# Patient Record
Sex: MALE | Race: WHITE | NOT HISPANIC OR LATINO | Employment: OTHER | URBAN - METROPOLITAN AREA
[De-identification: names, ages, dates, MRNs, and addresses within clinical notes are randomized per-mention and may not be internally consistent; named-entity substitution may affect disease eponyms.]

---

## 2018-02-05 ENCOUNTER — OFFICE VISIT CONVERTED (OUTPATIENT)
Dept: FAMILY MEDICINE CLINIC | Facility: CLINIC | Age: 58
End: 2018-02-05
Attending: PHYSICIAN ASSISTANT

## 2018-08-31 ENCOUNTER — OFFICE VISIT CONVERTED (OUTPATIENT)
Dept: FAMILY MEDICINE CLINIC | Facility: CLINIC | Age: 58
End: 2018-08-31
Attending: PHYSICIAN ASSISTANT

## 2018-08-31 ENCOUNTER — CONVERSION ENCOUNTER (OUTPATIENT)
Dept: FAMILY MEDICINE CLINIC | Facility: CLINIC | Age: 58
End: 2018-08-31

## 2019-01-08 ENCOUNTER — HOSPITAL ENCOUNTER (OUTPATIENT)
Dept: OTHER | Facility: HOSPITAL | Age: 59
Discharge: HOME | End: 2019-01-08
Attending: NURSE PRACTITIONER

## 2019-01-08 LAB
HBA1C MFR BLD: 16.2 G/DL (ref 14–18)
HCT VFR BLD AUTO: 47.8 % (ref 42–52)

## 2019-01-29 ENCOUNTER — HOSPITAL ENCOUNTER (OUTPATIENT)
Dept: GENERAL RADIOLOGY | Facility: HOSPITAL | Age: 59
Discharge: HOME | End: 2019-01-29
Attending: INTERNAL MEDICINE

## 2019-02-12 ENCOUNTER — HOSPITAL ENCOUNTER (OUTPATIENT)
Dept: ONCOLOGY | Facility: HOSPITAL | Age: 59
Discharge: HOME | End: 2019-02-12
Attending: NURSE PRACTITIONER

## 2019-02-12 LAB
BASOPHILS # BLD AUTO: 0.04 10*3/UL (ref 0–0.2)
BASOPHILS # BLD: 1 % (ref 0–3)
BASOPHILS NFR BLD AUTO: 0.89 % (ref 0–3)
CONV ABS BANDS: 0 % (ref 1–5)
CONV SEGMENTED NEUTROPHILS: 62 % (ref 45–70)
EOSINOPHIL # BLD AUTO: 0.49 10*3/UL (ref 0–0.7)
EOSINOPHIL # BLD AUTO: 10.2 % (ref 0–7)
EOSINOPHIL NFR BLD AUTO: 5 % (ref 0–7)
ERYTHROCYTE [DISTWIDTH] IN BLOOD BY AUTOMATED COUNT: 11.7 % (ref 11.5–14.5)
HBA1C MFR BLD: 16.9 G/DL (ref 14–18)
HCT VFR BLD AUTO: 47.4 % (ref 42–52)
LYMPHOCYTES # BLD AUTO: 1.06 10*3/UL (ref 1–5)
MCH RBC QN AUTO: 32.1 PG (ref 27–31)
MCHC RBC AUTO-ENTMCNC: 35.7 G/DL (ref 33–37)
MCV RBC AUTO: 90 FL (ref 80–96)
MONOCYTES # BLD AUTO: 0.59 10*3/UL (ref 0.2–1.2)
MONOCYTES NFR BLD AUTO: 12.4 % (ref 3–10)
MONOCYTES NFR BLD MANUAL: 11 % (ref 3–10)
NEUTROPHILS # BLD AUTO: 2.58 10*3/UL (ref 2–8)
NEUTROPHILS NFR BLD AUTO: 54.2 % (ref 30–85)
NRBC BLD AUTO-RTO: 0 % (ref 0–0.01)
NUC CELL # PRT MANUAL: 0 /100{WBCS}
PLAT MORPH BLD: NORMAL
PLATELET # BLD AUTO: 226 10*3/UL (ref 130–400)
PMV BLD AUTO: 7.6 FL (ref 7.4–10.4)
RBC # BLD AUTO: 5.27 10*6/UL (ref 4.7–6.1)
SMALL PLATELETS BLD QL SMEAR: ADEQUATE
VARIANT LYMPHS NFR BLD MANUAL: 21 % (ref 20–45)
VARIANT LYMPHS NFR BLD MANUAL: 22.3 % (ref 20–45)
WBC # BLD AUTO: 4.76 10*3/UL (ref 4.8–10.8)

## 2019-02-19 ENCOUNTER — HOSPITAL ENCOUNTER (OUTPATIENT)
Dept: OTHER | Facility: HOSPITAL | Age: 59
Discharge: HOME | End: 2019-02-19
Attending: NURSE PRACTITIONER

## 2019-02-19 LAB
HBA1C MFR BLD: 17.1 G/DL (ref 14–18)
HCT VFR BLD AUTO: 49.2 % (ref 42–52)

## 2019-03-01 ENCOUNTER — OFFICE VISIT CONVERTED (OUTPATIENT)
Dept: FAMILY MEDICINE CLINIC | Facility: CLINIC | Age: 59
End: 2019-03-01
Attending: PHYSICIAN ASSISTANT

## 2019-03-19 ENCOUNTER — HOSPITAL ENCOUNTER (OUTPATIENT)
Dept: OTHER | Facility: HOSPITAL | Age: 59
Discharge: HOME | End: 2019-03-19
Attending: NURSE PRACTITIONER

## 2019-03-19 LAB
ALBUMIN SERPL-MCNC: 4.2 G/DL (ref 3.5–5)
ALBUMIN/GLOB SERPL: 1.4 {RATIO} (ref 1.4–2.6)
ALP SERPL-CCNC: 80 U/L (ref 56–119)
ALT SERPL-CCNC: 19 U/L (ref 10–40)
ANION GAP SERPL CALC-SCNC: 16 MMOL/L (ref 8–19)
APPEARANCE UR: CLEAR
AST SERPL-CCNC: 16 U/L (ref 15–50)
BASOPHILS # BLD AUTO: 0.09 10*3/UL (ref 0–0.2)
BASOPHILS NFR BLD AUTO: 1.4 % (ref 0–3)
BILIRUB SERPL-MCNC: 0.4 MG/DL (ref 0.2–1.3)
BILIRUB UR QL: NEGATIVE
BUN SERPL-MCNC: 8 MG/DL (ref 5–25)
BUN/CREAT SERPL: 9 {RATIO} (ref 6–20)
CALCIUM SERPL-MCNC: 8.9 MG/DL (ref 8.7–10.4)
CHLORIDE SERPL-SCNC: 101 MMOL/L (ref 99–111)
CHOLEST SERPL-MCNC: 165 MG/DL (ref 107–200)
CHOLEST/HDLC SERPL: 4 {RATIO} (ref 3–6)
COLOR UR: YELLOW
CONV ABS IMM GRAN: 0.02 10*3/UL (ref 0–0.2)
CONV CO2: 27 MMOL/L (ref 22–32)
CONV COLLECTION SOURCE (UA): NORMAL
CONV IMMATURE GRAN: 0.3 % (ref 0–1.8)
CONV TOTAL PROTEIN: 7.1 G/DL (ref 6.3–8.2)
CONV UROBILINOGEN IN URINE BY AUTOMATED TEST STRIP: 1 {EHRLICHU}/DL (ref 0.1–1)
CREAT UR-MCNC: 0.94 MG/DL (ref 0.7–1.2)
DEPRECATED RDW RBC AUTO: 41.9 FL (ref 35.1–43.9)
EOSINOPHIL # BLD AUTO: 0.42 10*3/UL (ref 0–0.7)
EOSINOPHIL # BLD AUTO: 6.7 % (ref 0–7)
ERYTHROCYTE [DISTWIDTH] IN BLOOD BY AUTOMATED COUNT: 12.6 % (ref 11.6–14.4)
EST. AVERAGE GLUCOSE BLD GHB EST-MCNC: 128 MG/DL
GFR SERPLBLD BASED ON 1.73 SQ M-ARVRAT: >60 ML/MIN/{1.73_M2}
GLOBULIN UR ELPH-MCNC: 2.9 G/DL (ref 2–3.5)
GLUCOSE SERPL-MCNC: 112 MG/DL (ref 70–99)
GLUCOSE UR QL: NEGATIVE MG/DL
HBA1C MFR BLD: 15.7 G/DL (ref 14–18)
HBA1C MFR BLD: 16.1 G/DL (ref 14–18)
HBA1C MFR BLD: 6.1 % (ref 3.5–5.7)
HCT VFR BLD AUTO: 46.3 % (ref 42–52)
HCT VFR BLD AUTO: 46.7 % (ref 42–52)
HDLC SERPL-MCNC: 41 MG/DL (ref 40–60)
HGB UR QL STRIP: NEGATIVE
KETONES UR QL STRIP: NEGATIVE MG/DL
LDLC SERPL CALC-MCNC: 82 MG/DL (ref 70–100)
LEUKOCYTE ESTERASE UR QL STRIP: NEGATIVE
LYMPHOCYTES # BLD AUTO: 1.33 10*3/UL (ref 1–5)
MCH RBC QN AUTO: 31.1 PG (ref 27–31)
MCHC RBC AUTO-ENTMCNC: 34.5 G/DL (ref 33–37)
MCV RBC AUTO: 90.2 FL (ref 80–96)
MONOCYTES # BLD AUTO: 0.69 10*3/UL (ref 0.2–1.2)
MONOCYTES NFR BLD AUTO: 11 % (ref 3–10)
NEUTROPHILS # BLD AUTO: 3.75 10*3/UL (ref 2–8)
NEUTROPHILS NFR BLD AUTO: 59.5 % (ref 30–85)
NITRITE UR QL STRIP: NEGATIVE
NRBC CBCN: 0 % (ref 0–0.7)
OSMOLALITY SERPL CALC.SUM OF ELEC: 289 MOSM/KG (ref 273–304)
PH UR STRIP.AUTO: 8 [PH] (ref 5–8)
PLATELET # BLD AUTO: 254 10*3/UL (ref 130–400)
PMV BLD AUTO: 10.1 FL (ref 9.4–12.4)
POTASSIUM SERPL-SCNC: 4 MMOL/L (ref 3.5–5.3)
PROT UR QL: NEGATIVE MG/DL
RBC # BLD AUTO: 5.18 10*6/UL (ref 4.7–6.1)
SODIUM SERPL-SCNC: 140 MMOL/L (ref 135–147)
SP GR UR: 1.02 (ref 1–1.03)
T4 FREE SERPL-MCNC: 1.2 NG/DL (ref 0.9–1.8)
TRIGL SERPL-MCNC: 208 MG/DL (ref 40–150)
TSH SERPL-ACNC: 2.34 M[IU]/L (ref 0.27–4.2)
VARIANT LYMPHS NFR BLD MANUAL: 21.1 % (ref 20–45)
VLDLC SERPL-MCNC: 42 MG/DL (ref 5–37)
WBC # BLD AUTO: 6.3 10*3/UL (ref 4.8–10.8)

## 2019-03-20 LAB — HCV AB S/CO SERPL IA: <0.1 S/CO RATIO (ref 0–0.9)

## 2019-04-23 ENCOUNTER — HOSPITAL ENCOUNTER (OUTPATIENT)
Dept: OTHER | Facility: HOSPITAL | Age: 59
Discharge: HOME | End: 2019-04-23
Attending: NURSE PRACTITIONER

## 2019-04-23 LAB
CONV THERAPEUTIC PHLEBOTOMY: 510 ML
HBA1C MFR BLD: 16.9 G/DL (ref 14–18)
HCT VFR BLD AUTO: 51.1 % (ref 42–52)

## 2019-05-07 ENCOUNTER — HOSPITAL ENCOUNTER (OUTPATIENT)
Dept: ONCOLOGY | Facility: HOSPITAL | Age: 59
Discharge: HOME | End: 2019-05-07
Attending: NURSE PRACTITIONER

## 2019-07-17 ENCOUNTER — HOSPITAL ENCOUNTER (OUTPATIENT)
Dept: INFUSION THERAPY | Facility: HOSPITAL | Age: 59
Discharge: HOME | End: 2019-07-17
Attending: NURSE PRACTITIONER

## 2019-07-17 LAB
ALBUMIN SERPL-MCNC: 4.6 G/DL (ref 3.5–5)
ALBUMIN/GLOB SERPL: 1.6 {RATIO} (ref 1.4–2.6)
ALP SERPL-CCNC: 71 U/L (ref 56–155)
ALT SERPL-CCNC: 26 U/L (ref 10–40)
ANION GAP SERPL CALC-SCNC: 19 MMOL/L (ref 8–19)
AST SERPL-CCNC: 22 U/L (ref 15–50)
BASOPHILS # BLD AUTO: 0.07 10*3/UL (ref 0–0.2)
BASOPHILS NFR BLD AUTO: 1.6 % (ref 0–3)
BILIRUB SERPL-MCNC: 0.45 MG/DL (ref 0.2–1.3)
BUN SERPL-MCNC: 10 MG/DL (ref 5–25)
BUN/CREAT SERPL: 11 {RATIO} (ref 6–20)
CALCIUM SERPL-MCNC: 9.1 MG/DL (ref 8.7–10.4)
CHLORIDE SERPL-SCNC: 103 MMOL/L (ref 99–111)
CONV ABS IMM GRAN: 0.01 10*3/UL (ref 0–0.2)
CONV CO2: 22 MMOL/L (ref 22–32)
CONV IMMATURE GRAN: 0.2 % (ref 0–1.8)
CONV TOTAL PROTEIN: 7.4 G/DL (ref 6.3–8.2)
CREAT UR-MCNC: 0.9 MG/DL (ref 0.7–1.2)
DEPRECATED RDW RBC AUTO: 47.5 FL (ref 35.1–43.9)
EOSINOPHIL # BLD AUTO: 0.33 10*3/UL (ref 0–0.7)
EOSINOPHIL # BLD AUTO: 7.6 % (ref 0–7)
ERYTHROCYTE [DISTWIDTH] IN BLOOD BY AUTOMATED COUNT: 14.1 % (ref 11.6–14.4)
GFR SERPLBLD BASED ON 1.73 SQ M-ARVRAT: >60 ML/MIN/{1.73_M2}
GLOBULIN UR ELPH-MCNC: 2.8 G/DL (ref 2–3.5)
GLUCOSE SERPL-MCNC: 116 MG/DL (ref 70–99)
HBA1C MFR BLD: 18.3 G/DL (ref 14–18)
HCT VFR BLD AUTO: 53.8 % (ref 42–52)
LDH SERPL-CCNC: 197 U/L (ref 120–240)
LYMPHOCYTES # BLD AUTO: 1 10*3/UL (ref 1–5)
MCH RBC QN AUTO: 31.3 PG (ref 27–31)
MCHC RBC AUTO-ENTMCNC: 34 G/DL (ref 33–37)
MCV RBC AUTO: 92.1 FL (ref 80–96)
MONOCYTES # BLD AUTO: 0.55 10*3/UL (ref 0.2–1.2)
MONOCYTES NFR BLD AUTO: 12.7 % (ref 3–10)
NEUTROPHILS # BLD AUTO: 2.36 10*3/UL (ref 2–8)
NEUTROPHILS NFR BLD AUTO: 54.8 % (ref 30–85)
NRBC CBCN: 0 % (ref 0–0.7)
OSMOLALITY SERPL CALC.SUM OF ELEC: 290 MOSM/KG (ref 273–304)
PLATELET # BLD AUTO: 212 10*3/UL (ref 130–400)
PMV BLD AUTO: 11 FL (ref 9.4–12.4)
POTASSIUM SERPL-SCNC: 4.1 MMOL/L (ref 3.5–5.3)
RBC # BLD AUTO: 5.84 10*6/UL (ref 4.7–6.1)
SODIUM SERPL-SCNC: 140 MMOL/L (ref 135–147)
VARIANT LYMPHS NFR BLD MANUAL: 23.1 % (ref 20–45)
WBC # BLD AUTO: 4.32 10*3/UL (ref 4.8–10.8)

## 2019-09-03 ENCOUNTER — OFFICE VISIT CONVERTED (OUTPATIENT)
Dept: FAMILY MEDICINE CLINIC | Facility: CLINIC | Age: 59
End: 2019-09-03
Attending: PHYSICIAN ASSISTANT

## 2019-09-24 ENCOUNTER — HOSPITAL ENCOUNTER (OUTPATIENT)
Dept: INFUSION THERAPY | Facility: HOSPITAL | Age: 59
Discharge: HOME | End: 2019-09-24
Attending: PHYSICIAN ASSISTANT

## 2019-09-24 ENCOUNTER — HOSPITAL ENCOUNTER (OUTPATIENT)
Dept: INFUSION THERAPY | Facility: HOSPITAL | Age: 59
Discharge: HOME | End: 2019-09-24
Attending: NURSE PRACTITIONER

## 2019-09-24 LAB
CHOLEST SERPL-MCNC: 181 MG/DL (ref 107–200)
CHOLEST/HDLC SERPL: 5 {RATIO} (ref 3–6)
HCT VFR BLD AUTO: 51.6 % (ref 42–52)
HDLC SERPL-MCNC: 36 MG/DL (ref 40–60)
HGB BLD-MCNC: 17.7 G/DL (ref 14–18)
LDLC SERPL CALC-MCNC: 105 MG/DL (ref 70–100)
PSA SERPL-MCNC: 0.76 NG/ML (ref 0–4)
T4 FREE SERPL-MCNC: 1.3 NG/DL (ref 0.9–1.8)
TRIGL SERPL-MCNC: 201 MG/DL (ref 40–150)
TSH SERPL-ACNC: 1.83 M[IU]/L (ref 0.27–4.2)
VLDLC SERPL-MCNC: 40 MG/DL (ref 5–37)

## 2019-11-05 ENCOUNTER — HOSPITAL ENCOUNTER (OUTPATIENT)
Dept: ONCOLOGY | Facility: HOSPITAL | Age: 59
Discharge: HOME | End: 2019-11-05

## 2019-11-18 ENCOUNTER — HOSPITAL ENCOUNTER (OUTPATIENT)
Dept: INFUSION THERAPY | Facility: HOSPITAL | Age: 59
Discharge: HOME | End: 2019-11-18
Attending: NURSE PRACTITIONER

## 2019-11-18 LAB
HCT VFR BLD AUTO: 49.7 % (ref 42–52)
HGB BLD-MCNC: 16.6 G/DL (ref 14–18)

## 2019-12-18 ENCOUNTER — HOSPITAL ENCOUNTER (OUTPATIENT)
Dept: INFUSION THERAPY | Facility: HOSPITAL | Age: 59
Discharge: HOME | End: 2019-12-18
Attending: NURSE PRACTITIONER

## 2019-12-18 LAB
HCT VFR BLD AUTO: 46.2 % (ref 42–52)
HGB BLD-MCNC: 15.9 G/DL (ref 14–18)

## 2020-01-15 ENCOUNTER — OFFICE VISIT CONVERTED (OUTPATIENT)
Dept: ONCOLOGY | Facility: HOSPITAL | Age: 60
End: 2020-01-15
Attending: INTERNAL MEDICINE

## 2020-01-15 ENCOUNTER — HOSPITAL ENCOUNTER (OUTPATIENT)
Dept: ONCOLOGY | Facility: HOSPITAL | Age: 60
Discharge: HOME | End: 2020-01-15
Attending: INTERNAL MEDICINE

## 2020-01-15 LAB
BASOPHILS # BLD AUTO: 0.07 10*3/UL (ref 0–0.2)
BASOPHILS NFR BLD AUTO: 1.4 % (ref 0–3)
CONV ABS IMM GRAN: 0.02 10*3/UL (ref 0–0.54)
CONV EOSINOPHILS PERCENT BY MANUAL COUNT: 7.6 % (ref 0–7)
CONV IMMATURE GRAN: 0.4 % (ref 0–0.4)
EOSINOPHIL # BLD MANUAL: 0.38 10*3/UL (ref 0–0.7)
ERYTHROCYTE [DISTWIDTH] IN BLOOD BY AUTOMATED COUNT: 13.2 % (ref 11.5–14.5)
ERYTHROCYTE [DISTWIDTH] IN BLOOD BY AUTOMATED COUNT: 44.1 FL
HBA1C MFR BLD: 16.5 G/DL (ref 14–18)
HCT VFR BLD AUTO: 47.9 % (ref 42–52)
LYMPHOCYTES # BLD AUTO: 1.08 10*3/UL (ref 1–5)
LYMPHOCYTES NFR BLD AUTO: 21.7 % (ref 20–45)
MCH RBC QN AUTO: 31 PG (ref 27–31)
MCHC RBC AUTO-ENTMCNC: 34.4 G/DL (ref 33–37)
MCV RBC AUTO: 90 FL (ref 80–96)
MONOCYTES # BLD AUTO: 0.65 10*3/UL (ref 0.2–1.2)
MONOCYTES NFR BLD MANUAL: 13.1 % (ref 3–10)
NEUTROPHILS # BLD AUTO: 2.78 10*3/UL (ref 2–8)
NEUTROPHILS NFR BLD MANUAL: 55.8 % (ref 30–85)
PLATELET # BLD AUTO: 220 10*3/UL (ref 130–400)
PMV BLD AUTO: 10.3 FL (ref 7.4–10.4)
RBC MORPH BLD: 5.32 10*6/UL (ref 4.7–6.1)
WBC # BLD AUTO: 4.98 10*3/UL (ref 4.8–10.8)

## 2020-01-29 ENCOUNTER — HOSPITAL ENCOUNTER (OUTPATIENT)
Dept: CT IMAGING | Facility: HOSPITAL | Age: 60
Discharge: HOME | End: 2020-01-29
Attending: INTERNAL MEDICINE

## 2020-02-05 ENCOUNTER — OFFICE VISIT CONVERTED (OUTPATIENT)
Dept: ONCOLOGY | Facility: HOSPITAL | Age: 60
End: 2020-02-05
Attending: INTERNAL MEDICINE

## 2020-02-10 ENCOUNTER — HOSPITAL ENCOUNTER (OUTPATIENT)
Dept: INFUSION THERAPY | Facility: HOSPITAL | Age: 60
Discharge: HOME | End: 2020-02-10
Attending: INTERNAL MEDICINE

## 2020-02-10 LAB
HCT VFR BLD AUTO: 48.6 % (ref 42–52)
HGB BLD-MCNC: 16.8 G/DL (ref 14–18)

## 2020-03-03 ENCOUNTER — CONVERSION ENCOUNTER (OUTPATIENT)
Dept: FAMILY MEDICINE CLINIC | Facility: CLINIC | Age: 60
End: 2020-03-03

## 2020-03-03 ENCOUNTER — HOSPITAL ENCOUNTER (OUTPATIENT)
Dept: LAB | Facility: HOSPITAL | Age: 60
Discharge: HOME | End: 2020-03-03
Attending: PHYSICIAN ASSISTANT

## 2020-03-03 ENCOUNTER — OFFICE VISIT CONVERTED (OUTPATIENT)
Dept: FAMILY MEDICINE CLINIC | Facility: CLINIC | Age: 60
End: 2020-03-03
Attending: PHYSICIAN ASSISTANT

## 2020-03-03 LAB
25(OH)D3 SERPL-MCNC: 14.5 NG/ML (ref 30–100)
APPEARANCE UR: CLEAR
BASOPHILS # BLD AUTO: 0.09 10*3/UL (ref 0–0.2)
BASOPHILS NFR BLD AUTO: 1.7 % (ref 0–3)
BILIRUB UR QL: NEGATIVE
COLOR UR: YELLOW
CONV ABS IMM GRAN: 0.01 10*3/UL (ref 0–0.2)
CONV COLLECTION SOURCE (UA): NORMAL
CONV IMMATURE GRAN: 0.2 % (ref 0–1.8)
CONV UROBILINOGEN IN URINE BY AUTOMATED TEST STRIP: 1 {EHRLICHU}/DL (ref 0.1–1)
DEPRECATED RDW RBC AUTO: 46.1 FL (ref 35.1–43.9)
EOSINOPHIL # BLD AUTO: 0.42 10*3/UL (ref 0–0.7)
EOSINOPHIL # BLD AUTO: 8 % (ref 0–7)
ERYTHROCYTE [DISTWIDTH] IN BLOOD BY AUTOMATED COUNT: 13.6 % (ref 11.6–14.4)
GLUCOSE UR QL: NEGATIVE MG/DL
HCT VFR BLD AUTO: 50.7 % (ref 42–52)
HGB BLD-MCNC: 17.2 G/DL (ref 14–18)
HGB UR QL STRIP: NEGATIVE
KETONES UR QL STRIP: NEGATIVE MG/DL
LEUKOCYTE ESTERASE UR QL STRIP: NEGATIVE
LYMPHOCYTES # BLD AUTO: 1.27 10*3/UL (ref 1–5)
LYMPHOCYTES NFR BLD AUTO: 24.2 % (ref 20–45)
MCH RBC QN AUTO: 30.9 PG (ref 27–31)
MCHC RBC AUTO-ENTMCNC: 33.9 G/DL (ref 33–37)
MCV RBC AUTO: 91.2 FL (ref 80–96)
MONOCYTES # BLD AUTO: 0.66 10*3/UL (ref 0.2–1.2)
MONOCYTES NFR BLD AUTO: 12.6 % (ref 3–10)
NEUTROPHILS # BLD AUTO: 2.8 10*3/UL (ref 2–8)
NEUTROPHILS NFR BLD AUTO: 53.3 % (ref 30–85)
NITRITE UR QL STRIP: NEGATIVE
NRBC CBCN: 0 % (ref 0–0.7)
PH UR STRIP.AUTO: 8 [PH] (ref 5–8)
PLATELET # BLD AUTO: 248 10*3/UL (ref 130–400)
PMV BLD AUTO: 11.2 FL (ref 9.4–12.4)
PROT UR QL: NEGATIVE MG/DL
RBC # BLD AUTO: 5.56 10*6/UL (ref 4.7–6.1)
SP GR UR: 1.02 (ref 1–1.03)
T4 FREE SERPL-MCNC: 1 NG/DL (ref 0.9–1.8)
TSH SERPL-ACNC: 2.81 M[IU]/L (ref 0.27–4.2)
WBC # BLD AUTO: 5.25 10*3/UL (ref 4.8–10.8)

## 2020-05-11 ENCOUNTER — HOSPITAL ENCOUNTER (OUTPATIENT)
Dept: INFUSION THERAPY | Facility: HOSPITAL | Age: 60
Discharge: HOME | End: 2020-05-11
Attending: INTERNAL MEDICINE

## 2020-05-11 LAB
BASOPHILS # BLD AUTO: 0.09 10*3/UL (ref 0–0.2)
BASOPHILS NFR BLD AUTO: 1.8 % (ref 0–3)
CONV ABS IMM GRAN: 0.01 10*3/UL (ref 0–0.2)
CONV IMMATURE GRAN: 0.2 % (ref 0–1.8)
DEPRECATED RDW RBC AUTO: 45.8 FL (ref 35.1–43.9)
EOSINOPHIL # BLD AUTO: 0.39 10*3/UL (ref 0–0.7)
EOSINOPHIL # BLD AUTO: 7.7 % (ref 0–7)
ERYTHROCYTE [DISTWIDTH] IN BLOOD BY AUTOMATED COUNT: 13.5 % (ref 11.6–14.4)
HCT VFR BLD AUTO: 53.6 % (ref 42–52)
HGB BLD-MCNC: 18.1 G/DL (ref 14–18)
LYMPHOCYTES # BLD AUTO: 1.05 10*3/UL (ref 1–5)
LYMPHOCYTES NFR BLD AUTO: 20.8 % (ref 20–45)
MCH RBC QN AUTO: 30.9 PG (ref 27–31)
MCHC RBC AUTO-ENTMCNC: 33.8 G/DL (ref 33–37)
MCV RBC AUTO: 91.5 FL (ref 80–96)
MONOCYTES # BLD AUTO: 0.39 10*3/UL (ref 0.2–1.2)
MONOCYTES NFR BLD AUTO: 7.7 % (ref 3–10)
NEUTROPHILS # BLD AUTO: 3.11 10*3/UL (ref 2–8)
NEUTROPHILS NFR BLD AUTO: 61.8 % (ref 30–85)
NRBC CBCN: 0 % (ref 0–0.7)
PLATELET # BLD AUTO: 202 10*3/UL (ref 130–400)
PMV BLD AUTO: 11.2 FL (ref 9.4–12.4)
RBC # BLD AUTO: 5.86 10*6/UL (ref 4.7–6.1)
WBC # BLD AUTO: 5.04 10*3/UL (ref 4.8–10.8)

## 2020-08-10 ENCOUNTER — HOSPITAL ENCOUNTER (OUTPATIENT)
Dept: ONCOLOGY | Facility: HOSPITAL | Age: 60
Discharge: HOME | End: 2020-08-10
Attending: NURSE PRACTITIONER

## 2020-08-10 ENCOUNTER — OFFICE VISIT CONVERTED (OUTPATIENT)
Dept: ONCOLOGY | Facility: HOSPITAL | Age: 60
End: 2020-08-10
Attending: NURSE PRACTITIONER

## 2020-08-31 ENCOUNTER — HOSPITAL ENCOUNTER (OUTPATIENT)
Dept: INFUSION THERAPY | Facility: HOSPITAL | Age: 60
Discharge: HOME | End: 2020-08-31
Attending: NURSE PRACTITIONER

## 2020-08-31 LAB
HCT VFR BLD AUTO: 50.8 % (ref 42–52)
HGB BLD-MCNC: 17.5 G/DL (ref 14–18)

## 2020-10-22 ENCOUNTER — HOSPITAL ENCOUNTER (OUTPATIENT)
Dept: LAB | Facility: HOSPITAL | Age: 60
Discharge: HOME | End: 2020-10-22
Attending: PHYSICIAN ASSISTANT

## 2020-10-22 ENCOUNTER — HOSPITAL ENCOUNTER (OUTPATIENT)
Dept: GENERAL RADIOLOGY | Facility: HOSPITAL | Age: 60
Discharge: HOME | End: 2020-10-22
Attending: PHYSICIAN ASSISTANT

## 2020-10-22 ENCOUNTER — CONVERSION ENCOUNTER (OUTPATIENT)
Dept: FAMILY MEDICINE CLINIC | Facility: CLINIC | Age: 60
End: 2020-10-22

## 2020-10-22 LAB
25(OH)D3 SERPL-MCNC: 17.7 NG/ML (ref 30–100)
ALBUMIN SERPL-MCNC: 4.3 G/DL (ref 3.5–5)
ALBUMIN/GLOB SERPL: 1.7 {RATIO} (ref 1.4–2.6)
ALP SERPL-CCNC: 81 U/L (ref 56–155)
ALT SERPL-CCNC: 18 U/L (ref 10–40)
ANION GAP SERPL CALC-SCNC: 17 MMOL/L (ref 8–19)
APPEARANCE UR: CLEAR
AST SERPL-CCNC: 16 U/L (ref 15–50)
BASOPHILS # BLD AUTO: 0.1 10*3/UL (ref 0–0.2)
BASOPHILS NFR BLD AUTO: 1.8 % (ref 0–3)
BILIRUB SERPL-MCNC: 0.26 MG/DL (ref 0.2–1.3)
BILIRUB UR QL: NEGATIVE
BUN SERPL-MCNC: 9 MG/DL (ref 5–25)
BUN/CREAT SERPL: 9 {RATIO} (ref 6–20)
CALCIUM SERPL-MCNC: 9 MG/DL (ref 8.7–10.4)
CHLORIDE SERPL-SCNC: 104 MMOL/L (ref 99–111)
CHOLEST SERPL-MCNC: 155 MG/DL (ref 107–200)
CHOLEST/HDLC SERPL: 3.9 {RATIO} (ref 3–6)
COLOR UR: YELLOW
CONV ABS IMM GRAN: 0.02 10*3/UL (ref 0–0.2)
CONV CO2: 24 MMOL/L (ref 22–32)
CONV COLLECTION SOURCE (UA): NORMAL
CONV IMMATURE GRAN: 0.4 % (ref 0–1.8)
CONV TOTAL PROTEIN: 6.8 G/DL (ref 6.3–8.2)
CONV UROBILINOGEN IN URINE BY AUTOMATED TEST STRIP: 1 {EHRLICHU}/DL (ref 0.1–1)
CREAT UR-MCNC: 0.98 MG/DL (ref 0.7–1.2)
DEPRECATED RDW RBC AUTO: 47.8 FL (ref 35.1–43.9)
EOSINOPHIL # BLD AUTO: 0.43 10*3/UL (ref 0–0.7)
EOSINOPHIL # BLD AUTO: 7.8 % (ref 0–7)
ERYTHROCYTE [DISTWIDTH] IN BLOOD BY AUTOMATED COUNT: 14.4 % (ref 11.6–14.4)
GFR SERPLBLD BASED ON 1.73 SQ M-ARVRAT: >60 ML/MIN/{1.73_M2}
GLOBULIN UR ELPH-MCNC: 2.5 G/DL (ref 2–3.5)
GLUCOSE SERPL-MCNC: 113 MG/DL (ref 70–99)
GLUCOSE UR QL: NEGATIVE MG/DL
HCT VFR BLD AUTO: 51.3 % (ref 42–52)
HDLC SERPL-MCNC: 40 MG/DL (ref 40–60)
HGB BLD-MCNC: 16.9 G/DL (ref 14–18)
HGB UR QL STRIP: NEGATIVE
KETONES UR QL STRIP: NEGATIVE MG/DL
LDLC SERPL CALC-MCNC: 83 MG/DL (ref 70–100)
LEUKOCYTE ESTERASE UR QL STRIP: NEGATIVE
LYMPHOCYTES # BLD AUTO: 1.19 10*3/UL (ref 1–5)
LYMPHOCYTES NFR BLD AUTO: 21.6 % (ref 20–45)
MCH RBC QN AUTO: 29.7 PG (ref 27–31)
MCHC RBC AUTO-ENTMCNC: 32.9 G/DL (ref 33–37)
MCV RBC AUTO: 90.2 FL (ref 80–96)
MONOCYTES # BLD AUTO: 0.69 10*3/UL (ref 0.2–1.2)
MONOCYTES NFR BLD AUTO: 12.5 % (ref 3–10)
NEUTROPHILS # BLD AUTO: 3.09 10*3/UL (ref 2–8)
NEUTROPHILS NFR BLD AUTO: 55.9 % (ref 30–85)
NITRITE UR QL STRIP: NEGATIVE
NRBC CBCN: 0 % (ref 0–0.7)
OSMOLALITY SERPL CALC.SUM OF ELEC: 291 MOSM/KG (ref 273–304)
PH UR STRIP.AUTO: 6.5 [PH] (ref 5–8)
PLATELET # BLD AUTO: 221 10*3/UL (ref 130–400)
PMV BLD AUTO: 11.1 FL (ref 9.4–12.4)
POTASSIUM SERPL-SCNC: 4 MMOL/L (ref 3.5–5.3)
PROT UR QL: NEGATIVE MG/DL
PSA SERPL-MCNC: 0.89 NG/ML (ref 0–4)
RBC # BLD AUTO: 5.69 10*6/UL (ref 4.7–6.1)
SODIUM SERPL-SCNC: 141 MMOL/L (ref 135–147)
SP GR UR: 1.01 (ref 1–1.03)
T4 FREE SERPL-MCNC: 1 NG/DL (ref 0.9–1.8)
TRIGL SERPL-MCNC: 162 MG/DL (ref 40–150)
TSH SERPL-ACNC: 4.1 M[IU]/L (ref 0.27–4.2)
VLDLC SERPL-MCNC: 32 MG/DL (ref 5–37)
WBC # BLD AUTO: 5.52 10*3/UL (ref 4.8–10.8)

## 2020-10-23 ENCOUNTER — HOSPITAL ENCOUNTER (OUTPATIENT)
Dept: GENERAL RADIOLOGY | Facility: HOSPITAL | Age: 60
Discharge: HOME | End: 2020-10-23
Attending: PHYSICIAN ASSISTANT

## 2020-10-24 LAB
EST. AVERAGE GLUCOSE BLD GHB EST-MCNC: 128 MG/DL
HBA1C MFR BLD: 6.1 % (ref 3.5–5.7)

## 2020-11-02 ENCOUNTER — HOSPITAL ENCOUNTER (OUTPATIENT)
Dept: INFUSION THERAPY | Facility: HOSPITAL | Age: 60
Discharge: HOME | End: 2020-11-02
Attending: NURSE PRACTITIONER

## 2020-11-02 ENCOUNTER — OFFICE VISIT CONVERTED (OUTPATIENT)
Dept: PULMONOLOGY | Facility: CLINIC | Age: 60
End: 2020-11-02
Attending: NURSE PRACTITIONER

## 2020-11-02 LAB
HCT VFR BLD AUTO: 49.7 % (ref 42–52)
HGB BLD-MCNC: 16.6 G/DL (ref 14–18)

## 2020-12-01 ENCOUNTER — OFFICE VISIT CONVERTED (OUTPATIENT)
Dept: CARDIOLOGY | Facility: CLINIC | Age: 60
End: 2020-12-01
Attending: INTERNAL MEDICINE

## 2020-12-10 ENCOUNTER — HOSPITAL ENCOUNTER (OUTPATIENT)
Dept: NUCLEAR MEDICINE | Facility: HOSPITAL | Age: 60
Discharge: HOME | End: 2020-12-10
Attending: INTERNAL MEDICINE

## 2020-12-15 ENCOUNTER — CONVERSION ENCOUNTER (OUTPATIENT)
Dept: CARDIOLOGY | Facility: CLINIC | Age: 60
End: 2020-12-15
Attending: INTERNAL MEDICINE

## 2021-01-04 ENCOUNTER — HOSPITAL ENCOUNTER (OUTPATIENT)
Dept: INFUSION THERAPY | Facility: HOSPITAL | Age: 61
Discharge: HOME | End: 2021-01-04
Attending: NURSE PRACTITIONER

## 2021-01-04 LAB
HCT VFR BLD AUTO: 47.1 % (ref 42–52)
HGB BLD-MCNC: 16.2 G/DL (ref 14–18)

## 2021-01-22 ENCOUNTER — HOSPITAL ENCOUNTER (OUTPATIENT)
Dept: GASTROENTEROLOGY | Facility: HOSPITAL | Age: 61
Setting detail: HOSPITAL OUTPATIENT SURGERY
Discharge: HOME | End: 2021-01-22
Attending: INTERNAL MEDICINE

## 2021-02-08 ENCOUNTER — OFFICE VISIT CONVERTED (OUTPATIENT)
Dept: PULMONOLOGY | Facility: CLINIC | Age: 61
End: 2021-02-08
Attending: INTERNAL MEDICINE

## 2021-02-08 ENCOUNTER — HOSPITAL ENCOUNTER (OUTPATIENT)
Dept: ONCOLOGY | Facility: HOSPITAL | Age: 61
Discharge: HOME | End: 2021-02-08
Attending: INTERNAL MEDICINE

## 2021-02-08 LAB
ALBUMIN SERPL-MCNC: 4.3 G/DL (ref 3.5–5)
ALBUMIN/GLOB SERPL: 1.9 {RATIO} (ref 1.4–2.6)
ALP SERPL-CCNC: 66 U/L (ref 56–155)
ALT SERPL-CCNC: 27 U/L (ref 10–40)
ANION GAP SERPL CALC-SCNC: 14 MMOL/L (ref 8–19)
AST SERPL-CCNC: 21 U/L (ref 15–50)
BASOPHILS # BLD AUTO: 0.06 10*3/UL (ref 0–0.2)
BASOPHILS NFR BLD AUTO: 1.4 % (ref 0–3)
BILIRUB SERPL-MCNC: 0.4 MG/DL (ref 0.2–1.3)
BUN SERPL-MCNC: 11 MG/DL (ref 5–25)
BUN/CREAT SERPL: 13 {RATIO} (ref 6–20)
CALCIUM SERPL-MCNC: 8.8 MG/DL (ref 8.7–10.4)
CHLORIDE SERPL-SCNC: 106 MMOL/L (ref 99–111)
CONV ABS IMM GRAN: 0.02 10*3/UL (ref 0–0.2)
CONV CO2: 22 MMOL/L (ref 22–32)
CONV IMMATURE GRAN: 0.5 % (ref 0–1.8)
CONV TOTAL PROTEIN: 6.6 G/DL (ref 6.3–8.2)
CREAT UR-MCNC: 0.83 MG/DL (ref 0.7–1.2)
DEPRECATED RDW RBC AUTO: 44.3 FL (ref 35.1–43.9)
EOSINOPHIL # BLD AUTO: 0.33 10*3/UL (ref 0–0.7)
EOSINOPHIL # BLD AUTO: 7.7 % (ref 0–7)
ERYTHROCYTE [DISTWIDTH] IN BLOOD BY AUTOMATED COUNT: 13.9 % (ref 11.6–14.4)
GFR SERPLBLD BASED ON 1.73 SQ M-ARVRAT: >60 ML/MIN/{1.73_M2}
GLOBULIN UR ELPH-MCNC: 2.3 G/DL (ref 2–3.5)
GLUCOSE SERPL-MCNC: 127 MG/DL (ref 70–99)
HCT VFR BLD AUTO: 47.7 % (ref 42–52)
HGB BLD-MCNC: 16.6 G/DL (ref 14–18)
LYMPHOCYTES # BLD AUTO: 0.98 10*3/UL (ref 1–5)
LYMPHOCYTES NFR BLD AUTO: 22.8 % (ref 20–45)
MCH RBC QN AUTO: 30.2 PG (ref 27–31)
MCHC RBC AUTO-ENTMCNC: 34.8 G/DL (ref 33–37)
MCV RBC AUTO: 86.7 FL (ref 80–96)
MONOCYTES # BLD AUTO: 0.5 10*3/UL (ref 0.2–1.2)
MONOCYTES NFR BLD AUTO: 11.7 % (ref 3–10)
NEUTROPHILS # BLD AUTO: 2.4 10*3/UL (ref 2–8)
NEUTROPHILS NFR BLD AUTO: 55.9 % (ref 30–85)
NRBC CBCN: 0 % (ref 0–0.7)
OSMOLALITY SERPL CALC.SUM OF ELEC: 287 MOSM/KG (ref 273–304)
PLATELET # BLD AUTO: 201 10*3/UL (ref 130–400)
PMV BLD AUTO: 10.9 FL (ref 9.4–12.4)
POTASSIUM SERPL-SCNC: 3.8 MMOL/L (ref 3.5–5.3)
RBC # BLD AUTO: 5.5 10*6/UL (ref 4.7–6.1)
SODIUM SERPL-SCNC: 138 MMOL/L (ref 135–147)
WBC # BLD AUTO: 4.29 10*3/UL (ref 4.8–10.8)

## 2021-04-14 ENCOUNTER — OFFICE VISIT CONVERTED (OUTPATIENT)
Dept: ORTHOPEDIC SURGERY | Facility: CLINIC | Age: 61
End: 2021-04-14
Attending: ORTHOPAEDIC SURGERY

## 2021-05-10 NOTE — PROCEDURES
"   Procedure Note      Patient Name: Reinier Moctezuma   Patient ID: [id number]   Sex: Male   YOB: 1960    Primary Care Provider: Lasha Connell PA-C   Referring Provider: Lasha Connell PA-C    Visit Date: December 15, 2020    Provider: Yeison Ibarra MD   Location: [facility]   Location Address: [address] [place] [address], [address]   [place] Tin[place]  [id number]   Location Phone: [phone]          FINAL REPORT   TRANSTHORACIC ECHOCARDIOGRAM REPORT    Diagnosis: Shortness of breath, coronary artery calcification.   Height: 5'10\" Weight: 198 B/P: 140/84 BSA: 2.1   Tech: BNS   MEASUREMENTS:  RVID (Diastole) : RVID. (NORMAL: 0.7 to 2.4 cm max)   LVID (Systole): 2.8 cm (Diastole): 4.5 cm . (NORMAL: 3.7 - 5.4 cm)   Posterior Wall Thickness (Diastole): 1.3 cm. (NORMAL: 0.8 - 1.1 cm)   Septal Thickness (Diastole): 1.2 cm. (NORMAL: 0.7 - 1.2 cm)   LAID (Systole): 3.0 cm. (NORMAL: 1.9 - 3.8 cm)   Aortic Root Diameter (Diastole): 3.2 cm. (NORMAL: 2.0 - 3.7 cm)   COMMENTS:  The patient underwent 2-D, M-Mode, and Doppler examination, including pulse-wave, continuous-wave, and color-flow analysis; the study is technically adequate.   FINDINGS:  MITRAL VALVE: Normal in appearance, opens well. No evidence of mitral valve prolapse. No mitral stenosis. Trace mitral regurgitation.   AORTIC VALVE: Moderate calcification of mitral valve leaflets noted. There is moderate restriction of leaflet opening. There is turbulence of flow across the aortic valve. The V-max across the aortic valve is 385 cm/sec with a max gradient of 59 mmHg and a mean gradient of 33 mmHg. The calculated aortic valve area by continuity equation is 1.0 cm2. These findings are consistent with moderate to severe aortic valvular stenosis. There is mild aortic insufficiency noted on Doppler examination.   TRICUSPID VALVE: Normal in appearance, opens well. Trace tricuspid regurgitation. Normal pulmonary artery systolic pressure.   PULMONIC VALVE: Grossly normal. "   AORTIC ROOT: Normal in size with adequate motion.   LEFT ATRIUM: Normal in size. No intracavitary masses or clots seen. LA volume index is 11 mL/m2.   LEFT VENTRICLE: The left ventricular chamber size is normal. There is mild concentric left ventricular hypertrophy. Left ventricular systolic function is normal. The calculated LV ejection fraction by 3D echocardiography is 61%. There are no regional wall motion abnormalities. There is grade 1 diastolic dysfunction, impaired relaxation of the left ventricle.   RIGHT VENTRICLE: Normal size and function.   RIGHT ATRIUM: Normal in size.   PERICARDIUM: No evidence of pericardial effusion.   INFERIOR VENA CAVA: Measures 1.3 cm in diameter and there is more than 50% collapse during inspiration.   DOPPLER: E/A ratio is 1.2. DT= 208 msec. IVRT is 81 msec. E/E' is 11.   Faxed: 12/21/2020      CONCLUSION:  1.  Normal left ventricular systolic function with calculated LV ejection fraction of 61%.   2.  Mild concentric left ventricular hypertrophy.   3.  Grade 1 diastolic dysfunction of the left ventricle.   4.  Calcified aortic valve with evidence of moderate to severe aortic stenosis and mild aortic insufficiency.   5.  No evidence of pulmonary artery hypertension.      MD FUNMILAYO Shelton/brandi    This note was transcribed by Shelley Pascual.  brandi/funmilayo  The above service was transcribed by Shelley Pascual, and I attest to the accuracy of the note.  [email or web address]                 Electronically Signed by: Le Pascual-, Other -Author on December 21, 2020 08:32:37 AM  Electronically Co-signed by: Yeison Ibarra MD -Reviewer on December 28, 2020 07:14:35 AM

## 2021-05-10 NOTE — H&P
History and Physical      Patient Name: Reinier Moctezuma   Patient ID: [id number]   Sex: Male   YOB: 1960    Primary Care Provider: Lasha Connell PA-C   Referring Provider: Lasha Connell PA-C    Visit Date: December 1, 2020    Provider: Yeison Ibarra MD   Location: [facility]   Location Address: [address] [place] [address], [address]   [place] Tin[place]  [id number]   Location Phone: [phone]          Chief Complaint     Reason for Consultation: Coronary artery calcification.       History Of Present Illness  Consult requested by: Lasha Connell PA-C   Reinier Moctezuma is a 60 year old /White male with COPD, GERD, hypertension, hypothyroidism, and hyperlipidemia who was referred for a cardiac evaluation because of an incidental finding of coronary artery calcification on CT scan of the chest. The CT scan was done for lung cancer screening because of a history of smoking. The patient denies having any chest pain, tightness, or pressure. He does have shortness of breath, both at rest and on activities. No dizziness. No syncopal episodes. Taking all the medications as prescribed. His previous cardiac workup included a SPECT stress test done in 2011, which showed an apical infarct with ischemia. He was followed by Dr. Bella for several years.   PAST MEDICAL HISTORY: 1) Hypertension; 2) Hypothyroidism; 3) Hyperlipidemia; 4) COPD, followed by Dr. Mcnulty; 5) Negative for coronary artery disease.   PSYCHOSOCIAL HISTORY: Previous smoker who quit years back. Drinks alcohol daily. No recreational drug usage. No mood changes or depression.   FAMILY HISTORY: Reviewed. Positive for premature coronary artery disease. Patient's father had coronary artery bypass grafting in his 50's.   CURRENT MEDICATIONS: Atenolol-chlorthalidone 100-25 mg 1/2 daily; levothyroxine 50 mcg d; d amlodipine besylate 10 mg daily; rosuvastatin 40 mg daily; Anoro Ellipta p.r.n.; lansoprazole 15 mg daily.   ALLERGIES: No known drug allergies.  "      Review of Systems  · Constitutional  o Admits  o : good general health lately, recent weight changes   o Denies  o : fatigue  · Eyes  o Admits  o : blurred vision  · HENT  o Admits  o : hearing loss or ringing  o Denies  o : chronic sinus problem, swollen glands in neck  · Cardiovascular  o Admits  o : shortness of breath while walking or lying flat  o Denies  o : chest pain, palpitations (fast, fluttering, or skipping beats), swelling (feet, ankles, hands)  · Respiratory  o Admits  o : COPD  o Denies  o : chronic or frequent cough, asthma or wheezing  · Gastrointestinal  o Denies  o : ulcers, nausea or vomiting  · Neurologic  o Denies  o : lightheaded or dizzy, stroke, headaches  · Musculoskeletal  o Admits  o : joint pain, back pain  · Endocrine  o Denies  o : thyroid disease, diabetes, heat or cold intolerance, excessive thirst or urination  · Heme-Lymph  o Denies  o : bleeding or bruising tendency, anemia      Vitals  Date Time BP Position Site L\R Cuff Size HR RR TEMP (F) WT  HT  BMI kg/m2 BSA m2 O2 Sat FR L/min FiO2 HC       12/01/2020 09:39 /84 Sitting    60 - R   198lbs 0oz 5'  10\" 28.41 2.11             Physical Examination  · Constitutional  o Appearance  o : Awake, alert, in no acute distress.  · Head and Face  o HEENT  o : No pallor, anicteric. Eyes normal. Moist mucous membranes.  · Neck  o Inspection/Palpation  o : Supple.   o Jugular Veins  o : No JVD. No carotid bruits.  · Respiratory  o Auscultation of Lungs  o : Clear to auscultation bilaterally. No crackles or wheezing.  · Cardiovascular  o Heart  o : S1, S2 normally heard. No S3. No murmur, rubs, or gallops.  · Gastrointestinal  o Abdominal Examination  o : Soft, non-distended. No palpable hepatosplenomegaly. Bowel sounds heard in all four quadrants.  · Musculoskeletal  o General  o : Normal muscle tone and strength.  · Skin and Subcutaneous Tissue  o General Inspection  o : No skin rashes.  · Extremities  o Extremities  o : Warm and " well perfused. Distal pulses present. No pitting pedal edema.  · Imaging  o Imaging  o : CT scan of the chest on 10/23/2020 showed mild coronary artery atherosclerotic disease.   · EKG  o EKG  o : Performed in the office today.  o Indications  o : Shortness of breath.  o Results  o : Normal sinus rhythm with a normal axis. Nonspecific ST-segment changes in the inferior leads. Otherwise normal EKG.  o Comparison  o : No previous EKG available for comparison.   · Labs  o Labs  o : 10/22/2020, hemoglobin 16.9, hematocrit 51.3, platelet count 221,000, sodium 141, potassium 4.0, chloride 104, BUN 9, creatinine 0.98, AST 16, ALT 18, alkaline phosphatase 81, triglycerides 162, total cholesterol 155, LDL 83, HDL 40, TSH 4.1.          Assessment     ASSESSMENT & PLAN:     1.  Coronary artery calcification.  Incidental finding on CT scan.  Patient has multiple risk factors of coronary        artery disease, including hypertension, hyperlipidemia, previous smoking, and strong family history of        premature coronary artery disease.  He also had a previous stress test, which showed an apical infarct with        ischemia.  He also has some shortness of breath on activities.  At this time, we will proceed with a SPECT        stress test to rule out reversible ischemia.  Patient will also need an echocardiogram to assess the LV        systolic and diastolic function and to rule out any significant valvular abnormalities.  Continue rosuvastatin.         Encouraged patient to start taking aspirin 81 mg p.o. daily.    2.  Hypertension.  Well controlled.  3.  Follow up with echo and SPECT reports.        Yeison Ibarra MD  JV:vm             Electronically Signed by: Vanessa Bueno-, Other -Author on December 8, 2020 10:36:38 AM  Electronically Co-signed by: Yeison Ibarra MD -Reviewer on December 8, 2020 12:50:10 PM

## 2021-05-11 ENCOUNTER — OFFICE VISIT CONVERTED (OUTPATIENT)
Dept: PULMONOLOGY | Facility: CLINIC | Age: 61
End: 2021-05-11
Attending: INTERNAL MEDICINE

## 2021-05-11 NOTE — H&P
History and Physical      Patient Name: Reinier Moctezuma   Patient ID: [id number]   Sex: Male   YOB: 1960    Primary Care Provider: Lasha Connell PA-C   Referring Provider: Lasha Connell PA-C    Visit Date: April 14, 2021    Provider: Mirza Coy MD   Location: [facility]   Location Address: [address] [place] [address]  [id number]   Location Phone: [phone]          Chief Complaint  · Bilateral Hip Pain      History Of Present Illness  Reinier Moctezuma is a 61 year old /White male who presents today to [facility].      Patient presents today for an evaluation of bilateral hip pain. Patient states that he has the most problems he has with bilateral hips is walking up and down steps as well as uneven ground. He has been having hip pain for several years and his only treatment was seeing a chiropractor. He localizes his pain on the lateral aspect of his hip, he denies any groin pain. There are days it feels like his hips are giving way.       Past Medical History  Allergic rhinitis; Allergic rhinitis due to allergen; Chronic Obstructive Pulmonary Disease; Erectile dysfunction; Erectile Dysfunction, Organic; Essential hypertension; GERD; GERD (gastroesophageal reflux disease); Hyperlipidemia; Hypertension; Hypothyroidism; Nicotine dependence; Peyronie's Disease; Polycythemia secondary to smoking; Prostatitis; Reflux Disease; Sinus Trouble; unspecified; Tobacco use disorder; Wrist sprain         Past Surgical History  Colonoscopy; Cyst Removal; Cystoscopy; Heel Spur; nose; Shoulder surgery; Vasectomy         Medication List  amlodipine 10 mg oral tablet; Anoro Ellipta 62.5-25 mcg/actuation inhalation blister with device; atenolol-chlorthalidone 100-25 mg oral tablet; Crestor 40 mg oral tablet; Prevacid 15 mg oral capsule,delayed release(DR/EC); Suprep Bowel Prep Kit 17.5-3.13-1.6 gram oral recon soln; Synthroid 50 mcg oral tablet; tadalafil 20 mg oral tablet; Tenoretic  "100 100-25 mg oral tablet; Vitamin D2 1,250 mcg (50,000 unit) oral capsule         Allergy List  NO KNOWN DRUG ALLERGIES       Allergies Reconciled  Family Medical History  Heart Disease; Diabetes, unspecified type; Renal Calculus; Family history of colon cancer; Family history of lung cancer; Family history of liver cancer; Family history of aneurysm         Social History  Active but no formal exercise; Alcohol (Current every day); ; ; No known infection risk; Tobacco (Former)         Immunizations  Name Date Admin   Hepatitis A 03/01/2019   Influenza 10/22/2020   Influenza 10/16/2015         Review of Systems  · Constitutional  o Denies  o : fever, chills, weight loss  · Cardiovascular  o Denies  o : chest pain, shortness of breath  · Gastrointestinal  o Denies  o : liver disease, heartburn, nausea, blood in stools  · Genitourinary  o Denies  o : painful urination, blood in urine  · Integument  o Denies  o : rash, itching  · Neurologic  o Denies  o : headache, weakness, loss of consciousness  · Musculoskeletal  o Denies  o : painful, swollen joints  · Psychiatric  o Denies  o : drug/alcohol addiction, anxiety, depression      Vitals  Date Time BP Position Site L\R Cuff Size HR RR TEMP (F) WT  HT  BMI kg/m2 BSA m2 O2 Sat FR L/min FiO2 HC       04/14/2021 08:21 AM      57 - R   202lbs 0oz 5'  10\" 28.98 2.13 98 %            Physical Examination  · Constitutional  o Appearance  o : well developed, well-nourished, no obvious deformities present  · Head and Face  o Head  o :   § Inspection  § : normocephalic  o Face  o :   § Inspection  § : no facial lesions  · Eyes  o Conjunctivae  o : conjunctivae normal  o Sclerae  o : sclerae white  · Ears, Nose, Mouth and Throat  o Ears  o :   § External Ears  § : appearance within normal limits  § Hearing  § : intact  o Nose  o :   § External Nose  § : appearance normal  · Neck  o Inspection/Palpation  o : normal appearance  o Range of Motion  o : full " range of motion  · Respiratory  o Respiratory Effort  o : breathing unlabored  o Inspection of Chest  o : normal appearance  o Auscultation of Lungs  o : no audible wheezing or rales  · Cardiovascular  o Heart  o : regular rate  · Gastrointestinal  o Abdominal Examination  o : soft and non-tender  · Skin and Subcutaneous Tissue  o General Inspection  o : intact, no rashes  · Psychiatric  o General  o : Alert and oriented x3  o Judgement and Insight  o : judgment and insight intact  o Mood and Affect  o : mood normal, affect appropriate  · Right Hip  o Inspection  o : Sensation grossly intact. Neurovascular intact. Skin intact. Good strength in quadriceps, hamstrings, dorsiflexors, and plantar flexors. Good tone of hip flexors, hip extensors, hip adductor, hip abductors. Tender greater trochanter. Full weight bearing. Non-antalgic gait. No swelling, skin discoloration or atrophy. Full hip range of motion. Full leg raise. No groin pain with range of motion.   · Left Hip  o Inspection  o : Sensation grossly intact. Neurovascular intact. Skin intact. Good strength in quadriceps, hamstrings, dorsiflexors, and plantar flexors. Good tone of hip flexors, hip extensors, hip adductor, hip abductors. Tender greater trochanter. Full weight bearing. Non-antalgic gait. No swelling, skin discoloration or atrophy. Full hip range of motion. Full leg raise. No groin pain with range of motion.   · In Office Procedures  o View  o : AP/LATERAL  o Site  o : bilateral, hip   o Indication  o : Bilateral hip pain  o Study  o : X-rays ordered, taken in the office, and reviewed today.  o Xray  o : No significant degenerative changes of bilateral hips.           Assessment  · Trochanteric bursitis of both hips       Trochanteric bursitis, right hip     726.5/M70.61  Trochanteric bursitis, left hip     726.5/M70.62  · Bilateral Pain: Hip     719.45/M25.559      Plan  · Orders  o Hip (Left) 2 or more views (includes AP Pelvis) [facility]  ([id number]) - 719.45/M25.559 - 04/14/2021  o Hip (Right) 2 or more views (includes AP Pelvis) [facility]. ([id number]) - 719.45/M25.559 - 04/14/2021  · Medications  o Medications have been Reconciled  o Transition of Care or Provider Policy  · Instructions  o Dr. Coy saw and examined the patient and agrees with plan.   o X-rays reviewed by Dr. Coy.  o Reviewed the patient's Past Medical, Social, and Family history as well as the ROS at today's visit, no changes.  o Call or return if worsening symptoms.  o Exercise handout given.  o Follow Up PRN.  o Discussed treatment plans and diagnosis with the patient. Patient opted for physical therapy, he was given a prescription for this toady. He will consider injections at a later time.   o The above service was scribed by Michaela Fuentes on my behalf and I attest to the accuracy of the note. [email or web address]            Electronically Signed by: Michaela Fuentes-, Other -Author on April 15, 2021 01:32:40 PM  Electronically Co-signed by: Mirza Coy MD -Reviewer on April 18, 2021 07:47:13 PM

## 2021-05-14 VITALS
HEIGHT: 70 IN | SYSTOLIC BLOOD PRESSURE: 140 MMHG | HEART RATE: 62 BPM | WEIGHT: 198 LBS | DIASTOLIC BLOOD PRESSURE: 84 MMHG | BODY MASS INDEX: 28.35 KG/M2

## 2021-05-14 VITALS
HEIGHT: 70 IN | HEART RATE: 57 BPM | OXYGEN SATURATION: 98 % | DIASTOLIC BLOOD PRESSURE: 83 MMHG | WEIGHT: 198.25 LBS | BODY MASS INDEX: 28.38 KG/M2 | SYSTOLIC BLOOD PRESSURE: 125 MMHG

## 2021-05-14 VITALS — WEIGHT: 202 LBS | HEIGHT: 70 IN | BODY MASS INDEX: 28.92 KG/M2 | OXYGEN SATURATION: 98 % | HEART RATE: 59 BPM

## 2021-05-15 VITALS
SYSTOLIC BLOOD PRESSURE: 124 MMHG | BODY MASS INDEX: 26.66 KG/M2 | WEIGHT: 186.25 LBS | HEIGHT: 70 IN | HEART RATE: 50 BPM | DIASTOLIC BLOOD PRESSURE: 69 MMHG | OXYGEN SATURATION: 98 %

## 2021-05-15 VITALS
DIASTOLIC BLOOD PRESSURE: 84 MMHG | SYSTOLIC BLOOD PRESSURE: 129 MMHG | BODY MASS INDEX: 28.54 KG/M2 | HEART RATE: 55 BPM | OXYGEN SATURATION: 95 % | HEIGHT: 70 IN | WEIGHT: 199.37 LBS

## 2021-05-16 VITALS
WEIGHT: 190.12 LBS | DIASTOLIC BLOOD PRESSURE: 73 MMHG | HEIGHT: 70 IN | BODY MASS INDEX: 27.22 KG/M2 | SYSTOLIC BLOOD PRESSURE: 121 MMHG | HEART RATE: 67 BPM | OXYGEN SATURATION: 97 %

## 2021-05-16 VITALS
WEIGHT: 171.12 LBS | DIASTOLIC BLOOD PRESSURE: 80 MMHG | BODY MASS INDEX: 24.5 KG/M2 | HEIGHT: 70 IN | OXYGEN SATURATION: 99 % | SYSTOLIC BLOOD PRESSURE: 120 MMHG | HEART RATE: 53 BPM

## 2021-05-16 VITALS
HEIGHT: 70 IN | SYSTOLIC BLOOD PRESSURE: 123 MMHG | WEIGHT: 180 LBS | OXYGEN SATURATION: 98 % | DIASTOLIC BLOOD PRESSURE: 66 MMHG | BODY MASS INDEX: 25.77 KG/M2 | HEART RATE: 50 BPM

## 2021-05-22 ENCOUNTER — TRANSCRIBE ORDERS (OUTPATIENT)
Dept: CARDIOLOGY | Facility: CLINIC | Age: 61
End: 2021-05-22

## 2021-05-22 DIAGNOSIS — I35.0 AORTIC VALVE STENOSIS, ETIOLOGY OF CARDIAC VALVE DISEASE UNSPECIFIED: Primary | ICD-10-CM

## 2021-05-25 ENCOUNTER — OFFICE VISIT CONVERTED (OUTPATIENT)
Dept: FAMILY MEDICINE CLINIC | Facility: CLINIC | Age: 61
End: 2021-05-25
Attending: PHYSICIAN ASSISTANT

## 2021-05-28 VITALS
SYSTOLIC BLOOD PRESSURE: 117 MMHG | TEMPERATURE: 97.6 F | OXYGEN SATURATION: 96 % | BODY MASS INDEX: 28.75 KG/M2 | HEART RATE: 58 BPM | WEIGHT: 200.4 LBS | DIASTOLIC BLOOD PRESSURE: 62 MMHG | RESPIRATION RATE: 18 BRPM

## 2021-05-28 VITALS
WEIGHT: 196.65 LBS | BODY MASS INDEX: 28.22 KG/M2 | SYSTOLIC BLOOD PRESSURE: 153 MMHG | TEMPERATURE: 99 F | OXYGEN SATURATION: 96 % | RESPIRATION RATE: 18 BRPM | DIASTOLIC BLOOD PRESSURE: 90 MMHG | HEART RATE: 92 BPM

## 2021-05-28 VITALS
WEIGHT: 205.69 LBS | RESPIRATION RATE: 18 BRPM | HEART RATE: 63 BPM | SYSTOLIC BLOOD PRESSURE: 121 MMHG | TEMPERATURE: 97.9 F | OXYGEN SATURATION: 97 % | DIASTOLIC BLOOD PRESSURE: 81 MMHG | BODY MASS INDEX: 29.51 KG/M2

## 2021-05-28 VITALS
OXYGEN SATURATION: 98 % | DIASTOLIC BLOOD PRESSURE: 79 MMHG | HEART RATE: 68 BPM | HEIGHT: 70 IN | SYSTOLIC BLOOD PRESSURE: 144 MMHG | RESPIRATION RATE: 14 BRPM | WEIGHT: 201 LBS | BODY MASS INDEX: 28.77 KG/M2 | TEMPERATURE: 96.9 F

## 2021-05-28 VITALS
OXYGEN SATURATION: 96 % | HEART RATE: 56 BPM | BODY MASS INDEX: 28.63 KG/M2 | RESPIRATION RATE: 14 BRPM | WEIGHT: 200 LBS | TEMPERATURE: 97.1 F | DIASTOLIC BLOOD PRESSURE: 79 MMHG | HEIGHT: 70 IN | SYSTOLIC BLOOD PRESSURE: 125 MMHG

## 2021-05-28 VITALS
TEMPERATURE: 98.1 F | BODY MASS INDEX: 28.63 KG/M2 | OXYGEN SATURATION: 96 % | HEIGHT: 70 IN | HEART RATE: 58 BPM | WEIGHT: 200 LBS | RESPIRATION RATE: 18 BRPM | DIASTOLIC BLOOD PRESSURE: 74 MMHG | SYSTOLIC BLOOD PRESSURE: 128 MMHG

## 2021-05-28 NOTE — PROGRESS NOTES
Patient: YAHIR SILVA     Acct: [id number]     Report: #[id number]  UNIT #: [id number]     : 1960    Encounter Date:2021  PRIMARY CARE: [facility]  ***Signed***  --------------------------------------------------------------------------------------------------------------------  NURSE INTAKE      Visit Type      Established Patient Visit            Chief Complaint      POLYCYTHEMIA            History and Present Illness      Past Hx      Mr. Silva is a 59yo WM who previously saw Dr. Barkley, referred back for evaluation    of elevated H/H.  Pt's lab work reflects elevated hemoglobin/hematocrit since     3/2006 at which time they were 19.1 and 58.3.  Patient is a smoker and he also     has JEAN-PAUL.  He has considered smoking cessation; however, not currently working on    it.  He had a sleep study for JEAN-PAUL in 2016 and found to have moderate sleep     apnea.  Patient reports he only uses O2 per NC @ [facility], not a CPAP machine (as     prescribed).  He begins the night with O2 on; however, by early am it is off.      Patient denies fatigue; feeling good. He is working at least 12hrs per day.  He     reports good appetite and denies fever, headache or dizziness.  History of prior    phlebotomies.            HPI - Hematology Interim      Mr. Silva presents for 6 month follow up appointment to evaluate polycythemia.      Recent phlebotomy        He thinks the last one prior was in  he had was in May 2020       . He denies any chest pain, SOA, or abdominal pain. He does report he is still     a non-smoker. He quit using Chantix. He does report he is compliant with his     bipap machine for sleep apnea. He completed a low dose CT screening in 2020 which was benign.             He  has received phlebotomies every 2 months for hematocrit above 50%.as needed       He is not smoking and is more consistently using his CPAP            Most Recent Lab Findings      CBC 2021      White count 4.29  hemoglobin 16.6 hematocrit 47.7 platelet count 201      AST 21 ALT 27 BUN 11 creatinine 0.83            PAST, FAMILY   Past Medical History      Past Medical History:  Arthritis, COPD, High Cholesterol, Hypertension, Short of    Air, Sleep Apnea, Thyroid Disease      Other Hematology History:        secondary polycythemia            Past Surgical History      Fracture Repair            Family History      Family History:  Lung Cancer            Social History      Lives independently:  Yes      Number of Children:  2      Occupation:  RETIRED      Other Social History:        quit smoking 10/2019            Tobacco Use      Tobacco status:  Former smoker ((1 ppd x 20 y quit 4/16 started back 06/2016     quit 10/2019))            Substance Use      Substance use:  Denies use            REVIEW OF SYSTEMS      General:  Denies: Appetite Change, Fatigue, Fever, Night Sweats, Weight Gain,     Weight Loss      Eye:  Denies Blurred Vision, Denies Corrective Lenses, Denies Diplopia, Denies     Vision Changes      ENT:  Denies Headache, Denies Hearing Loss, Denies Hoarseness, Denies Sore     Throat      Cardiovascular:  Denies Chest Pain, Denies Palpitations      Respiratory:  Denies: Cough, Coughing Blood, Productive Cough, Shortness of Air,    Wheezing      Gastrointestinal:  Denies Bloody Stools, Denies Constipation, Denies Diarrhea,     Denies Nausea/Vomiting, Denies Problem Swallowing, Denies Unable to Control     Bowels      Genitourinary:  Denies Blood in Urine, Denies Incontinence, Denies Painful     Urination      Musculoskeletal:  Denies Back Pain, Denies Muscle Pain, Denies Painful Joints      Integumentary:  Denies Itching, Denies Lesions, Denies Rash      Neurologic:  Denies Dizziness, Denies Numbness\Tingling, Denies Seizures      Psychiatric:  Denies Anxiety, Denies Depression      Endocrine:  Denies Cold Intolerance, Denies Heat Intolerance      Hematologic/Lymphatic:  Denies Bruising, Denies Bleeding, Denies  Enlarged Lymph     Nodes            VITAL SIGNS AND SCORES      Vitals      Weight 205 lbs 11.027 oz / 93.3 kg      Temperature 97.9 F / 36.61 C - Temporal      Pulse 63      Respirations 18      Blood Pressure 121/81 Sitting, Left Arm      Pulse Oximetry 97%, ROOM AIR            Pain Score      Experiencing any pain?:  No      Pain Scale Used:  Numerical      Pain Intensity:  0            Fatigue Score      Experiencing any fatigue?:  No      Fatigue (0-10 scale):  0 (none)            EXAM      General Appearance:  Positive for: Alert, Oriented x3, Cooperative      HEENT:  Positive for: Oropharynx clear      Respiratory:  Positive for: CTAB      Abdomen/Gastro:  Positive for: Normal Active Bowel Sounds      Cardiovascular:  Positive for: RRR      Psychiatric:  Positive for: AAO X 3      Lower Extremities:  Positive for: Edema            PREVENTION      Hx Influenza Vaccination:  Yes      Date Influenza Vaccine Given:  Oct 1, 2020      Influenza Vaccine Declined:  No      2 or More Falls in Past Year?:  No      Fall Past Year with Injury?:  No      Hx Pneumococcal Vaccination:  Yes      Encouraged to follow-up with:  PCP regarding preventative exams.      [facility]      [facility]            ALLERGY/MEDS      Allergies      Coded Allergies:             NO KNOWN ALLERGIES (Unverified , 2/8/21)            Medications      Last Reconciled on 11/2/20 09:08 by ERROL ZUÑIGA,       Aspirin Chew (Aspirin Baby) 81 Mg Tab.chew      81 MG PO QDAY, #30 TAB.CHEW 0 Refills         Reported         1/21/21       Levothyroxine (Levothyroxine) 0.05 Mg Tablet      0.05 MG PO QDAY@07, #30 TAB 0 Refills         Reported         1/21/21       MDI-Albuterol (Ventolin HFA) 8 Gm Hfa.aer.ad      1 PUFFS INH Q4-6H PRN for SHORTNESS OF BREATH, #1 MDI 6 Refills         Prov: ERROL ZUÑIGA [facility]         11/2/20       Umeclidinium/Vilanterol 62.5-25 Mcg Inh (Anoro Ellipta 62.5-25 Mcg Inh) 1 Each     Blst.w.dev      1  PUFF INH QDAY, #1 INH 5 Refills         Prov: ERROL ZUÑIGA [facility]         11/2/20       Cholecalciferol (Vitamin D3) Unknown Strength Tablet      PO QDAY for 30 Days, TAB         Reported         11/2/20       Atenolol/Chlorthalidone 100/25 MG (Tenoretic 100) 1 Tab Tablet      1 TAB PO QDAY, TAB         Reported         5/6/16       Rosuvastatin Calcium (Crestor*) 20 Mg Tablet      20 MG PO QDAY, TAB         Reported         7/24/14       amLODIPine (amLODIPine) 10 Mg Tablet      10 MG PO QDAY, #30 TAB 0 Refills         Reported         7/24/14       Lansoprazole (Lansoprazole) 15 Mg Capsule.dr      15 MG PO QD PRN, CAP         Reported         7/24/14      Medications Reviewed:  No Changes made to meds            IMPRESSION/PLAN      Impression      Secondary erythrocytosis            Diagnosis      Polycythemia secondary to smoking - 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.1            Secondary erythrocytosis - 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            Notes      New Diagnostics      * CBC With Auto Diff, Routine         Dx: Polycythemia secondary to smoking - D75.1      * CMP Comp Metabolic Panel, Routine         Dx: Polycythemia secondary to smoking - D75.1      * CBC With Auto Diff, 6 Months         Dx: Secondary erythrocytosis - 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.1      * CBC With Auto Diff, 2 Months         Dx: Secondary erythrocytosis - 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.1            Plan      Last CBC from January 15 of 2021 patient's hematocrit did not meet criteria for     phlebotomy      We will obtain a new CBC and CMP today and will schedule phlebotomy if     hematocrit over 50%      As patient has quit smoking and has been more compliant with his CPAP his     phlebotomy needs seem to have declined      We will obtain a follow-up CBC in 2 months       follow-up appointment in 6 months            Patient Education      Patient Education Provided:  Yes            Electronically signed by Reva Pedro  02/08/2021 10:35       Disclaimer: Converted document may not contain table formatting or lab diagrams. Please see [email or web address] for the authenticated document.

## 2021-05-28 NOTE — PROGRESS NOTES
Patient: YAHIR SILVA     Acct: [id number]     Report: #[id number]  UNIT #: [id number]     : 1960    Encounter Date:01/15/2020  PRIMARY CARE: [facility]  ***Signed***  --------------------------------------------------------------------------------------------------------------------  NURSE INTAKE      Visit Type      Established Patient Visit            Chief Complaint      LAB RESULTS/3 FOLLOW UP            Referring Provider/Copies To      Primary Care Provider:  M[facility]ING      Copies To:   M[facility]            History and Present Illness      Past History      Mr. Silva is a 57yo WM who previously saw Dr. Barkley, referred back for evaluation    of elevated H/H.  Pt's lab work reflects elevated hemoglobin/hematocrit since     3/2006 at which time they were 19.1 and 58.3.  Patient is a smoker and he also     has JEAN-PAUL.  He has considered smoking cessation; however, not currently working on    it.  He had a sleep study for JEAN-PAUL in 2016 and found to have moderate sleep     apnea.  Patient reports he only uses O2 per [facility] @ [facility], not a CPAP machine (as     prescribed).  He begins the night with O2 on; however, by early am it is off.      Patient denies fatigue; feeling good. He is working at least 12hrs per day.  He     reports good appetite and denies fever, headache or dizziness.  History of prior    phlebotomies.            HPI - Hematology Interim      Patient returns today for follow-up of secondary polycythemia.  Since his last     visit, he stopped smoking in 2019.  He is consistently using his CPAP.      He has been on monthly lab work with phlebotomy to maintain hematocrit <50.  He     has not required 1 for the last 3 months.  He states that he is feeling well.      He denies any headache, blurry vision, chest pain, shortness of breath.  He amandeep    erates phlebotomy well when he needs them.            Most Recent Lab Findings            Item Value  Date Time             White Blood  Count 4.98 10*3/uL 1/15/20 0813             Red Blood Count 5.32 10*6/uL 1/15/20 0813             Hemoglobin 16.5 g/dL 1/15/20 0813             Hematocrit 47.9 % 1/15/20 0813             Mean Corpuscular Volume 90.0 fL 1/15/20 0813             Mean Corpuscular Hemoglobin 31.0 pg 1/15/20 0813             Mean Corpuscular Hemoglobin Concent 34.4 1/15/20 0813             Red Cell Distribution Width 13.2 % 1/15/20 0813             RDW Standard Deviation 44.1 fL 1/15/20 0813             Platelet Count 220 10*3/uL 1/15/20 0813             Mean Platelet Volume 10.3 fL 1/15/20 0813             Granulocytes (%) 55.8 % 1/15/20 0813             Granulocytes # 2.78 10*3/uL 1/15/20 0813             Immature Granulocytes % 0.4 % 1/15/20 0813             Lymphocytes % 21.7 % 1/15/20 0813             Monocytes % 13.1 % H 1/15/20 0813             Eosinophils % 7.6 % H 1/15/20 0813             Basophils % 1.4 % 1/15/20 0813            PAST, FAMILY   Past Medical History      Past Medical History:  Arthritis, COPD, High Cholesterol, Hypertension, Short of    Air, Sleep Apnea, Thyroid Disease      Other Hematology History:        MGUS            Past Surgical History      Fracture Repair            Family History      Family History:  Lung Cancer (FATHER)            Social History      Marital Status:        Lives independently:  Yes      Number of Children:  2      Occupation:  RETIRED      Other Social History:        quit smoking 10/2019            Tobacco Use      Tobacco status:  Current every day smoker (1 ppd x 20 y quit 4/16 started back     06/2016), Former smoker            Alcohol Use      Alcohol intake:  2+ drinks per day            Substance Use      Substance use:  Denies use            REVIEW OF SYSTEMS      General:  Admits: Weight Gain;          Denies: Fatigue      ENT:  Denies Sore Throat      Cardiovascular:  Denies Chest Pain      Respiratory:  Denies: Shortness of Air      Gastrointestinal:  Denies  Diarrhea, Denies Nausea/Vomiting      Musculoskeletal:  Denies Muscle Pain      Integumentary:  Denies Itching      Neurologic:  Denies Dizziness            VITAL SIGNS AND SCORES      Vitals      Weight 200 lbs 6.370 oz / 90.9 kg      Temperature 97.6 F / 36.44 C - Temporal      Pulse 58      Respirations 18      Blood Pressure 117/62 Sitting, Left Arm      Pulse Oximetry 96%, RM AIR            Pain Score      Pain Scale Used:  Numerical      Pain Intensity:  0            Fatigue Score      Fatigue (0-10 scale):  0 (none)            EXAM      General Appearance:  Positive for: Alert, Oriented x3, Cooperative;          Negative for: Acute Distress      Neck:  Positive for: Supple;          Negative for: JVD, Masses      Respiratory:  Positive for: CTAB, Normal Respiratory Effort      Abdomen/Gastro:  Positive for: Normal Active Bowel Sounds, Soft;          Negative for: Tenderness      Cardiovascular:  Positive for: RRR;          Negative for: Gallop, Murmur, Peripheral Edema, Rub      Psychiatric:  Positive for: Appropriate Affect, Intact Judgement            PREVENTION      Hx Influenza Vaccination:  No      Influenza Vaccine Declined:  Yes      2 or More Falls Past Year?:  No      Fall Past Year with Injury?:  No      Hx Pneumococcal Vaccination:  No      Encouraged to follow-up with:  PCP regarding preventative exams.      Chart initiated by      MANOJ MUKHERJEE MA            ALLERGY/MEDS      Allergies      Coded Allergies:             NO KNOWN ALLERGIES (Unverified , 1/15/20)            Medications      Last Reconciled on 1/15/20 08:38 by CHRISTINA LOCK      MDI-Albuterol (Ventolin HFA) 8 Gm Hfa.aer.ad      2 PUFFS INH Q4-6H PRN for DYSPNEA, #1 INH 6 Refills         Prov: Sandip Thorpe         9/12/19       Umeclidinium/Vilanterol 62.5-25 Mcg Inh (Anoro Ellipta 62.5-25 Mcg Inh) 1 Each     Blst.w.dev               Prov: Corin Webb PA-C         3/8/19       MDI-Albuterol (Ventolin HFA) 8 Gm Hfa.aer.ad      1  PUFFS INH Q4-6H PRN for SHORTNESS OF BREATH, #1 MDI 6 Refills         Prov: Corin Webb PA-C         3/8/19       Umeclidinium/Vilanterol 62.5-25 Mcg Inh (Anoro Ellipta 62.5-25 Mcg Inh) 1 Each     Blst.w.dev      1 PUFF INH QDAY, #1 INH 5 Refills         Prov: Corin Webb PA-C         3/8/19       CPAP Compressor (CPAP) 1 Each Each      EACH XX ONCE, #1 0 Refills         Prov: Sandip Thorpe         12/6/18       Atenolol/Chlorthalidone 100/25 MG (Tenoretic 100) 1 Tab Tablet      1 TAB PO QDAY, TAB         Reported         5/6/16       Levothyroxine (Synthroid) 0.025 Mg Tablet      0.025 MG PO QDAY@07, #30 TAB 0 Refills         Reported         3/24/16       Cetirizine Hcl (CETIRIZINE HCL) 10 Mg Tablet      10 MG PO QDAY, #30 TAB 0 Refills         Reported         7/24/14       Rosuvastatin Calcium (Crestor*) 20 Mg Tablet      20 MG PO QDAY, TAB         Reported         7/24/14       amLODIPine (amLODIPine) 10 Mg Tablet      10 MG PO QDAY, #30 TAB 0 Refills         Reported         7/24/14       Lansoprazole (Lansoprazole) 15 Mg Capsule.      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  He has not required phlebotomy for the last several     months.  This may reflect the smoking cessation and more consistent use of CPAP.     Given that he has not required phlebotomy for the last 3 months, I will switch     him to every other month H  back in 6 months to reassess.      New Diagnostics      * CCC CBC With Auto Diff, 01/15/20         Dx: Leukopenia - D72.819      * CCC CBC With Auto Diff, 6 Months         Dx: Polycythemia secondary to smoking - D75.1            Patient Education      Patient Education Provided:  Yes            Electronically signed by CHRISTINA LOCK  01/15/2020 08:38       Disclaimer: Converted document may not  contain table formatting or lab diagrams. Please see [email or web address] [facility] for the authenticated document.

## 2021-05-28 NOTE — PROGRESS NOTES
Patient: YAHIR SILVA     Acct: [id number]     Report: #[id number]  UNIT #: [id number]     : 1960    Encounter Date:2020  PRIMARY CARE: MITCH SPRING  ***Signed***  --------------------------------------------------------------------------------------------------------------------  Chief Complaint      Encounter Date      2020            Primary Care Provider      MITCH SPRING            Referring Provider      MITCH SPRING            Patient Complaint      Patient is complaining of      PT here today for F/U, COPD, JEAN-PAUL            VITALS      Height 5 ft 10 in / 177.8 cm      Weight 201 lbs  / 91.101451 kg      BSA 2.09 m2      BMI 28.8 kg/m2      Temperature 96.9 F / 36.06 C - Temporal      Pulse 68      Respirations 14      Blood Pressure 144/79 Sitting, Right Arm      Pulse Oximetry 98%, room air      Initial Exhaled Nitrous Oxide      Date:  Dec 6, 2018            HPI      The patient is a 60 year old male patient of Dr. Thorpe's with a history of mild     obstructive airway disease and obstructive sleep apnea. The patient states he     stopped smoking about a year ago and states that he is only taking Anoro     occasionally and has not had to use albuterol inhaler in several months. The     patient states he has not had to take any antibiotics or steroids since his last    office visit and denies any hospitalizations since last visit. The patient     denies any increased shortness of air, cough or wheezing. The patient denies any    fever or chills, night sweats, hemoptysis,  purulent sputum production, swollen     glands in head and neck, unintentional weight loss, chest pain or chest tig    htness, abdominal pain, nausea or vomiting or diarrhea. The patient denies  any     headaches, myalgias, sore throat, changes in sense of taste and smell any     coronavirus or flu like symptoms.  The patient states he did have a chest x-ray     ordered by his primary care provider on  10/22/2020 that showed a new 2.1 X 1.2     cm nodular density projecting over the lateral lower mid lung and recommended CT    scan for further evaluation. The patient did have that CT scan performed on     10/22/2020 and it showed no evidence of pulmonary nodule or mass corresponding     to previously questioned abnormality on chest radiograph. It did show some     minimal apical pleural parenchymal scarring. The patient states he has also been    referred to a cardiologist due to some aortic and valvular calcifications seen     on chest x-ray and he is scheduled to see a cardiologist. Upon reviewing the     patient's CPAP compliance report, over the last 90 days the patient wore it for     74 out of 90 days with average daily use of 7 hours, 18 minutes and 26 seconds     with auto titrating CPAP pressure of 5-20 cm of water with a mean pressure of     5.6 cm of water with average apnea hypopnea index of 2.8.  The patient states he    does not have any morning headaches and denies any excessive daytime sleepiness.    The patient is also under the care of Dr. Pepper for polycythemia.             I reviewed the Review of Systems, medical, surgical and family history and agree    with those as entered.      Copies To:   Sandip Thorpe      Constitutional:  Denies: Fatigue, Fever, Weight gain, Weight loss, Chills,     Insomnia, Other      Respiratory/Breathing:  Complains of: Shortness of air; Denies: Wheezing, Cough,    Hemoptysis, Pleuritic pain, Other      Endocrine:  Denies: Polydipsia, Polyuria, Heat/cold intolerance, Diabetes, Other      Eyes:  Denies: Blurred vision, Vision Changes, Other      Ears, nose, mouth, throat:  Denies: Congestion, Dysphagia, Hearing Changes, Nose    Bleeding, Nasal Discharge, Throat pain, Tinnitus, Other      Cardiovascular:  Denies: Chest Pain, Exertional dyspnea, Peripheral Edema, Pa    lpitations, Syncope, Wake up Gasping for air, Orthopnea, Tachycardia, Other       Gastrointestinal:  Denies: Abdominal pain/cramping, Bloody stools, Constipation,    Diarrhea, Melena, Nausea, Vomiting, Other      Genitourinary:  Denies: Dysuria, Urinary frequency, Incontinence, Hematuria,     Urgency, Other      Musculoskeletal:  Denies: Joint Pain, Joint Stiffness, Joint Swelling, Myalgias,    Other      Hematologic/lymphatic:  DENIES: Lymphadenopathy, Bruising, Bleeding tendencies,     Other      Neurologic:  Denies: Headache, Numbness, Weakness, Seizures, Other      Psychiatric:  Denies: Anxiety, Appropriate Effect, Depression, Other      Sleep:  No: Excessive daytime sleep, Morning Headache?, Snoring, Insomnia?, Stop    breathing at sleep?, Other      Integumentary:  Denies: Rash, Dry skin, Skin Warm to Touch, Other            FAMILY/SOCIAL/MEDICAL HX      Surgical History:  Yes: Bladder Surgery (CYSTOSCOPY), Head Surgery (NASAL     SURGERY- OCTOBER 2011), Orthopedic Surgery (CYST OFF RT FINGER, HEEL SPURS LEFT     FOOT, RIGHT SHOULDER REPAIR), Other Surgeries; No: AAA Repair, Abdominal     Surgery, Adenoids, Angioplasty, Appendectomy, Back Surgery, Bowel Surgery,     Breast Surgery, CABG, Carotid Stenosis, Cholecystectomy, Ear Surgery, Eye     Surgery, Hernia Surgery, Kidney Surgery, Nose Surgery, Oral Surgery,     Prostatectomy, Rectal Surgery, Spinal Surgery, Testicular Surgery, Throat     Surgery, Tonsils, Valve Replacement, Vascular Surgery      Stroke - Family Hx:  Mother      Heart - Family Hx:  Father      Diabetes - Family Hx:  Brother      Cancer/Type - Family Hx:  Father      Smoking status:  Former smoker ((1 ppd x 20 y quit 4/16 started back 06/2016     quit 10/2019))      Anticoagulation Therapy:  No      Antibiotic Prophylaxis:  No      Medical History:  Yes: Arthritis (BILATERAL WRISTS), Asthma (AS A CHILD), Blood     Disease (POLYCYTHEMIA), Chronic Bronchitis/COPD, Hemorrhoids/Rectal Prob     (REFLUX), High Blood Pressure, Shortness Of Breath (HX OF PNEUMONIA 03/11),      Thyroid Problem, Miscellaneous Medical/oth (ulcers); No: Anemia, Broken Bones,     Cataracts, Chemical Dependency, Chemotherapy/Cancer, Emphysema, Chronic Liver     Disease, Colon Trouble, Colitis, Diverticulitis, Congestive Heart Failu,     Deafness or Ringing Ears, Depression, Anxiety, Bipolar Disorder, PTSD, Diabetes,    Epilepsy, Seizures, Glaucoma, Gall Stones, Gout, Head Injury, Heart Attack,     Heart Murmur, GERD, Hepatitis, Hiatal Hernia, HIV (Do not ask - volu, Kidney or     Bladder Disease, Kidney Stones, Migrane Headaches, Mitral Valve Prolapse,     Psychiatric Care, Reflux Disease, Rheumatic Fever, Sexually Transmitted Dis,     Sinus Trouble, Skin Disease/Psoriais/Ecz, Stroke, Tuberculosis or Pos TB Te      Psychiatric History      none            PREVENTION      Hx Influenza Vaccination:  Yes      Date Influenza Vaccine Given:  Oct 1, 2020      Influenza Vaccine Declined:  No      2 or More Falls in Past Year?:  No      Fall Past Year with Injury?:  No      Hx Pneumococcal Vaccination:  Yes      Encouraged to follow-up with:  PCP regarding preventative exams.      Chart initiated by      Marie Jose CMA            ALLERGIES/MEDICATIONS      Allergies:        Coded Allergies:             NO KNOWN ALLERGIES (Unverified , 8/31/20)      Medications    Last Reconciled on 11/2/20 09:08 by DENEEN OSMAN-Albuterol (Ventolin HFA) 8 Gm Hfa.aer.ad      1 PUFFS INH Q4-6H PRN for SHORTNESS OF BREATH, #1 MDI 6 Refills         Prov[facility]         11/2/20       Umeclidinium/Vilanterol 62.5-25 Mcg Inh (Anoro Ellipta 62.5-25 Mcg Inh) 1 Each     Blst.w.dev      1 PUFF INH QDAY, #1 INH 5 Refills         Prov: [facility]         11/2/20       Cholecalciferol (Vitamin D3) Unknown Strength Tablet      PO QDAY for 30 Days, TAB         Reported         11/2/20       Atenolol/Chlorthalidone 100/25 MG (Tenoretic 100) 1 Tab Tablet      1 TAB PO QDAY, TAB         Reported          5/6/16       Levothyroxine (Synthroid) 0.025 Mg Tablet      0.025 MG PO QDAY@07, #30 TAB 0 Refills         Reported         3/24/16       Cetirizine Hcl (CETIRIZINE HCL) 10 Mg Tablet      10 MG PO QDAY, #30 TAB 0 Refills         Reported         7/24/14       Rosuvastatin Calcium (Crestor*) 20 Mg Tablet      20 MG PO QDAY, TAB         Reported         7/24/14       amLODIPine (amLODIPine) 10 Mg Tablet      10 MG PO QDAY, #30 TAB 0 Refills         Reported         7/24/14       Lansoprazole (Lansoprazole) 15 Mg Capsule.dr      15 MG PO QD PRN, CAP         Reported         7/24/14      Current Medications      Current Medications Reviewed 11/2/20            EXAM      CONSTITUTIONAL: Pleasant  normal conversant.       EYES : Pink conjunctive, no ptosis, PERRL.       ENMT : Nose and ears appear normal, normal dentition, mild posterior pharyngeal     wall erythema, no sinus tenderness.       Neck: Nontender, no masses, no thyromegaly, no nodules.      Resp : Lungs clear to auscultation bilaterally, no wheezes, rales or rhonchi,     normal work of breathing noted, patient able to speak full sentences without     difficulty.       CVS  : No carotid bruits, s1s2 nl, RRR, no murmur, rubs or gallop, no peripheral    edema       Chest wall: Normal rise with inspiration, nontender on palpation.      GI   : Abdomen soft, with no masses, no hepatosplenomegaly, no hernias, BS+      MSK  : Normal gait and station, no digital cyanosis or clubbing       Skin : No rashes, ulcerations or lesions, normal turgor and temperature      Neuro: CN II - XII intact, no sensory deficits, DTRs intact and symmetrical, no     motor weakness      Psych: Appropriate affect, A   Vitals      Vitals:             Height 5 ft 10 in / 177.8 cm           Weight 201 lbs  / 91.563163 kg           BSA 2.09 m2           BMI 28.8 kg/m2           Temperature 96.9 F / 36.06 C - Temporal           Pulse 68           Respirations 14           Blood Pressure 144/79  Sitting, Right Arm           Pulse Oximetry 98%, room air            REVIEW      Results Reviewed      PCCS Results Reviewed?:  Yes Prev Lab Results, Yes Prev Radiology Results, Yes     Previous Mecial Records      Lab Results      I personally reviewed Dr. Thorpe's last office note.      Radiographic Results               [facility]                PACS RADIOLOGY REPORT            Patient: YAHIR SILVA   Acct: #[id number]   Report: #[id number]            UNIT #: [id number]    DOS: 10/23/20 1000      INSURANCE:[employer]   ORDER #:[id number]      LOCATION:[facility]     : 1960            PROVIDERS      ADMITTING:     ATTENDING: MITCH SPRING      FAMILY:  NONE,MD   ORDERING:  MITCH SPRING         OTHER:    DICTATING:  AMADOR DÍAZ MD            REQ #:[id number]   EXAM:CHWO - CT CHEST without CONTRAST      REASON FOR EXAM:        REASON FOR VISIT:  ABN CHEST XRAY            *******Signed******         PROCEDURE:   CT CHEST WITHOUT CONTRAST             COMPARISON:   [facility], CR, CHEST PA/AP   10/22/2020, 8:34.  [facility], CT, CT LOW DOSE CHEST SCREENING, 2020, 11:20.             INDICATIONS:   ABNORMAL CXR, SOA, COUGHING, COPD             TECHNIQUE:   CT images were created without the administration of contrast     material.               PROTOCOL:     Standard imaging protocol performed                RADIATION:     DLP: 380.3mGy*cm          Automated exposure control was utilized to minimize radiation dose.              FINDINGS:         The visualized soft tissue structures at the base and neck including the thyroid    appear within       normal limits.  There is no lower cervical or axillary adenopathy.  There is     bilateral nodular       gynecomastia.             The heart size is normal.  There is no pericardial effusion.  The aorta and main    pulmonary artery       is  normal in caliber.  There is no mediastinal or hilar lymphadenopathy by size     criteria.        Esophagus is normal in course and caliber.  There is coronary artery     atherosclerotic calcification.             The tracheobronchial tree is patent.  There is minimal biapical pleural     parenchymal scarring.        There is no abnormal bronchial wall thickening or bronchiectasis.  There is no     acute airspace       consolidation, pleural effusion, or pneumothorax.  There are no pulmonary     nodules or masses       corresponding to the previously questioned abnormality on chest radiograph.             Visualized portions of the upper abdomen demonstrate no acute findings.  There     are multilevel       degenerative changes of the thoracic spine.  There is calcified ligamentum     flavum thickening at the       T4-T5 level.             CONCLUSION:         1. No evidence of pulmonary nodule or mass corresponding to the previously     questioned abnormality       on chest radiograph.      2. Minimal biapical pleural parenchymal scarring.               AMADOR DÍAZ MD             Electronically Signed and Approved By: AMADOR DÍAZ MD on 10/23/2020 at     11:11                               Until signed, this is an unconfirmed preliminary report that may contain      errors and is subject to change.                                              BATB1:      D:10/23/20 1111                     [facility]                PACS RADIOLOGY REPORT            Patient: YAHIR SILVA   Acct: #[id number]   Report: #[id number]            UNIT #: [id number]    DOS: 10/22/20 0826      INSURANCE:[facility]   ORDER #:[id number]      LOCATION:[facility]     : 1960            PROVIDERS      ADMITTING:     ATTENDING: MITCH SPRING      FAMILY:  NONE,MD   ORDERING:  MITCH SPRING         OTHER:    DICTATING:  MAVERICK LIZ MD            REQ  #:[id number]   EXAM:CXR2 - CHEST 2V AP PA LAT      REASON FOR EXAM:        REASON FOR VISIT:  NICOTINE DEPENDENCE            *******Signed******         PROCEDURE:   CHEST AP/PA AND LATERAL             COMPARISON:   [facility], CR, CHEST AP/PA 1 VIEW, 2/06/2009, 2:33.             INDICATIONS:   Nicotine dependence; No chest complaints today             FINDINGS:         There is pulmonary hyperinflation suggesting emphysema.  There is a 2.1 x 1.2     centimeter nodule in       the left lateral lower mid lung seen on the frontal view only.  This was not     present on the prior       study.  Recommend correlation with chest CT given the patient's smoking history.     Pulmonary       vascularity appears within normal limits.  The heart size and mediastinal cont    our appear within       normal limits and stable.  No pleural fluid is seen.  Aortic vascular     calcification is present.        There is mild degenerative change in the spine.             CONCLUSION:                1. New 2.1 x 1.2 centimeter nodular density projecting over the lateral lower     mid left lung.        Recommend further evaluation by chest CT to rule out lung cancer, given the     patient's smoking       history.             2. Pulmonary emphysematous changes.                MAVERICK LIZ MD             Electronically Signed and Approved By: MAVERICK LIZ MD on 10/22/2020 at 8:45                               Until signed, this is an unconfirmed preliminary report that may contain      errors and is subject to change.                                              WHIK1:      D:10/22/20 0845            Assessment      Notes      New Medications      * Cholecalciferol (Vitamin D3) Unknown Strength TABLET: PO QDAY 30 Days      * Umeclidinium/Vilanterol 62.5-25 Mcg Inh (Anoro Ellipta 62.5-25 Mcg Inh) 1 EACH      BLST.W.DEV          Sample - Qty 3      Renewed Medications      * Umeclidinium/Vilanterol 62.5-25 Mcg Inh (Anoro Ellipta  62.5-25 Mcg Inh) 1 EACH      BLST.W.DEV: 1 PUFF INH QDAY #1      * MDI-Albuterol (Ventolin HFA) 8 GM HFA.AER.AD: 1 PUFFS INH Q4-6H PRN SHORTNESS       OF BREATH #1      ASSESSMENT:      1. Mild chronic obstructive pulmonary disease.       2. Obstructive sleep apnea.      3. Suspicious pulmonary nodule seen on chest x-ray. CT scan of the chest did not    show any corresponding pulmonary nodule, see scanned reports please.       4. Tobacco abuse of cigarettes in remission.             PLAN:      1. The patient is advised to take Anoro daily instead of sporadically. Risk of     not taking medications as prescribed was discussed and the patient verbalized     understanding.       2. Use albuterol inhaler as needed.       3. The patient reports he is up to date with flu and Pneumovax vaccines. The     patient will need Prevnar at age 63.       4. I congratulated the patient on quitting smoking. The patient is advised to     refrain from tobacco use and avoid tobacco exposure.       5. For obstructive sleep apnea, continue CPAP at current settings. The patient     is advised to use CPAP every night. 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  Risks of not using his CPAP machine on a     nightly basis was discussed and the patient verbalized understanding. The     patient is advised to clean mask and tubing daily.       6. The patient will be due for repeat low dose CT scan of the chest again in     October 2021. We will order this closer to that date.       7. The patient's chest x-ray showed a 2.1 X 1.2 cm nodular density projecting     over the lateral lower mid left lung however the patient had a CT scan of the     chest performed and it did not show any evidence of pulmonary nodule or mass     corresponding to previously questioned abnormality on chest x-ray.       8. The patient is advised to call the office, call 911 or go to the ER for any     new or worsening symptoms.        9. Follow up with Dr. Thorpe in 5-6 months, sooner if needed.            Patient Education      Patient Education Provided:  COPD      Time Spent:  > 50% /Coord Care            Electronically signed by ERROL ZUÑIGA [facility]  11/03/2020 19:36       Disclaimer: Converted document may not contain table formatting or lab diagrams. Please see [employer] for the authenticated document.

## 2021-05-28 NOTE — PROGRESS NOTES
Patient: YAHIR SILVA     Acct: [id number]     Report: #[id number]  UNIT #: [id number]     : 1960    Encounter Date:08/10/2020  PRIMARY CARE: MITCH SPRING  ***Signed***  --------------------------------------------------------------------------------------------------------------------  NURSE INTAKE      Visit Type      Established Patient Visit            Chief Complaint      POLYCYTHEMIA            Referring Provider/Copies To      Primary Care Provider:  MITCH SPRING            History and Present Illness      Past History      Mr. Silva is a 59yo WM who previously saw Dr. Barkley, referred back for evaluation    of elevated H/H.  Pt's lab work reflects elevated hemoglobin/hematocrit since     3/2006 at which time they were 19.1 and 58.3.  Patient is a smoker and he also     has JEAN-PAUL.  He has considered smoking cessation; however, not currently working on    it.  He had a sleep study for JEAN-PAUL in 2016 and found to have moderate sleep     apnea.  Patient reports he only uses O2 per NC @ [facility], not a CPAP machine (as     prescribed).  He begins the night with O2 on; however, by early am it is off.      Patient denies fatigue; feeling good. He is working at least 12hrs per day.  He     reports good appetite and denies fever, headache or dizziness.  History of prior    phlebotomies.            HPI - Hematology Interim      1) Secondary Polycythemia:             Mr. Silva presents for 6 month follow up appointment to evaluate polycythemia.     He reports he missed an appointment due to COVID. In addition, he reports he has    not had any recent phlebotomies. He thinks the last one he had was in May of     this year. He does report when he has phlebotomies, they seem to help. He denies    any chest pain, SOA, or abdominal pain. He does report he is still a non-smoker.    He quit using Chantix. He does report he is compliant with his bipap machine for    sleep apnea. He completed a low dose CT screening in  January 2020 which was     benign.             He receives phlebotomies every 2 months for hematocrit above 50%.             His BP is elevated today at 153/90. He reports he forgot to take his BP meds     last nite.            PAST, FAMILY   Past Medical History      Past Medical History:  Arthritis, COPD, High Cholesterol, Hypertension, Short of    Air, Sleep Apnea, Thyroid Disease      Other Hematology History:        secondary polycythemia            Past Surgical History      Fracture Repair            Family History      Family History:  Lung Cancer (FATHER)            Social History      Marital Status:        Lives independently:  Yes      Number of Children:  2      Occupation:  RETIRED      Other Social History:        quit smoking 10/2019            Tobacco Use      Tobacco status:  Former smoker (1 ppd x 20 y quit 4/16 started back 06/2016 quit    10/2019)      Currently Vaping:  No            Alcohol Use      Alcohol intake:  2+ drinks per day            Substance Use      Substance use:  Denies use            REVIEW OF SYSTEMS      General:  Denies: Appetite Change, Fatigue, Fever, Night Sweats, Weight Gain,     Weight Loss      Eye:  Denies Blurred Vision, Denies Corrective Lenses, Denies Diplopia, Denies     Vision Changes      ENT:  Denies Headache, Denies Hearing Loss, Denies Hoarseness, Denies Sore     Throat      Cardiovascular:  Denies Chest Pain, Denies Palpitations      Respiratory:  Denies: Cough, Coughing Blood, Productive Cough, Shortness of Air,    Wheezing      Gastrointestinal:  Denies Bloody Stools, Denies Constipation, Denies Diarrhea,     Denies Nausea/Vomiting, Denies Problem Swallowing, Denies Unable to Control     Bowels      Genitourinary:  Denies Blood in Urine, Denies Incontinence, Denies Painful     Urination      Musculoskeletal:  Denies Back Pain, Denies Muscle Pain, Denies Painful Joints      Integumentary:  Denies Itching, Denies Lesions, Denies Rash      Neurologic:   Denies Dizziness, Denies Numbness\Tingling, Denies Seizures      Psychiatric:  Denies Anxiety, Denies Depression      Endocrine:  Denies Cold Intolerance, Denies Heat Intolerance      Hematologic/Lymphatic:  Denies Bruising, Denies Bleeding, Denies Enlarged Lymph     Nodes            VITAL SIGNS AND SCORES      Vitals      Weight 196 lbs 10.405 oz / 89.2 kg      Temperature 99.0 F / 37.22 C - Temporal      Pulse 92      Respirations 18      Blood Pressure 153/90 Sitting, Right Arm      Pulse Oximetry 96%, roomair            Pain Score      Experiencing any pain?:  No      Pain Scale Used:  Numerical      Pain Intensity:  0            Fatigue Score      Experiencing any fatigue?:  No      Fatigue (0-10 scale):  0 (none)            EXAM      General appearance:  in no apparent distress, cooperative, appears stated age.      HEENT: No pallor, no icterus, oral mucosa moist      Neck: Supple, trachea central-not deviated      Lymph nodes: none palpable peripherally      Cardiovascular: S1-S2 heard, no murmurs, no rubs, no gallops.      Breast exam:      Respiratory: Clear to auscultation bilaterally, no adventitious sounds      Abdomen/gastro: Soft, nontender, no palpable hepatosplenomegaly, bowel sounds     heard      Skin: No lesions, no rashes, no petechiae.      Extremities: No pedal edema, peripheral pulses felt, no clubbing      Musculoskeletal: Normal tone, no rigidity of muscles; range of motion intact      Spine: No deformities      Psychiatric: Alert and oriented x3, appropriate affect, intact judgment      Neurologic: Cranial nerves II through XII grossly intact, no gross neurological     deficits noted.            PREVENTION      Hx Influenza Vaccination:  No      Influenza Vaccine Declined:  Yes      2 or More Falls in Past Year?:  No      Fall Past Year with Injury?:  No      Hx Pneumococcal Vaccination:  No      Encouraged to follow-up with:  PCP regarding preventative exams.      Chart initiated by       JOHN BEDOLLA [facility]            ALLERGY/MEDS      Allergies      Coded Allergies:             NO KNOWN ALLERGIES (Unverified , 5/11/20)            Medications      Last Reconciled on 8/10/20 09:42 by CRISTAL ZAPIEN MDI-Albuterol (Ventolin HFA) 8 Gm Hfa.aer.ad      1 PUFFS INH Q4-6H PRN for SHORTNESS OF BREATH, #1 MDI 6 Refills         Prov: Corin Webb PA-C         3/8/19       Umeclidinium/Vilanterol 62.5-25 Mcg Inh (Anoro Ellipta 62.5-25 Mcg Inh) 1 Each     Blst.w.dev      1 PUFF INH QDAY, #1 INH 5 Refills         Prov: Corin Webb PA-C         3/8/19       Atenolol/Chlorthalidone 100/25 MG (Tenoretic 100) 1 Tab Tablet      1 TAB PO QDAY, TAB         Reported         5/6/16       Levothyroxine (Synthroid) 0.025 Mg Tablet      0.025 MG PO QDAY@07, #30 TAB 0 Refills         Reported         3/24/16       Cetirizine Hcl (CETIRIZINE HCL) 10 Mg Tablet      10 MG PO QDAY, #30 TAB 0 Refills         Reported         7/24/14       Rosuvastatin Calcium (Crestor*) 20 Mg Tablet      20 MG PO QDAY, TAB         Reported         7/24/14       amLODIPine (amLODIPine) 10 Mg Tablet      10 MG PO QDAY, #30 TAB 0 Refills         Reported         7/24/14       Lansoprazole (Lansoprazole) 15 Mg Capsule.      15 MG PO QD PRN, CAP         Reported         7/24/14      Medications Reviewed:  No Changes made to meds            IMPRESSION/PLAN      Impression      1) Secondary Polycythemia:             Mr. Moctezuma presents for 6 month follow up appointment to evaluate secondary     polycythemia. He reports he missed an appointment due to COVID. In addition, he     reports he has not had any recent phlebotomies. He thinks the last one he had     was in May of this year. He does report when he has phlebotomies, they seem to     help. He denies any chest pain, SOA, or abdominal pain. He does report he is     still a non-smoker. He quit using Chantix. He does report he is compliant with     his bipap machine for sleep  apnea. He completed a low dose CT screening in     January 2020 which was benign.             He receives phlebotomies every 2 months for hematocrit above 50%.             His BP is elevated today at 153/90. He reports he forgot to take his BP meds     last nite.             His recent lab work in May 2020 did show a continued H/H of 18.1 / 53.6.             He will continue PRN phlebotomy for hematocrit greater than 50%.            Diagnosis      Polycythemia secondary to smoking - D75.1            Notes      New Diagnostics      * CBC With Auto Diff, Routine         Dx: Polycythemia secondary to smoking - D75.1            Plan      1) Updated phlebotomy order given for Hematocrit above 50% every 2 months.             Continue yearly low dose CT screening yearly with pulmonary to follow for this.     Continue use of bipap for sleep apnea.             Labwork today: CBC with diff.             Follow up in 6 months with Dr. Pepper with labs prior.            Pain      Pain Zero Today            Advanced Care Plan Discussion      Declines Discussion 1124F            Patient Education            DI for Obstructive Sleep Apnea -- Adult      Therapeutic Phlebotomy      Patient Education Provided:  Yes            Electronically signed by CRISTAL ZAPIEN onc  08/10/2020 09:42       Disclaimer: Converted document may not contain table formatting or lab diagrams. Please see [employer] [facility] for the authenticated document.

## 2021-05-28 NOTE — PROGRESS NOTES
Patient: YAHIR SILVA     Acct: [id number]     Report: #[id number]  UNIT #: [id number]     : 1960    Encounter Date:2021  PRIMARY CARE: [facility]  ***Signed***  --------------------------------------------------------------------------------------------------------------------  Chief Complaint      Encounter Date      May 11, 2021            Primary Care Provider      MITCH SPRING            Referring Provider      MITCH SPRING            Patient Complaint      Patient is complaining of      Patient is here for 6 month follow up, COPD/JEAN-PAUL            VITALS      Height 5 ft 10 in / 177.8 cm      Weight 200 lbs 0 oz / 90.68744 kg      BSA 2.09 m2      BMI 28.7 kg/m2      Temperature 97.1 F / 36.17 C - Tympanic      Pulse 56      Respirations 14      Blood Pressure 125/79 Sitting, Left Arm      Pulse Oximetry 96%, room air      Initial Exhaled Nitrous Oxide      Date:  Dec 6, 2018            HPI      The patient is a  61 year old male with history of mild obstructive airway     disease and obstructive sleep apnea. He is on anoro once a day and albuterol as     needed. He says he is not using anoro daily, only uses it intermittently and has    not needed his rescue inhaler at all. He has no fever or chills, no nausea or     vomiting, no chest pain or chest tightness. He no cough, no significant changes     in weight or appetite. He had CT scan of the chest on 10/23/20 that showed no     evidence of pulmonary nodules or masses. I reviewed his previous scan with him     today.            ROS      Constitutional:  Denies: Fatigue, Fever, Weight gain, Weight loss, Chills,     Insomnia, Other      Respiratory/Breathing:  Denies: Shortness of air, Wheezing, Cough, Hemoptysis,     Pleuritic pain, Other      Endocrine:  Denies: Polydipsia, Polyuria, Heat/cold intolerance, Diabetes, Other      Eyes:  Denies: Blurred vision, Vision Changes, Other      Ears, nose, mouth, throat:  Denies: Congestion,  Dysphagia, Hearing Changes, Nose    Bleeding, Nasal Discharge, Throat pain, Tinnitus, Other      Cardiovascular:  Denies: Chest Pain, Exertional dyspnea, Peripheral Edema,     Palpitations, Syncope, Wake up Gasping for air, Orthopnea, Tachycardia, Other      Gastrointestinal:  Denies: Abdominal pain/cramping, Bloody stools, Constipation,    Diarrhea, Melena, Nausea, Vomiting, Other      Genitourinary:  Denies: Dysuria, Urinary frequency, Incontinence, Hematuria,     Urgency, Other      Musculoskeletal:  Denies: Joint Pain, Joint Stiffness, Joint Swelling, Myalgias,    Other      Hematologic/lymphatic:  DENIES: Lymphadenopathy, Bruising, Bleeding tendencies,     Other      Neurologic:  Denies: Headache, Numbness, Weakness, Seizures, Other      Psychiatric:  Denies: Anxiety, Appropriate Effect, Depression, Other      Sleep:  No: Excessive daytime sleep, Morning Headache?, Snoring, Insomnia?, Stop    breathing at sleep?, Other      Integumentary:  Denies: Rash, Dry skin, Skin Warm to Touch, Other            FAMILY/SOCIAL/MEDICAL HX      Surgical History:  Yes: Bladder Surgery (CYSTOSCOPY), Head Surgery (NASAL     SURGERY- OCTOBER 2011), Orthopedic Surgery (CYST OFF RT FINGER, HEEL SPURS LEFT     FOOT, RIGHT SHOULDER REPAIR), Other Surgeries; No: AAA Repair, Abdominal Surge    ry, Adenoids, Angioplasty, Appendectomy, Back Surgery, Bowel Surgery, Breast     Surgery, CABG, Carotid Stenosis, Cholecystectomy, Ear Surgery, Eye Surgery,     Hernia Surgery, Kidney Surgery, Nose Surgery, Oral Surgery, Prostatectomy,     Rectal Surgery, Spinal Surgery, Testicular Surgery, Throat Surgery, Tonsils,     Valve Replacement, Vascular Surgery      Stroke - Family Hx:  Mother      Heart - Family Hx:  Father      Diabetes - Family Hx:  Brother      Cancer/Type - Family Hx:  Father      Social History:  No Tobacco Use, No Alcohol Use, No Recreational Drug use      Smoking status:  Former smoker ((1 ppd x 20 y quit 4/16 started back  06/2016     quit 10/2019))      Anticoagulation Therapy:  No      Antibiotic Prophylaxis:  No      Medical History:  Yes: Arthritis (BILATERAL WRISTS), Asthma (AS A CHILD), Blood     Disease (POLYCYTHEMIA), Chronic Bronchitis/COPD, Hemorrhoids/Rectal Prob     (REFLUX), High Blood Pressure, Shortness Of Breath (HX OF PNEUMONIA 03/11),     Thyroid Problem, Miscellaneous Medical/oth (HX ULCERS AS CHILD ); No: Anemia,     Broken Bones, Cataracts, Chemical Dependency, Chemotherapy/Cancer, Emphysema,     Chronic Liver Disease, Colon Trouble, Colitis, Diverticulitis, Congestive Heart     Failu, Deafness or Ringing Ears, Depression, Anxiety, Bipolar Disorder, PTSD,     Diabetes, Epilepsy, Seizures, Glaucoma, Gall Stones, Gout, Head Injury, Heart     Attack, Heart Murmur, GERD, Hepatitis, Hiatal Hernia, HIV (Do not ask - volu,     Kidney or Bladder Disease, Kidney Stones, Migrane Headaches, Mitral Valve     Prolapse, Psychiatric Care, Reflux Disease, Rheumatic Fever, Sexually     Transmitted Dis, Sinus Trouble, Skin Disease/Psoriais/Ecz, Stroke, Tuberculosis     or Pos TB Te      Psychiatric History      none            PREVENTION      Hx Influenza Vaccination:  Yes      Date Influenza Vaccine Given:  Oct 1, 2020      Influenza Vaccine Declined:  No      2 or More Falls in Past Year?:  No      Fall Past Year with Injury?:  No      Hx Pneumococcal Vaccination:  Yes      Encouraged to follow-up with:  PCP regarding preventative exams.      Chart initiated by      Marie Schreiber MA            ALLERGIES/MEDICATIONS      Allergies:        Coded Allergies:             NO KNOWN ALLERGIES (Unverified , 5/11/21)      Medications    Last Reconciled on 5/11/21 17:04 by MD DENEEN WEINSTEIN-Albuterol (Ventolin HFA) 8 Gm Hfa.aer.ad      1 PUFFS INH Q4-6H PRN for SHORTNESS OF BREATH, #1 MDI 6 Refills         Prov: Sandip Thorpe         5/11/21       Aspirin Chew (Aspirin Baby) 81 Mg Tab.chew      81 MG PO QDAY, #30 TAB.CHEW 0  Refills         Reported         1/21/21       Levothyroxine (Levothyroxine) 0.05 Mg Tablet      0.05 MG PO QDAY@07, #30 TAB 0 Refills         Reported         1/21/21       Cholecalciferol (Vitamin D3) Unknown Strength Tablet      PO QDAY for 30 Days, TAB         Reported         11/2/20       Atenolol/Chlorthalidone 100/25 MG (Tenoretic 100) 1 Tab Tablet      1 TAB PO QDAY, TAB         Reported         5/6/16       Rosuvastatin Calcium (Crestor*) 20 Mg Tablet      20 MG PO QDAY, TAB         Reported         7/24/14       amLODIPine (amLODIPine) 10 Mg Tablet      10 MG PO QDAY, #30 TAB 0 Refills         Reported         7/24/14       Lansoprazole (Lansoprazole) 15 Mg Capsule.dr      15 MG PO QD PRN, CAP         Reported         7/24/14      Current Medications      Current Medications Reviewed 5/11/21            EXAM      CONSTITUTIONAL: Pleasant  normal conversant.       EYES : Pink conjunctive, no ptosis, PERRL.       ENMT : Nose and ears appear normal, normal dentition, mild posterior pharyngeal     wall erythema. Mallampati classification       Neck: Nontender, no masses, no thyromegaly, no nodules.      Resp : Grossly clear to auscultation bilaterally without wheezes, rhonchi or     crackles appreciated.  Normal work of breathing noted.       CVS  : No carotid bruits, s1s2 nl, RRR, no murmur, rubs or gallop, no peripheral    edema       Chest wall: Normal rise with inspiration, nontender on palpation      GI   : Abdomen soft, with no masses, no hepatosplenomegaly, no hernias, BS+      MSK  : Normal gait and station, no digital cyanosis or clubbing       Skin : No rashes, ulcerations or lesions, normal turgor and temperature      Neuro: CN II - XII intact, no sensory deficits, DTRs intact and symmetrical, no     motor weakness      Psych: Appropriate affect, A   Vitals      Vitals:             Height 5 ft 10 in / 177.8 cm           Weight 200 lbs 0 oz / 90.62451 kg           BSA 2.09 m2           BMI 28.7 kg/m2            Temperature 97.1 F / 36.17 C - Tympanic           Pulse 56           Respirations 14           Blood Pressure 125/79 Sitting, Left Arm           Pulse Oximetry 96%, room air            REVIEW      Results Reviewed      PCCS Results Reviewed?:  Yes Prev Lab Results, Yes Prev Radiology Results, Yes     Previous Mecial Records      Radiographic Results               [facility]                PACS RADIOLOGY REPORT            Patient: YAHIR SILVA   Acct: #[id number]   Report: #[id number]            UNIT #: [id number]    DOS: 10/23/20 1000      INSURANCE:[employer]   ORDER #:[id number]      LOCATION:[facility]     : 1960            PROVIDERS      ADMITTING:     ATTENDING: MITCH SPRING      FAMILY:  NONE,MD   ORDERING:  IMTCH SPRING         OTHER:    DICTATING:  AMADOR DÍAZ MD            REQ #:[id number]   EXAM:CHWO - CT CHEST without CONTRAST      REASON FOR EXAM:        REASON FOR VISIT:  ABN CHEST XRAY            *******Signed******         PROCEDURE:   CT CHEST WITHOUT CONTRAST             COMPARISON:   [facility], CR, CHEST PA/AP   10/22/2020, 8:34.  [facility], CT, CT LOW DOSE CHEST SCREENING, 2020, 11:20.             INDICATIONS:   ABNORMAL CXR, SOA, COUGHING, COPD             TECHNIQUE:   CT images were created without the administration of contrast mater    ial.               PROTOCOL:     Standard imaging protocol performed                RADIATION:     DLP: 380.3mGy*cm          Automated exposure control was utilized to minimize radiation dose.              FINDINGS:         The visualized soft tissue structures at the base and neck including the thyroid    appear within       normal limits.  There is no lower cervical or axillary adenopathy.  There is     bilateral nodular       gynecomastia.             The heart size is normal.  There is no pericardial effusion.   The aorta and main    pulmonary artery       is normal in caliber.  There is no mediastinal or hilar lymphadenopathy by size     criteria.        Esophagus is normal in course and caliber.  There is coronary artery     atherosclerotic calcification.             The tracheobronchial tree is patent.  There is minimal biapical pleural     parenchymal scarring.        There is no abnormal bronchial wall thickening or bronchiectasis.  There is no     acute airspace       consolidation, pleural effusion, or pneumothorax.  There are no pulmonary     nodules or masses       corresponding to the previously questioned abnormality on chest radiograph.             Visualized portions of the upper abdomen demonstrate no acute findings.  There     are multilevel       degenerative changes of the thoracic spine.  There is calcified ligamentum     flavum thickening at the       T4-T5 level.             CONCLUSION:         1. No evidence of pulmonary nodule or mass corresponding to the previously     questioned abnormality       on chest radiograph.      2. Minimal biapical pleural parenchymal scarring.               AMADOR DÍAZ MD             Electronically Signed and Approved By: AMADOR DÍAZ MD on 10/23/2020 at     11:11                               Until signed, this is an unconfirmed preliminary report that may contain      errors and is subject to change.                                              BATB1:      D:10/23/20 1111      PFT Results      Patient: YAHIR SILVA   Acct: #[id number]   Report: #[id number]            UNIT #: [id number]    DOS:       LOCATION:[facility]     : 1960            PROVIDERS      ADMITTING:     FAMILY:  MITCH SPRING         OTHER:       DICTATING:  Sandip Thorpe MD            REASON FOR VISIT:  D75.1            *******Signed******                                    [facility]                          [employer] Management Services                             [place]  [id number]               __________________________________________________________________________             Patient Name:                   Attending Physician:      Reinier Moctezuma M.D.             Patient Visit # MR #            Admit Date  Disch Date     Location      [id number]    [id number]      12/20/2018                 [facility]S- -             Date of Birth      1960      __________________________________________________________________________      821 - DIAGNOSTIC REPORT             SIX-MINUTE WALK TEST INTERPRETATION:      The patient walked for a total distance of 1200 feet at 2.2 mph and MET level      of 2.7.  There was no significant desaturation during ambulation.  Oxygen      saturation started at 95% and dropped down to 93% at tramaine.  Dyspnea scale      was 1.  Rate of perceived exertion was 2.             CONCLUSION:      Suboptimal walk distance of 1200 feet without significant desaturation during      6-minute walk test.  Please correlate clinically.             To be electronically signed in [employer]      [id number] MAHAD THORPE M.D.             NK:aw      D:  01/04/2019 12:56      T:  01/04/2019 15:55      #[id number]             Until signed, this is an unconfirmed preliminary report that may contain      errors and is subject to change.                   Until signed, this is an unconfirmed preliminary report that may contain      errors and is subject to change.                     <Electronically signed by Mahad Thorpe MD>                01/06/19 1937               Mahad Thorpe MD:MARIO      D:01/04/19 1256            Assessment      Smoking greater than 40 pack years - F17.210            Notes      Renewed Medications      * MDI-Albuterol (Ventolin HFA) 8 GM HFA.AER.AD: 1 PUFFS INH Q4-6H PRN SHORTNESS       OF BREATH #1      Discontinued Medications      *  Umeclidinium/Vilanterol 62.5-25 Mcg Inh (Anoro Ellipta 62.5-25 Mcg Inh) 1 EACH      BLST.W.DEV: 1 PUFF INH QDAY #1      New Diagnostics      * Low Dose Chest CT, 6 Months         Dx: Smoking greater than 40 pack years - F17.210      PLAN:      The patient is a 61 year old male with mild obstructive airway disease and     obstructive sleep apnea. He is currently not using maintenance inhaler.             1. Obstructive airway disease, mild. Continue with albuterol as needed and stop     anoro. He will need low dose lung cancer screening CT scan of the chest in 6     months, I will order CT scan of the chest in 6 months from now.       2. Follow up in 6 months, earlier if needed.            Patient Education      Education resources provided:  Yes      Patient Education Provided:  Acute Bronchitis            Electronically signed by Sandip Thorpe  05/23/2021 14:28       Disclaimer: Converted document may not contain table formatting or lab diagrams. Please see [facility] [facility] for the authenticated document.

## 2021-05-28 NOTE — PROGRESS NOTES
Patient: YAHIR SILVA     Acct: [id number]     Report: #[id number]  UNIT #: [id number]     : 1960    Encounter Date:2020  PRIMARY CARE: MIT[facility] SPRING  ***Signed***  --------------------------------------------------------------------------------------------------------------------  Chief Complaint      Encounter Date      2020            Primary Care Provider      MITCH SPRING            Referring Provider      MITCH SPRING            Patient Complaint      Patient is complaining of      F/U CT SCAN RESULTS            VITALS      Height 5 ft 10 in / 177.8 cm      Weight 200 lbs 0 oz / 90.80902 kg      BSA 2.09 m2      BMI 28.7 kg/m2      Temperature 98.1 F / 36.72 C - Oral      Pulse 58      Respirations 18      Blood Pressure 128/74 Sitting, Right Arm      Pulse Oximetry 96%, room air      Initial Exhaled Nitrous Oxide      Date:  Dec 6, 2018      Exhaled Nitrous Oxide Results:  11            HPI      The patient is a 59 year old male with a history of mild obstructive airway     disease with COPD and obstructive sleep apnea. He use to smoke up to one half     pack per day.  He tried Chantix and was able to quit. He had gone to [place]     and had smoked there, but once he came back he has not smoked and says he has     not smoked for close to three months now. He has been taking Anoro, but is using    it only as needed. He is using CPAP daily, average daily use is 6 hours and 50     minutes with  AutoPAP.  His CPAP mean pressure is around 5.7 and average peak     pressure is 8.7.  His apnea/hypopnea index on it is 5.  He feels overall okay.     He still is dipping tobacco, but is not smoking. He has no chest pain or chest     tightness, no nausea or vomiting, no fever or chills.  I reviewed his CPAP usage    data and reviewed his pulmonary function test again today.  The patient had low     dose lung cancer screening CT scan done which was reviewed.            ROS       Constitutional:  Complains of: Fatigue; Denies: Fever, Weight gain, Weight loss,    Chills, Insomnia, Other      Respiratory/Breathing:  Complains of: Shortness of air, Wheezing, Cough; Denies:    Hemoptysis, Pleuritic pain, Other      Endocrine:  Denies: Polydipsia, Polyuria, Heat/cold intolerance, Diabetes, Other      Eyes:  Denies: Blurred vision, Vision Changes, Other      Ears, nose, mouth, throat:  Denies: Mouth lesions, Thrush, Throat pain,     Hoarseness, Allergies/Hay Fever, Post Nasal Drip, Headaches, Recent Head Injury,    Nose Bleeding, Neck Stiffness, Thyroid Mass, Hearing Loss, Ear Fullness, Dry     Mouth, Nasal or Sinus Pain, Dry Lips, Nasal discharge, Nasal congestion, Other      Cardiovascular:  Denies: Palpitations, Syncope, Claudication, Chest Pain, Wake     up Gasping for air, Leg Swelling, Irregular Heart Rate, Cyanosis, Dyspnea on     Exertion, Other      Gastrointestinal:  Denies: Nausea, Constipation, Diarrhea, Abdominal pain,     Vomiting, Difficulty Swallowing, Reflux/Heartburn, Dysphagia, Jaundice,     Bloating, Melena, Bloody stools, Other      Genitourinary:  Denies: Urinary frequency, Incontinence, Hematuria, Urgency,     Nocturia, Dysuria, Testicular problems, Other      Musculoskeletal:  Denies: Joint Pain, Joint Stiffness, Joint Swelling, Myalgias,    Other      Hematologic/lymphatic:  DENIES: Lymphadenopathy, Bruising, Bleeding tendencies,     Other      Neurological:  Denies: Headache, Numbness, Weakness, Seizures, Other      Psychiatric:  Denies: Anxiety, Appropriate Effect, Depression, Other      Sleep:  No: Excessive daytime sleep, Morning Headache?, Snoring, Insomnia?, Stop    breathing at sleep?, Other      Integumentary:  Denies: Rash, Dry skin, Skin Warm to Touch, Other      Immunologic/Allergic:  Denies: Latex allergy, Seasonal allergies, Asthma,     Urticaria, Eczema, Other      Immunization status:  No: Up to date            FAMILY/SOCIAL/MEDICAL HX      Surgical  History:  Yes: Bladder Surgery (CYSTOSCOPY), Head Surgery (NASAL     SURGERY- OCTOBER 2011), Orthopedic Surgery (CYST OFF RT FINGER, HEEL SPURS LEFT     FOOT, RIGHT SHOULDER REPAIR), Other Surgeries; No: AAA Repair, Abdominal     Surgery, Adenoids, Angioplasty, Appendectomy, Back Surgery, Bowel Surgery,     Breast Surgery, CABG, Carotid Stenosis, Cholecystectomy, Ear Surgery, Eye     Surgery, Hernia Surgery, Kidney Surgery, Nose Surgery, Oral Surgery,     Prostatectomy, Rectal Surgery, Spinal Surgery, Testicular Surgery, Throat     Surgery, Tonsils, Valve Replacement, Vascular Surgery      Stroke - Family Hx:  Mother (anerysm)      Heart - Family Hx:  Father      Diabetes - Family Hx:  Brother      Cancer/Type - Family Hx:  Father (lung)      Is Father Still Living?:  No      Is Mother Still Living?:  No       Family History:  Yes      Social History:  No Tobacco Use, No Alcohol Use, No Recreational Drug use      Smoking status:  Former smoker (1 ppd x 20 y quit 4/16 started back 06/2016 quit    10/2019)      Anticoagulation Therapy:  No      Antibiotic Prophylaxis:  No      Medical History:  Yes: Arthritis (BILATERAL WRISTS), Asthma (AS A CHILD), Blood     Disease (POLYCYTHEMIA), Chronic Bronchitis/COPD, Hemorrhoids/Rectal Prob     (REFLUX), High Blood Pressure, Shortness Of Breath (HX OF PNEUMONIA 03/11),     Thyroid Problem, Miscellaneous Medical/oth (ulcers); No: Alcoholism, Allergies,     Anemia, Atrial Fibrillation, Broken Bones, Cataracts, Chemical Dependency, Ch    emotherapy/Cancer, Emphysema, Chronic Liver Disease, Colon Trouble, Colitis,     Diverticulitis, Congestive Heart Failu, Deafness or Ringing Ears, Convulsions,     Depression, Anxiety, Bipolar Disorder, PTSD, Diabetes, Epilepsy, Seizures,     Forgetfullness, Glaucoma, Gall Stones, Gout, Head Injury, Heart Attack, Heart     Murmur, GERD, Hepatitis, Hiatal Hernia, High Cholesterol, HIV (Do not ask -     volu, Jaundice, Kidney or Bladder Disease,  Kidney Stones, Migrane Headaches,     Mitral Valve Prolapse, Night sweats, Phlebitis, Psychiatric Care, Reflux     Disease, Rheumatic Fever, Sexually Transmitted Dis, Sinus Trouble, Skin     Disease/Psoriais/Ecz, Stroke, Tuberculosis or Pos TB Te      Psychiatric History      NONE            PREVENTION      Hx Influenza Vaccination:  No      Influenza Vaccine Declined:  Yes      2 or More Falls Past Year?:  No      Fall Past Year with Injury?:  No      Hx Pneumococcal Vaccination:  No      Encouraged to follow-up with:  PCP regarding preventative exams.      Chart initiated by      MELANIE MAGALLANES/ MA            ALLERGIES/MEDICATIONS      Allergies:        Coded Allergies:             NO KNOWN ALLERGIES (Unverified , 2/5/20)      Medications    Last Reconciled on 2/5/20 09:17 by MAHAD MORENO MD MDI-Albuterol (Ventolin HFA) 8 Gm Hfa.aer.ad      1 PUFFS INH Q4-6H PRN for SHORTNESS OF BREATH, #1 MDI 6 Refills         Prov: Corin Webb PA-C         3/8/19       Umeclidinium/Vilanterol 62.5-25 Mcg Inh (Anoro Ellipta 62.5-25 Mcg Inh) 1 Each     Blst.w.dev      1 PUFF INH QDAY, #1 INH 5 Refills         Prov: Corin Webb PA-C         3/8/19       Atenolol/Chlorthalidone 100/25 MG (Tenoretic 100) 1 Tab Tablet      1 TAB PO QDAY, TAB         Reported         5/6/16       Levothyroxine (Synthroid) 0.025 Mg Tablet      0.025 MG PO QDAY@07, #30 TAB 0 Refills         Reported         3/24/16       Cetirizine Hcl (CETIRIZINE HCL) 10 Mg Tablet      10 MG PO QDAY, #30 TAB 0 Refills         Reported         7/24/14       Rosuvastatin Calcium (Crestor*) 20 Mg Tablet      20 MG PO QDAY, TAB         Reported         7/24/14       amLODIPine (amLODIPine) 10 Mg Tablet      10 MG PO QDAY, #30 TAB 0 Refills         Reported         7/24/14       Lansoprazole (Lansoprazole) 15 Mg Capsule.      15 MG PO QD PRN, CAP         Reported         7/24/14      Current Medications      Current Medications Reviewed 2/5/20             EXAM      CONSTITUTIONAL: Pleasant  normal conversant.       EYES : Pink conjunctive, no ptosis, PERRL.       ENMT : Nose and ears appear normal, normal dentition, mild posterior pharyngeal     wall erythema. Mallampati classification       Neck: Nontender, no masses, no thyromegaly, no nodules.      Resp : Grossly clear to auscultation bilaterally without wheezes, rhonchi or     crackles appreciated.  Normal work of breathing noted.       CVS  : No carotid bruits, s1s2 nl, RRR, no murmur, rubs or gallop, no peripheral    edema       Chest wall: Normal rise with inspiration, nontender on palpation      GI   : Abdomen soft, with no masses, no hepatosplenomegaly, no hernias, BS+      MSK  : Normal gait and station, no digital cyanosis or clubbing       Skin : No rashes, ulcerations or lesions, normal turgor and temperature      Neuro: CN II - XII intact, no sensory deficits, DTRs intact and symmetrical, no     motor weakness      Psych: Appropriate affect, A   Vtials      Vitals:             Height 5 ft 10 in / 177.8 cm           Weight 200 lbs 0 oz / 90.91054 kg           BSA 2.09 m2           BMI 28.7 kg/m2           Temperature 98.1 F / 36.72 C - Oral           Pulse 58           Respirations 18           Blood Pressure 128/74 Sitting, Right Arm           Pulse Oximetry 96%, room air            REVIEW      Results Reviewed      PCCS Results Reviewed?:  Yes Prev Lab Results, Yes Prev Radiology Results, Yes     [facility]      Lab Results      The patient's CPAP usage data for the last month was reviewed. Over the last     month he has been using it almost everyday, he did not use it for a day. His     total average daily use is 6 hours and 50 minutes, mean auto-CPAP pressure is     5.7, peak pressure is 8.7, average apnea/hypopnea index is 5.      Radiographic Results               [facility]                PACS RADIOLOGY REPORT             Patient: YAHIR SILVA   Acct: #[id number]   Report: #[id number]            UNIT #: [id number]    DOS: 20 1119      INSURANCE:[facility]   ORDER #:[id number]      LOCATION:CT     : 1960            PROVIDERS      ADMITTING:     ATTENDING: Sandip Thorpe      FAMILY:  MITCH SPRING   ORDERING:  Sandip Thorpe         OTHER:    DICTATING:  BRIEN HAYDEN MD            REQ #:[id number]   [facility]      REASON FOR EXAM:  CURRENT SMOKER      REASON FOR VISIT:  CURRENT SMOKER            *******Signed******         PROCEDURE:   CT LOW DOSE CHEST SCREENING             COMPARISON:   [facility], CT, CT LOW DOSE CHEST SCREENING,    2019, 9:42.             REASON FOR SCREENING:   Patient is 59 years of age, asymptomatic, and has a     smoking history of more       than 30 pack years.      SMOKING STATUS:   Former smoker.  Years since quitting: OCT 2019                SCREENING VISIT:   Year 1.                   TECHNIQUE:   Axial unenhanced LDCT images from the apices through mid-kidney     were obtained.       Evaluation of solid organs and vascular structures is suboptimal due to the lack    of IV contrast.       Imaging was performed on a Siemens Sensation 40 CT scanner.             RADIATION:   CT Dose Index Vol (CTDIvol):    3.05  mGy         Dose Length Product (DLP):    110  mGy-cm             DIAGNOSTIC QUALITY:   Satisfactory             FINDINGS:         No suspicious pulmonary nodule.  No adenopathy in the chest.  No acute findings     in the included       upper abdomen.  Bilateral gynecomastia.  No aggressive appearing bone change.             LUNG-RADS CLASSIFICATION:  Lung-RADS 1 - Negative.  No nodules or definitely     benign nodules.        Recommendation: Continue annual screening with LDCT in 12 months.  <1%     probability of malignancy.        Estimated population prevalence is 90%.  Reference: ACR Lung-RADS (Lung CT      Screening reporting and       data system) Version 1.1 assessment categories release date .               CONCLUSION:   Lung rads category 1-negative.  Per the ACR lung rads     recommendations, suggest patient       continue with yearly low-dose lung cancer screening.              BRIEN HAYDEN MD             Electronically Signed and Approved By: BRIEN HAYDEN MD on 2020 at 12:58                        Until signed, this is an unconfirmed preliminary report that may contain      errors and is subject to change.                                              DOWER:      D:20 1258      PFT Results      Patient: YAHIR MOCTEZUMA MAGY   Acct: #[id number]   Report: #[id number]            UNIT #: [id number]    DOS:       LOCATION:[facility]     : 1960            PROVIDERS      ADMITTING:     FAMILY:  MITCH SAW         OTHER:       DICTATING:  Sandip Thorpe MD            REASON FOR VISIT:  D75.1            *******Signed******                                    [facility]                          Health Information Management Services                            [place], [place]  [address]               __________________________________________________________________________             Patient Name:                   Attending Physician:      Yahir Moctezuma M.D.             Patient Visit # MR #            Admit Date  Disch Date     Location      [id number]    [id number]      2018                 [facility]- -             Date of Birth      1960      __________________________________________________________________________      0821 - DIAGNOSTIC REPORT             PULMONARY FUNCTION TEST             SPIROMETRY:      Spirometry is normal.      FEV1/FVC is 74%.      FEV1 is 3.35 L, 100% of predicted.      FVC is 4.53 L, 102% of predicted.             BRONCHODILATOR RESPONSE:      There is a borderline response to bronchodilator administration.       FEV1 increased from 3.35 L to 3.69 L, 10% change.      FVC increased from 4.53 L to 4.76 L, 5% change.             LUNG VOLUMES:      Lung volumes show air trapping.      Total lung capacity is 7.84 L, 116% of predicted.      Residual volume is 3.23 L, 150% of predicted.             DIFFUSION:      Diffusion capacity is mildly decreased, 73% of predicted.             FLOW VOLUME LOOP:      Flow volume loop is normal.             COMPARISON:      Compared to pulmonary function test from June 9, 2016:      Spirometry parameters show FEV1 decreased from 3.8 L to 3.35 L, 12% decrease.      Diffusion capacity has remained stable.      Total lung capacity has increased from 6.32 L to 7.84 L, 94% change.      Residual volume has increased from 1.41 L to 3.23 L, 129% change.             CONCLUSION:      Normal spirometry with air trapping and low diffusion capacity suggests mild      obstructive airway disease, possibly emphysema.  Underlying borderline      significant bronchial reversibility could suggest some reactive airway      disease component as well.  Please correlate clinically.             To be electronically signed in [facility]      [id number] SANDIP THORPE M.D.             NK:rt      D:  01/04/2019 12:51      T:  01/04/2019 15:49      #[id number]             Until signed, this is an unconfirmed preliminary report that may contain      errors and is subject to change.                   Until signed, this is an unconfirmed preliminary report that may contain      errors and is subject to change.                     <Electronically signed by Sandip Thorpe MD>                01/06/19 1937               Sandip Thorpe MD:RJT      D:01/04/19 1251            Assessment      Notes      Discontinued Medications      * CPAP Compressor (CPAP) 1 EACH EACH: EACH XX ONCE #1      * Umeclidinium/Vilanterol 62.5-25 Mcg Inh (Anoro Ellipta 62.5-25 Mcg Inh) 1 EACH       CATHYDEV          Sample - Qty 4         Instructions: 1 puff qd         Dx: COPD (chronic obstructive pulmonary disease) - J44.9      * MDI-Albuterol (Ventolin HFA) 8 GM HFA.AER.AD: 2 PUFFS INH Q4-6H PRN DYSPNEA #1      PLAN:      The patient is a 59 year old male with mild chronic obstructive pulmonary     disease and obstructive sleep apnea.             1.  Mild chronic obstructive pulmonary disease. Continue with Anoro daily if he     is going to use it, but he has been using it only as needed.  He also has     albuterol, but he does not use it.  I advised him to use albuterol when he is     short of breath.       2. He is up-to-date on Pneumovax, he will need Prevnar when he is 63.        3. He refuses flu shot.        4. He has quit smoking, I applauded his effort on quitting smoking.      5.  Obstructive sleep apnea.  Continue CPAP at current setting. He is currently     having  AutoPAP. His average daily use is 6 hours 50 minutes and apnea/hypopnea     index is 5.        6. Low dose lung cancer screening CT scan does not show any suspicious nodules.     He will need repeat low dose lung cancer screening CT scan in 01/2021.            Patient Education      Education resources provided:  Yes      Patient Education Provided:  Acute Bronchitis            Electronically signed by Sandip Thorpe  02/21/2020 15:54       Disclaimer: Converted document may not contain table formatting or lab diagrams. Please see [employer] for the authenticated document.

## 2021-06-05 NOTE — PROGRESS NOTES
Progress Note      Patient Name: Reinier Moctezuma   Patient ID: [id number]   Sex: Male   YOB: 1960    Primary Care Provider: Lasha Connell PA-C   Referring Provider: Lasha Connell PA-C    Visit Date: May 25, 2021    Provider: Lasha Connell PA-C   Location: [facility]   Location Address: [address] [place] [address], [address]  [place], [place]  [id number]   Location Phone: [phone]          Chief Complaint  · 6 month follow up      History Of Present Illness  Reinier Moctezuma is a 61 year old /White male who presents for evaluation and treatment of: 6 month follow up.      Pt presents today for a 6 month follow up.     Pt offers no complaints at this time.     Labs-12/2020  Colonoscopy-1/22/21    Pt refuses COVID-19 vaccine    HTN - well controlled - Norvasc, atenolon chlorthalidone  Hypothyroidism - controlled - synthroid 50mcg QD  GERD - controlled with prevacid 15mg QD  Hyperliipidemia - crestor QHS - controlled    No CP, SOA, HA         Past Medical History  Disease Name Date Onset Notes   Allergic rhinitis --  --    Allergic rhinitis due to allergen 03/01/2019 --    Arthritis --  --    Chronic Obstructive Pulmonary Disease --  --    Erectile dysfunction 10/16/2015 --    Erectile Dysfunction, Organic 01/20/2015 --    Essential hypertension 08/04/2017 --    GERD --  --    GERD (gastroesophageal reflux disease) 03/01/2019 --    Hyperlipemia --  --    Hyperlipidemia --  --    Hypertension --  --    Hypothyroidism 03/17/2016 --    Limb Swelling --  --    Nicotine dependence 08/04/2017 --    Peyronie's Disease --  --    Polycythemia secondary to smoking --  --    Prostatitis --  --    Reflux --  --    Reflux Disease --  --    Seasonal allergies --  --    Shortness of Breath --  --    Sinus Trouble; unspecified --  --    Tobacco use disorder 10/16/2015 --    Wrist sprain 07/08/2015 --          Past Surgical History  Procedure Name Date Notes   Colonoscopy 2008 --    Cyst Removal --  --     Cystoscopy --  --    Heel Spur --  --    nose 2011 Dr. Ash   Shoulder surgery --  --    Vasectomy --  --          Medication List  Name Date Started Instructions   amlodipine 10 mg oral tablet 04/13/2021 TAKE 1 TABLET BY MOUTH EVERY DAY   atenolol-chlorthalidone 100-25 mg oral tablet 04/13/2021 TAKE 1 TABLET BY MOUTH EVERY DAY   Crestor 40 mg oral tablet 03/03/2020 take 1 tablet (40 mg) by oral route once daily at bedtime for 90 days   Prevacid 15 mg oral capsule,delayed release(/EC) 03/03/2020 TAKE ONE CAPSULE BY MOUTH DAILY   Synthroid 50 mcg oral tablet 03/03/2020 TAKE ONE TABLET BY MOUTH DAILY   tadalafil 20 mg oral tablet 10/22/2020 take 1 tablet (20 mg) by oral route as needed approximately 1 hour before sexual activity   Tenoretic 100 100-25 mg oral tablet 03/03/2020 TAKE ONE TABLET BY MOUTH DAILY         Allergy List  Allergen Name Date Reaction Notes   NO KNOWN DRUG ALLERGIES --  --  --        Allergies Reconciled  Family Medical History  Disease Name Relative/Age Notes   Heart Disease Father/   Father   Cancer, Unspecified Brother/  Father/   Father; Brother   Diabetes, unspecified type Grandmother (maternal)/   grandparent   Renal Calculus Father/   --    Family history of colon cancer Brother/65   --    Family history of lung cancer Father/   --    Family history of liver cancer Brother/65   --    Family history of aneurysm Mother/   --          Social History  Finding Status Start/Stop Quantity Notes   Active but no formal exercise --  --/-- --  --    Alcohol Current every day --/-- 4-5 beers/day 02/05/2018 -    Alcohol Use Current some day --/-- --  drinks weekly, 2 drinks per day, has been drinking for 21-30 years   lives with spouse --  --/-- --  --     --  --/-- --  --     --  --/-- --  2 children   No known infection risk --  --/-- --  --    Recreational Drug Use Former --/-- --  in the past   Retired. --  --/-- --  --    Tobacco Former --/-- --  former smoker  Quit  "May 2016         Immunizations  NameDate Admin Mfg Trade Name Lot Number Route Inj VIS Given VIS Publication   Hepatitis A03/01/2019 MSD VAQTA-ADULT [id number] IM LD 03/01/2019 07/20/2016   Comments: pt tolerated well   Tdyimezmk97/22/2020 PMC Fluzone Quadrivalent [id number] IM LD 10/22/2020 08/15/2019   Comments: pt tolerated well         Review of Systems  · Constitutional  o Denies  o : fever, fatigue, weight loss, weight gain  · Cardiovascular  o Denies  o : lower extremity edema, claudication, chest pressure, palpitations  · Respiratory  o Denies  o : shortness of breath, wheezing, cough, hemoptysis, dyspnea on exertion  · Gastrointestinal  o Denies  o : nausea, vomiting, diarrhea, constipation, abdominal pain      Vitals  Date Time BP Position Site L\R Cuff Size HR RR TEMP (F) WT  HT  BMI kg/m2 BSA m2 O2 Sat FR L/min FiO2        05/25/2021 07:47 /67 Sitting    52 - R   200lbs 3.2oz 5'  10\" 28.73 2.12 97 %            Physical Examination  · Constitutional  o Appearance  o : well developed, well-nourished, no acute distress  · Head and Face  o Head  o : normocephalic, atraumatic  · Neck  o Inspection/Palpation  o : normal appearance, no masses or tenderness, trachea midline  o Thyroid  o : gland size normal, nontender, no nodules or masses present on palpation  · Respiratory  o Respiratory Effort  o : breathing unlabored  o Inspection of Chest  o : chest rise symmetric bilaterally  o Auscultation of Lungs  o : diminished breath sounds present    · Cardiovascular  o Heart  o :   § Auscultation of Heart  § : regular rate and rhythm, no murmurs, gallops or rubs  o Peripheral Vascular System  o :   § Extremities  § : no edema  · Lymphatic  o Neck  o : no cervical lymphadenopathy, no supraclavicular lymphadenopathy  · Psychiatric  o Mood and Affect  o : mood normal, affect appropriate          Assessment  · Essential " hypertension     401.9/I10  · Hyperlipidemia     272.4/E78.5  · Hypothyroidism     244.9/E03.9  · Vitamin D deficiency     268.9/E55.9  · Screening for depression     V79.0/Z13.89  · Allergic rhinitis     477.9/J30.9  · Chronic Obstructive Pulmonary Disease     491.21  · GERD     530.81  · Polycythemia secondary to smoking     289.0/D75.1      Plan  · Orders  o ACO-18: Negative screen for clinical depression using a standardized tool () - V79.0/Z13.89 - 05/25/2021  o Male Physical Primary Care Panel (CMP, CBC, TSH, Lipid, PSA) [facility] (, 35399, 02535, 48409, 85191, 30060) - - 10/25/2021  o Vitamin D (25-Hydroxy) Level (85988) - - 10/25/2021  o ACO-39: Current medications updated and reviewed (1159F, ) - - 05/25/2021  · Medications  o Crestor 40 mg oral tablet   SIG: take 1 tablet (40 mg) by oral route once daily at bedtime for 90 days   DISP: (90) Tablet with 3 refills  Refilled on 05/25/2021     o Medications have been Reconciled  o Transition of Care or Provider Policy  · Instructions  o Patient advised to monitor blood pressure (B/P) at home and journal readings. Patient informed that a B/P reading at home of more than 130/80 is considered hypertension. For readings greater ixpm165/90 or higher patient is advised to follow up in the office with readings for management. Patient advised to limit sodium intake.  o Patient was educated and given low cholesterol diet information.  o Recommended exercise program to assist with cholesterol, weight loss and overall health improvement.  o Advised that cheeses and other sources of dairy fats, animal fats, fast food, and the extras (candy, pastries, pies, doughnuts and cookies) all contain LDL raising nutrients. Advised to increase fruits, vegetables, whole grains, and to monitor portion sizes.   o Depression Screen completed and scanned into the EMR under the designated folder within the patient's documents.  o Today's PHQ-9 result is _1__  o Take all  medications as prescribed/directed.  o Patient instructed/educated on their diet and exercise program.  o Patient was educated/instructed on their diagnosis, treatment and medications prior to discharge from the clinic today.  o Discussed Covid-19 precautions including, but not limited to, social distancing, avoid touching your face, and hand washing.   · Disposition  o Call or Return if symptoms worsen or persist.  o F/U in 6 months  o Care Transition            Electronically Signed by: Lasha Connell PA-C -Author on May 25, 2021 08:23:44 AM

## 2021-07-15 VITALS
WEIGHT: 200.19 LBS | HEART RATE: 52 BPM | BODY MASS INDEX: 28.66 KG/M2 | DIASTOLIC BLOOD PRESSURE: 67 MMHG | SYSTOLIC BLOOD PRESSURE: 113 MMHG | HEIGHT: 70 IN | OXYGEN SATURATION: 97 %

## 2021-08-24 ENCOUNTER — OFFICE VISIT (OUTPATIENT)
Dept: ONCOLOGY | Facility: HOSPITAL | Age: 61
End: 2021-08-24

## 2021-08-24 ENCOUNTER — LAB (OUTPATIENT)
Dept: ONCOLOGY | Facility: HOSPITAL | Age: 61
End: 2021-08-24

## 2021-08-24 VITALS
DIASTOLIC BLOOD PRESSURE: 68 MMHG | TEMPERATURE: 97.6 F | HEART RATE: 55 BPM | BODY MASS INDEX: 28.56 KG/M2 | SYSTOLIC BLOOD PRESSURE: 119 MMHG | WEIGHT: 199.08 LBS | OXYGEN SATURATION: 98 % | RESPIRATION RATE: 16 BRPM

## 2021-08-24 DIAGNOSIS — D75.1 POLYCYTHEMIA SECONDARY TO SMOKING: ICD-10-CM

## 2021-08-24 DIAGNOSIS — M19.90 ARTHRITIS: ICD-10-CM

## 2021-08-24 PROBLEM — I10 ESSENTIAL HYPERTENSION: Status: ACTIVE | Noted: 2017-08-04

## 2021-08-24 PROBLEM — R06.02 SHORTNESS OF BREATH: Status: ACTIVE | Noted: 2021-08-24

## 2021-08-24 PROBLEM — K21.9 ACID REFLUX: Status: ACTIVE | Noted: 2021-08-24

## 2021-08-24 PROBLEM — F17.200 NICOTINE DEPENDENCE: Status: ACTIVE | Noted: 2017-08-04

## 2021-08-24 PROBLEM — J44.9 CHRONIC OBSTRUCTIVE PULMONARY DISEASE: Status: ACTIVE | Noted: 2021-08-24

## 2021-08-24 PROBLEM — E55.9 VITAMIN D DEFICIENCY: Status: ACTIVE | Noted: 2021-05-25

## 2021-08-24 PROBLEM — N48.6 PEYRONIE'S DISEASE: Status: ACTIVE | Noted: 2021-08-24

## 2021-08-24 PROBLEM — M79.89 LIMB SWELLING: Status: ACTIVE | Noted: 2021-08-24

## 2021-08-24 PROBLEM — J30.2 SEASONAL ALLERGIC RHINITIS: Status: ACTIVE | Noted: 2021-08-24

## 2021-08-24 PROBLEM — E78.5 HYPERLIPEMIA: Status: ACTIVE | Noted: 2021-08-24

## 2021-08-24 PROBLEM — N41.9 PROSTATITIS: Status: ACTIVE | Noted: 2021-08-24

## 2021-08-24 PROBLEM — J30.9 ALLERGIC RHINITIS DUE TO ALLERGEN: Status: ACTIVE | Noted: 2019-03-01

## 2021-08-24 LAB
ALBUMIN SERPL-MCNC: 4.43 G/DL (ref 3.5–5.2)
ALBUMIN/GLOB SERPL: 2 G/DL
ALP SERPL-CCNC: 71 U/L (ref 39–117)
ALT SERPL W P-5'-P-CCNC: 26 U/L (ref 1–41)
ANION GAP SERPL CALCULATED.3IONS-SCNC: 9.7 MMOL/L (ref 5–15)
AST SERPL-CCNC: 19 U/L (ref 1–40)
BASOPHILS # BLD AUTO: 0.07 10*3/MM3 (ref 0–0.2)
BASOPHILS NFR BLD AUTO: 1.4 % (ref 0–1.5)
BILIRUB SERPL-MCNC: 0.3 MG/DL (ref 0–1.2)
BUN SERPL-MCNC: 13 MG/DL (ref 8–23)
BUN/CREAT SERPL: 14.4 (ref 7–25)
CALCIUM SPEC-SCNC: 9 MG/DL (ref 8.6–10.5)
CHLORIDE SERPL-SCNC: 102 MMOL/L (ref 98–107)
CO2 SERPL-SCNC: 23.3 MMOL/L (ref 22–29)
CREAT SERPL-MCNC: 0.9 MG/DL (ref 0.76–1.27)
DEPRECATED RDW RBC AUTO: 44.8 FL (ref 37–54)
EOSINOPHIL # BLD AUTO: 0.4 10*3/MM3 (ref 0–0.4)
EOSINOPHIL NFR BLD AUTO: 7.8 % (ref 0.3–6.2)
ERYTHROCYTE [DISTWIDTH] IN BLOOD BY AUTOMATED COUNT: 13.6 % (ref 12.3–15.4)
GFR SERPL CREATININE-BSD FRML MDRD: 85 ML/MIN/1.73
GLOBULIN UR ELPH-MCNC: 2.3 GM/DL
GLUCOSE SERPL-MCNC: 152 MG/DL (ref 65–99)
HCT VFR BLD AUTO: 47.7 % (ref 37.5–51)
HGB BLD-MCNC: 16.5 G/DL (ref 13–17.7)
IMM GRANULOCYTES # BLD AUTO: 0.02 10*3/MM3 (ref 0–0.05)
IMM GRANULOCYTES NFR BLD AUTO: 0.4 % (ref 0–0.5)
LYMPHOCYTES # BLD AUTO: 1.32 10*3/MM3 (ref 0.7–3.1)
LYMPHOCYTES NFR BLD AUTO: 25.7 % (ref 19.6–45.3)
MCH RBC QN AUTO: 30.7 PG (ref 26.6–33)
MCHC RBC AUTO-ENTMCNC: 34.6 G/DL (ref 31.5–35.7)
MCV RBC AUTO: 88.8 FL (ref 79–97)
MONOCYTES # BLD AUTO: 0.49 10*3/MM3 (ref 0.1–0.9)
MONOCYTES NFR BLD AUTO: 9.6 % (ref 5–12)
NEUTROPHILS NFR BLD AUTO: 2.83 10*3/MM3 (ref 1.7–7)
NEUTROPHILS NFR BLD AUTO: 55.1 % (ref 42.7–76)
PLATELET # BLD AUTO: 195 10*3/MM3 (ref 140–450)
PMV BLD AUTO: 10.5 FL (ref 6–12)
POTASSIUM SERPL-SCNC: 3.5 MMOL/L (ref 3.5–5.2)
PROT SERPL-MCNC: 6.7 G/DL (ref 6–8.5)
RBC # BLD AUTO: 5.37 10*6/MM3 (ref 4.14–5.8)
SODIUM SERPL-SCNC: 135 MMOL/L (ref 136–145)
WBC # BLD AUTO: 5.13 10*3/MM3 (ref 3.4–10.8)

## 2021-08-24 PROCEDURE — 99213 OFFICE O/P EST LOW 20 MIN: CPT | Performed by: INTERNAL MEDICINE

## 2021-08-24 PROCEDURE — 80053 COMPREHEN METABOLIC PANEL: CPT

## 2021-08-24 PROCEDURE — 85025 COMPLETE CBC W/AUTO DIFF WBC: CPT

## 2021-08-24 PROCEDURE — 36415 COLL VENOUS BLD VENIPUNCTURE: CPT

## 2021-08-24 PROCEDURE — G0463 HOSPITAL OUTPT CLINIC VISIT: HCPCS

## 2021-08-24 RX ORDER — ATENOLOL 100 MG/1
100 TABLET ORAL DAILY
COMMUNITY
End: 2021-11-22 | Stop reason: SDUPTHER

## 2021-08-24 RX ORDER — EZETIMIBE AND SIMVASTATIN 10; 40 MG/1; MG/1
TABLET ORAL
COMMUNITY
End: 2021-11-22 | Stop reason: SDUPTHER

## 2021-08-24 RX ORDER — AMLODIPINE BESYLATE AND ATORVASTATIN CALCIUM 10; 10 MG/1; MG/1
TABLET, FILM COATED ORAL
COMMUNITY
End: 2021-11-11 | Stop reason: SDUPTHER

## 2021-08-24 NOTE — PROGRESS NOTES
Reinier Moctezuma   : 1960     LOCATION: [facility] HEMATOLOGY & ONCOLOGY     Chief Complaint  polycythemia (-fu)    Referring Provider: EDWAR Rhodes  PCP: Sixto Connell PA    Oncology/Hematology History    No history exists.           Subjective        History of Present Illness   Mr. Moctezuma presents for 6 month follow up appointment to evaluate secondary polycythemia.      He quit smoking and has been using his bipap  machine regularly and has not needed a phlebotomy since     . He completed a low dose CT screening in 2020 which was benign.              He  has received phlebotomies every 2 months for hematocrit above 50%.as needed       He is not smoking and is more consistently using his CPAP          He denies any sob or CASTAÑEDA, denies any abdominal painmeghan and eren      Review of Systems   Constitutional: Positive for fatigue (6/10). Negative for appetite change, diaphoresis, fever, unexpected weight gain and unexpected weight loss.   HENT: Negative for hearing loss, sore throat and voice change.    Eyes: Negative for blurred vision, double vision, pain, redness and visual disturbance.   Respiratory: Negative for cough, shortness of breath and wheezing.    Cardiovascular: Negative for chest pain, palpitations and leg swelling.   Endocrine: Negative for cold intolerance, heat intolerance, polydipsia and polyuria.   Genitourinary: Negative for decreased urine volume, difficulty urinating, frequency and urinary incontinence.   Musculoskeletal: Negative for arthralgias, back pain, joint swelling and myalgias.   Skin: Negative for color change, rash, skin lesions and bruise.   Neurological: Negative for dizziness, seizures, numbness and headache.   Hematological: Negative for adenopathy. Does not bruise/bleed easily.   Psychiatric/Behavioral: Negative for depressed mood. The patient is not nervous/anxious.    All other systems reviewed and are negative.    Current Outpatient  Medications on File Prior to Visit   Medication Sig Dispense Refill   • amLODIPine (NORVASC) 10 MG tablet Take 1 tablet by mouth Daily. 1 tablet 0   • aspirin 81 MG oral suspension      • atenolol (TENORMIN) 100 MG tablet      • ezetimibe-simvastatin (Vytorin) 10-40 MG per tablet Vytorin 10-40 10-40 mg oral tablet take 1 tablet by oral route once daily   Suspended     • lansoprazole (Prevacid) 15 MG capsule Take 1 capsule by mouth Daily. 90 capsule 2   • levothyroxine (SYNTHROID, LEVOTHROID) 50 MCG tablet Take 1 tablet by mouth Daily. 90 tablet 1   • rosuvastatin (CRESTOR) 40 MG tablet Take 1 tablet by mouth every night at bedtime. 30 tablet 0   • amLODIPine-atorvastatin (CADUET) 10-10 MG per tablet      • atenolol-chlorthalidone (TENORETIC) 100-25 MG per tablet Take 1 tablet by mouth Daily. 90 tablet 0   • [DISCONTINUED] levothyroxine (SYNTHROID, LEVOTHROID) 50 MCG tablet take 1 tablet by mouth every day 30 tablet 6     No current facility-administered medications on file prior to visit.       No Known Allergies    Past Medical History:   Diagnosis Date   • Allergic rhinitis due to allergen 03/01/2019   • Arthritis    • COPD (chronic obstructive pulmonary disease) (CMS/[facility])    • Erectile dysfunction 10/16/2015   • Esophageal reflux disease    • Essential hypertension 08/04/2017   • GERD (gastroesophageal reflux disease) 03/01/2019   • HLD (hyperlipidemia)    • Hyperlipemia    • Hypothyroidism 03/17/2016   • Limb swelling    • Nicotine dependence 08/04/2017   • Peyronie's disease    • Polycythemia secondary to smoking    • Prostatitis    • Seasonal allergies    • Sinus trouble    • SOB (shortness of breath)    • Tobacco use disorder 10/16/2015   • Vitamin D deficiency 05/25/2021   • Wrist sprain 07/08/2015     Past Surgical History:   Procedure Laterality Date   • COLONOSCOPY  2008   • CYST REMOVAL     • CYSTOSCOPY     • HEEL SPUR SURGERY     • NOSE SURGERY  2011    DR ZAMBRANO   • SHOULDER SURGERY     • VASECTOMY        Social History     Socioeconomic History   • Marital status:      Spouse name: Not on file   • Number of children: Not on file   • Years of education: Not on file   • Highest education level: Not on file   Tobacco Use   • Smoking status: Former Smoker     Quit date: 2016     Years since quittin.3   Substance and Sexual Activity   • Alcohol use: Yes     Comment: 4-5 BEERS A DAY; DRINKING 21-30 YEARS   • Drug use: Not Currently     Family History   Problem Relation Age of Onset   • Aneurysm Mother    • Heart disease Father    • Cancer Father    • Nephrolithiasis Father    • Lung cancer Father    • Cancer Brother    • Colon cancer Brother 65   • Liver cancer Brother 65   • Diabetes Maternal Grandmother    • Diabetes Other      Immunization History   Administered Date(s) Administered   • Flu Vaccine Quad PF >18YRS 10/22/2020   • Hepatitis A 2019       Objective     /68   Pulse 55   Temp 97.6 °F (36.4 °C)   Resp 16   Wt 90.3 kg (199 lb 1.2 oz)   SpO2 98%   BMI 28.56 kg/m²          Physical Exam    deferred      No results found for: IRON, LABIRON, TRANSFERRIN, TIBC, FERRITIN, [id number], FOLATE  ECOG score: 0           PHQ-9 Total Score: 0         Result Review :   The following data was reviewed by: Reva Pedro MD on 2021:  Lab Results   Component Value Date    HGB 16.6 2021    HCT 47.7 2021    MCV 86.7 2021     2021    WBC 4.29 (L) 2021    NEUTROABS 2.40 2021    LYMPHSABS 0.98 (L) 2021    MONOSABS 0.50 2021    EOSABS 0.33 2021    BASOSABS 0.06 2021     Lab Results   Component Value Date    BUN 11 2021    CREATININE 0.83 2021     2021    K 3.8 2021     2021    CO2 22 2021    CALCIUM 8.8 2021    PROTEINTOT 6.6 2021    ALBUMIN 4.3 2021    BILITOT 0.40 2021    ALKPHOS 66 2021    AST 21 2021    ALT 27 2021                    Assessment and Plan      ASSESSMENT   secondary polycythemia  PLAN/RECOMMENDATIONS  Secondary polycythemia due to cig smoking and JEAN-PAUL   Pt has quit smoking and uses CPAP regularly   His  H/H has improved and he has not need phlebotomy for several months    Labs today   will determine if phlebotomy is needed after labs   f/u 6 months    Diagnoses and all orders for this visit:    1. Polycythemia secondary to smoking  -     CBC & Differential  -     Comprehensive Metabolic Panel  -     CBC & Differential; Future  -     Comprehensive Metabolic Panel; Future    I spent 20 minutes caring for Reinier on this date of service. This time includes time spent by me in the following activities: reviewing tests, ordering medications, tests, or procedures, documenting information in the medical record and independently interpreting results and communicating that information with the patient/family/caregiver      Patient was given instructions and counseling regarding his condition or for health maintenance advice. Please see specific information pulled into the AVS if appropriate.     Reva Pedro MD    8/24/2021

## 2021-11-03 NOTE — PROGRESS NOTES
Primary Care Provider  Sixto Connell PA     Referring Provider  No ref. provider found     Chief Complaint  COPD and Results (LDCT )    Subjective          History of Presenting Illness  Patient is a 61-year-old male, patient of Dr. Dover with a history of mild obstructive airway disease and obstructive sleep apnea who presents for follow-up visit today.  Patient states that since last visit his breathing is doing well.  Patient states he has Anoro inhaler at home however he does not use it daily.  Patient states he only uses it intermittently.  Patient states he also has albuterol inhaler to use as needed however it was thrown away and is needing a refill on medication.  Patient states has not had take any antibiotics or steroids since last visit denies any hospitalizations since last visit.  Patient states that he is wearing his CPAP machine every night however there is a recall on it and he is waiting to receive a new machine.  Patient states that CPAP machine does help him sleep.  Patient denies any morning headaches or excessive daytime sleepiness.  Patient denies fever, chills, night sweats, swollen glands in the head and neck, unintentional weight loss, hemoptysis, purulent sputum production, dysphagia, chest pain, palpitations, chest tightness, abdominal pain, nausea, vomiting, and diarrhea.  Patient also denies any myalgias, changes in sense of taste and/or smell, sore throat, any other coronavirus or flu-like symptoms.  Patient denies any leg swelling, orthopnea, paroxysmal nocturnal dyspnea.  Patient is able to perform activities of daily living.        Review of Systems   Constitutional: Negative for activity change, appetite change, chills, diaphoresis, fatigue, fever, unexpected weight gain and unexpected weight loss.        Negative for Insomnia   HENT: Negative for congestion (Nasal), mouth sores, nosebleeds, postnasal drip, sore throat, swollen glands and trouble swallowing.         Negative for  Thrush  Negative for Hoarseness  Negative for Allergies/Hay Fever  Negative for Recent Head injury  Negative for Ear Fullness  Negative for Nasal or Sinus pain  Negative for Dry lips  Negative for Nasal discharge   Respiratory: Negative for apnea, cough, chest tightness, shortness of breath and wheezing.         Negative for Hemoptysis  Negative for Pleuritic pain   Cardiovascular: Negative for chest pain, palpitations and leg swelling.        Negative for Claudication  Negative for Cyanosis  Negative for Dyspnea on exertion   Gastrointestinal: Negative for abdominal pain, diarrhea, nausea, vomiting and GERD.   Musculoskeletal: Negative for joint swelling and myalgias.        Negative for Joint pain  Negative for Joint stiffness   Skin: Negative for color change, dry skin, pallor and rash.   Neurological: Negative for syncope, weakness and headache.   Hematological: Negative for adenopathy. Does not bruise/bleed easily.        Family History   Problem Relation Age of Onset   • Aneurysm Mother    • Heart disease Father    • Cancer Father    • Nephrolithiasis Father    • Lung cancer Father    • Cancer Brother    • Colon cancer Brother 65   • Liver cancer Brother 65   • Diabetes Maternal Grandmother    • Diabetes Other         Social History     Socioeconomic History   • Marital status:    Tobacco Use   • Smoking status: Former Smoker     Quit date: 2016     Years since quittin.5   • Smokeless tobacco: Never Used   Vaping Use   • Vaping Use: Never used   Substance and Sexual Activity   • Alcohol use: Yes     Comment: 4-5 BEERS A DAY; DRINKING 21-30 YEARS   • Drug use: Not Currently   • Sexual activity: Defer        Past Medical History:   Diagnosis Date   • Allergic rhinitis due to allergen 2019   • Arthritis    • COPD (chronic obstructive pulmonary disease) ([facility])    • Erectile dysfunction 10/16/2015   • Esophageal reflux disease    • Essential hypertension 2017   • GERD (gastroesophageal  reflux disease) 03/01/2019   • HLD (hyperlipidemia)    • Hyperlipemia    • Hypothyroidism 03/17/2016   • Limb swelling    • Nicotine dependence 08/04/2017   • Peyronie's disease    • Polycythemia secondary to smoking    • Prostatitis    • Seasonal allergies    • Sinus trouble    • SOB (shortness of breath)    • Tobacco use disorder 10/16/2015   • Vitamin D deficiency 05/25/2021   • Wrist sprain 07/08/2015        Immunization History   Administered Date(s) Administered   • Flu Vaccine Quad PF >18YRS 10/22/2020   • Hepatitis A 03/01/2019       No Known Allergies       Current Outpatient Medications:   •  albuterol sulfate  (90 Base) MCG/ACT inhaler, Inhale 2 puffs Every 4 (Four) Hours As Needed for Wheezing or Shortness of Air for up to 90 days., Disp: 54 g, Rfl: 3  •  amLODIPine (NORVASC) 10 MG tablet, Take 1 tablet by mouth Daily., Disp: 90 tablet, Rfl: 0  •  aspirin 81 MG oral suspension, , Disp: , Rfl:   •  atenolol (TENORMIN) 100 MG tablet, , Disp: , Rfl:   •  ezetimibe-simvastatin (Vytorin) 10-40 MG per tablet, Vytorin 10-40 10-40 mg oral tablet take 1 tablet by oral route once daily   Suspended, Disp: , Rfl:   •  lansoprazole (Prevacid) 15 MG capsule, Take 1 capsule by mouth Daily., Disp: 90 capsule, Rfl: 2  •  levothyroxine (SYNTHROID, LEVOTHROID) 50 MCG tablet, Take 1 tablet by mouth Daily., Disp: 90 tablet, Rfl: 1  •  rosuvastatin (CRESTOR) 40 MG tablet, Take 1 tablet by mouth every night at bedtime., Disp: 30 tablet, Rfl: 0  •  umeclidinium-vilanterol (ANORO ELLIPTA) 62.5-25 MCG/INH aerosol powder  inhaler, Inhale 1 puff Daily., Disp: , Rfl:      Objective     Physical Exam  Vital Signs Reviewed  WDWN, Alert, NAD.    HEENT:  PERRL, EOMI.  OP, nares clear, no sinus tenderness  Neck:  Supple, no JVD, no thyromegaly.  Lymph: no axillary, cervical, supraclavicular lymphadenopathy noted bilaterally  Chest:  good aeration, clear to auscultation bilaterally, tympanic to percussion bilaterally, no work of  "breathing noted  CV: RRR, no MGR, pulses 2+, equal.  Abd:  Soft, NT, ND, + BS, no HSM  EXT:  no clubbing, no cyanosis, no edema, no joint tenderness  Neuro:  A&Ox3, CN grossly intact, no focal deficits.  Skin: No rashes or lesions noted.    Vital Signs:   /69   Pulse 72   Temp 98.6 °F (37 °C)   Resp 14   Ht 177.8 cm (70\")   Wt 90.7 kg (200 lb)   SpO2 96% Comment: room air  BMI 28.70 kg/m²         Result Review :   I have personally reviewed Dr. Thorpe's last office visit note.          Assessment and Plan      Assessment:  1.  Mild obstructive airway disease.  2.  Obstructive sleep apnea.  3. Tobacco abuse of cigarettes remission. Patient enrolled in lung cancer screening.      Plan:  1.  Patient to continue Anoro 1 puff once daily every day as prescribed.  2.  Continue albuterol inhaler as needed.  3.  Patient is due for repeat low-dose chest CT scan.  Order placed today.  4.  Flu vaccine given to patient in the office today.  Patient reports they are up-to-date with pneumonia vaccines.  Patient declines COVID-19 vaccination.  Discussed with patient the benefits of vaccination including decreased risk of severe illness, hospitalization and death related to COVID-19.  Patient verbalized understanding. Patient is advised to continue to follow CDC recommendations such as social distancing, wearing a mask, and washing hands for least 20 seconds.  5.  Patient to call the office, 911, or go to the ER with new or worsening symptoms.  6.  Follow-up in 6 months, sooner if needed.          Follow Up   Return in about 6 months (around 5/11/2022).  Patient was given instructions and counseling regarding his condition or for health maintenance advice. Please see specific information pulled into the AVS if appropriate.       "

## 2021-11-03 NOTE — PATIENT INSTRUCTIONS
Sleep Apnea  Sleep apnea affects breathing during sleep. It causes breathing to stop for a short time or to become shallow. It can also increase the risk of:  · Heart attack.  · Stroke.  · Being very overweight (obese).  · Diabetes.  · Heart failure.  · Irregular heartbeat.  The goal of treatment is to help you breathe normally again.  What are the causes?  There are three kinds of sleep apnea:  · Obstructive sleep apnea. This is caused by a blocked or collapsed airway.  · Central sleep apnea. This happens when the brain does not send the right signals to the muscles that control breathing.  · Mixed sleep apnea. This is a combination of obstructive and central sleep apnea.  The most common cause of this condition is a collapsed or blocked airway. This can happen if:  · Your throat muscles are too relaxed.  · Your tongue and tonsils are too large.  · You are overweight.  · Your airway is too small.  What increases the risk?  · Being overweight.  · Smoking.  · Having a small airway.  · Being older.  · Being male.  · Drinking alcohol.  · Taking medicines to calm yourself (sedatives or tranquilizers).  · Having family members with the condition.  What are the signs or symptoms?  · Trouble staying asleep.  · Being sleepy or tired during the day.  · Getting angry a lot.  · Loud snoring.  · Headaches in the morning.  · Not being able to focus your mind (concentrate).  · Forgetting things.  · Less interest in sex.  · Mood swings.  · Personality changes.  · Feelings of sadness (depression).  · Waking up a lot during the night to pee (urinate).  · Dry mouth.  · Sore throat.  How is this diagnosed?  · Your medical history.  · A physical exam.  · A test that is done when you are sleeping (sleep study). The test is most often done in a sleep lab but may also be done at home.  How is this treated?    · Sleeping on your side.  · Using a medicine to get rid of mucus in your nose (decongestant).  · Avoiding the use of alcohol,  medicines to help you relax, or certain pain medicines (narcotics).  · Losing weight, if needed.  · Changing your diet.  · Not smoking.  · Using a machine to open your airway while you sleep, such as:  ? An oral appliance. This is a mouthpiece that shifts your lower jaw forward.  ? A CPAP device. This device blows air through a mask when you breathe out (exhale).  ? An EPAP device. This has valves that you put in each nostril.  ? A BPAP device. This device blows air through a mask when you breathe in (inhale) and breathe out.  · Having surgery if other treatments do not work.  It is important to get treatment for sleep apnea. Without treatment, it can lead to:  · High blood pressure.  · Coronary artery disease.  · In men, not being able to have an erection (impotence).  · Reduced thinking ability.  Follow these instructions at home:  Lifestyle  · Make changes that your doctor recommends.  · Eat a healthy diet.  · Lose weight if needed.  · Avoid alcohol, medicines to help you relax, and some pain medicines.  · Do not use any products that contain nicotine or tobacco, such as cigarettes, e-cigarettes, and chewing tobacco. If you need help quitting, ask your doctor.  General instructions  · Take over-the-counter and prescription medicines only as told by your doctor.  · If you were given a machine to use while you sleep, use it only as told by your doctor.  · If you are having surgery, make sure to tell your doctor you have sleep apnea. You may need to bring your device with you.  · Keep all follow-up visits as told by your doctor. This is important.  Contact a doctor if:  · The machine that you were given to use during sleep bothers you or does not seem to be working.  · You do not get better.  · You get worse.  Get help right away if:  · Your chest hurts.  · You have trouble breathing in enough air.  · You have an uncomfortable feeling in your back, arms, or stomach.  · You have trouble talking.  · One side of your  body feels weak.  · A part of your face is hanging down.  These symptoms may be an emergency. Do not wait to see if the symptoms will go away. Get medical help right away. Call your local emergency services (911 in the [place] Do not drive yourself to the hospital.  Summary  · This condition affects breathing during sleep.  · The most common cause is a collapsed or blocked airway.  · The goal of treatment is to help you breathe normally while you sleep.  This information is not intended to replace advice given to you by your health care provider. Make sure you discuss any questions you have with your health care provider.  Document Revised: 10/04/2019 Document Reviewed: 08/13/2019  [employer] Patient Education © 2021 [employer]

## 2021-11-11 ENCOUNTER — OFFICE VISIT (OUTPATIENT)
Dept: PULMONOLOGY | Facility: CLINIC | Age: 61
End: 2021-11-11

## 2021-11-11 VITALS
HEIGHT: 70 IN | OXYGEN SATURATION: 96 % | TEMPERATURE: 98.6 F | RESPIRATION RATE: 14 BRPM | WEIGHT: 200 LBS | HEART RATE: 72 BPM | BODY MASS INDEX: 28.63 KG/M2 | DIASTOLIC BLOOD PRESSURE: 69 MMHG | SYSTOLIC BLOOD PRESSURE: 135 MMHG

## 2021-11-11 DIAGNOSIS — G47.33 OSA (OBSTRUCTIVE SLEEP APNEA): ICD-10-CM

## 2021-11-11 DIAGNOSIS — J44.9 CHRONIC OBSTRUCTIVE PULMONARY DISEASE, UNSPECIFIED COPD TYPE (HCC): Primary | ICD-10-CM

## 2021-11-11 DIAGNOSIS — Z72.0 TOBACCO ABUSE: ICD-10-CM

## 2021-11-11 DIAGNOSIS — F17.211 CIGARETTE NICOTINE DEPENDENCE IN REMISSION: ICD-10-CM

## 2021-11-11 PROCEDURE — 90471 IMMUNIZATION ADMIN: CPT | Performed by: NURSE PRACTITIONER

## 2021-11-11 PROCEDURE — 90686 IIV4 VACC NO PRSV 0.5 ML IM: CPT | Performed by: NURSE PRACTITIONER

## 2021-11-11 PROCEDURE — 99213 OFFICE O/P EST LOW 20 MIN: CPT | Performed by: NURSE PRACTITIONER

## 2021-11-11 RX ORDER — ALBUTEROL SULFATE 90 UG/1
2 AEROSOL, METERED RESPIRATORY (INHALATION) EVERY 4 HOURS PRN
COMMUNITY
End: 2021-11-11 | Stop reason: SDUPTHER

## 2021-11-11 RX ORDER — ALBUTEROL SULFATE 90 UG/1
2 AEROSOL, METERED RESPIRATORY (INHALATION) EVERY 4 HOURS PRN
Qty: 54 G | Refills: 3 | Status: SHIPPED | OUTPATIENT
Start: 2021-11-11 | End: 2022-05-11 | Stop reason: SDUPTHER

## 2021-11-17 DIAGNOSIS — I10 ESSENTIAL HYPERTENSION: Primary | ICD-10-CM

## 2021-11-18 RX ORDER — AMLODIPINE BESYLATE 10 MG/1
10 TABLET ORAL DAILY
Qty: 90 TABLET | Refills: 0 | Status: SHIPPED | OUTPATIENT
Start: 2021-11-18 | End: 2021-11-22 | Stop reason: SDUPTHER

## 2021-11-22 ENCOUNTER — OFFICE VISIT (OUTPATIENT)
Dept: FAMILY MEDICINE CLINIC | Facility: CLINIC | Age: 61
End: 2021-11-22

## 2021-11-22 VITALS
WEIGHT: 210.4 LBS | OXYGEN SATURATION: 97 % | DIASTOLIC BLOOD PRESSURE: 66 MMHG | SYSTOLIC BLOOD PRESSURE: 129 MMHG | HEART RATE: 64 BPM | BODY MASS INDEX: 30.12 KG/M2 | HEIGHT: 70 IN

## 2021-11-22 DIAGNOSIS — Z12.5 SCREENING FOR PROSTATE CANCER: ICD-10-CM

## 2021-11-22 DIAGNOSIS — E03.9 HYPOTHYROIDISM, UNSPECIFIED TYPE: Primary | Chronic | ICD-10-CM

## 2021-11-22 DIAGNOSIS — Z23 NEED FOR PNEUMOCOCCAL VACCINATION: ICD-10-CM

## 2021-11-22 DIAGNOSIS — E78.5 HYPERLIPIDEMIA, UNSPECIFIED HYPERLIPIDEMIA TYPE: Chronic | ICD-10-CM

## 2021-11-22 DIAGNOSIS — I10 ESSENTIAL HYPERTENSION: ICD-10-CM

## 2021-11-22 DIAGNOSIS — K21.9 GASTROESOPHAGEAL REFLUX DISEASE, UNSPECIFIED WHETHER ESOPHAGITIS PRESENT: Chronic | ICD-10-CM

## 2021-11-22 PROCEDURE — 99214 OFFICE O/P EST MOD 30 MIN: CPT | Performed by: PHYSICIAN ASSISTANT

## 2021-11-22 RX ORDER — ROSUVASTATIN CALCIUM 40 MG/1
40 TABLET, COATED ORAL
Qty: 90 TABLET | Refills: 1 | Status: SHIPPED | OUTPATIENT
Start: 2021-11-22 | End: 2021-11-22 | Stop reason: SDUPTHER

## 2021-11-22 RX ORDER — EZETIMIBE AND SIMVASTATIN 10; 40 MG/1; MG/1
1 TABLET ORAL NIGHTLY
Qty: 90 TABLET | Refills: 1 | Status: SHIPPED | OUTPATIENT
Start: 2021-11-22 | End: 2021-11-22

## 2021-11-22 RX ORDER — AMLODIPINE BESYLATE 10 MG/1
10 TABLET ORAL DAILY
Qty: 90 TABLET | Refills: 1 | Status: SHIPPED | OUTPATIENT
Start: 2021-11-22 | End: 2021-11-22 | Stop reason: SDUPTHER

## 2021-11-22 RX ORDER — LEVOTHYROXINE SODIUM 0.05 MG/1
50 TABLET ORAL DAILY
Qty: 90 TABLET | Refills: 1 | Status: SHIPPED | OUTPATIENT
Start: 2021-11-22 | End: 2022-05-24 | Stop reason: SDUPTHER

## 2021-11-22 RX ORDER — ROSUVASTATIN CALCIUM 40 MG/1
40 TABLET, COATED ORAL
Qty: 90 TABLET | Refills: 1 | Status: SHIPPED | OUTPATIENT
Start: 2021-11-22 | End: 2022-05-24 | Stop reason: SDUPTHER

## 2021-11-22 RX ORDER — ATENOLOL 100 MG/1
100 TABLET ORAL DAILY
Qty: 90 TABLET | Refills: 1 | Status: SHIPPED | OUTPATIENT
Start: 2021-11-22 | End: 2022-05-24 | Stop reason: SDUPTHER

## 2021-11-22 RX ORDER — AMLODIPINE BESYLATE 10 MG/1
10 TABLET ORAL DAILY
Qty: 90 TABLET | Refills: 1 | Status: SHIPPED | OUTPATIENT
Start: 2021-11-22 | End: 2022-05-24 | Stop reason: SDUPTHER

## 2021-11-22 RX ORDER — LEVOTHYROXINE SODIUM 0.05 MG/1
50 TABLET ORAL DAILY
Qty: 90 TABLET | Refills: 1 | Status: SHIPPED | OUTPATIENT
Start: 2021-11-22 | End: 2021-11-22 | Stop reason: SDUPTHER

## 2021-11-22 RX ORDER — ATENOLOL 100 MG/1
100 TABLET ORAL DAILY
Qty: 90 TABLET | Refills: 1 | Status: SHIPPED | OUTPATIENT
Start: 2021-11-22 | End: 2021-11-22 | Stop reason: SDUPTHER

## 2021-11-22 RX ORDER — LANSOPRAZOLE 15 MG/1
15 CAPSULE, DELAYED RELEASE ORAL DAILY
Qty: 90 CAPSULE | Refills: 2 | Status: SHIPPED | OUTPATIENT
Start: 2021-11-22 | End: 2022-05-24 | Stop reason: SDUPTHER

## 2021-11-22 RX ORDER — LANSOPRAZOLE 15 MG/1
15 CAPSULE, DELAYED RELEASE ORAL DAILY
Qty: 90 CAPSULE | Refills: 2 | Status: SHIPPED | OUTPATIENT
Start: 2021-11-22 | End: 2021-11-22 | Stop reason: SDUPTHER

## 2021-11-22 NOTE — PROGRESS NOTES
"Chief Complaint  Hypertension (6 month follow up), Hyperlipidemia, and Hypothyroidism    Subjective          Reinier Moctezuma presents to [facility]  History of Present Illness    Reinier Moctezuma is a 61 y.o. male who presents today for a 6 month follow up HTN, HLD, Hypothyroidism.     Pt offers no complaints at this time.     Labs-8/2021  Colonoscopy-1/2021    Pt refuses COVID-19 vaccine.       Current Outpatient Medications:   •  albuterol sulfate  (90 Base) MCG/ACT inhaler, Inhale 2 puffs Every 4 (Four) Hours As Needed for Wheezing or Shortness of Air for up to 90 days., Disp: 54 g, Rfl: 3  •  amLODIPine (NORVASC) 10 MG tablet, Take 1 tablet by mouth Daily., Disp: 90 tablet, Rfl: 1  •  aspirin 81 MG oral suspension, Take 81 mg by mouth Daily., Disp: , Rfl:   •  atenolol (TENORMIN) 100 MG tablet, Take 1 tablet by mouth Daily., Disp: 90 tablet, Rfl: 1  •  lansoprazole (Prevacid) 15 MG capsule, Take 1 capsule by mouth Daily., Disp: 90 capsule, Rfl: 2  •  levothyroxine (SYNTHROID, LEVOTHROID) 50 MCG tablet, Take 1 tablet by mouth Daily., Disp: 90 tablet, Rfl: 1  •  rosuvastatin (CRESTOR) 40 MG tablet, Take 1 tablet by mouth every night at bedtime., Disp: 90 tablet, Rfl: 1  •  umeclidinium-vilanterol (ANORO ELLIPTA) 62.5-25 MCG/INH aerosol powder  inhaler, Inhale 1 puff Daily., Disp: , Rfl:   No current facility-administered medications for this visit.    Objective      Vital Signs:   /66 (BP Location: Left arm)   Pulse 64   Ht 177.8 cm (70\")   Wt 95.4 kg (210 lb 6.4 oz)   SpO2 97%   BMI 30.19 kg/m²    Estimated body mass index is 30.19 kg/m² as calculated from the following:    Height as of this encounter: 177.8 cm (70\").    Weight as of this encounter: 95.4 kg (210 lb 6.4 oz).     Physical Exam  Vitals and nursing note reviewed.   Constitutional:       Appearance: Normal appearance.   HENT:      Head: Normocephalic and atraumatic.   Cardiovascular:      Rate and " Rhythm: Normal rate and regular rhythm.   Pulmonary:      Effort: Pulmonary effort is normal.      Breath sounds: Normal breath sounds.   Musculoskeletal:      Cervical back: Neck supple.   Neurological:      Mental Status: He is alert.   Psychiatric:         Mood and Affect: Mood normal.         Behavior: Behavior normal.        Result Review :     Common labs    Common Labsle 1/4/21 2/8/21 2/8/21 8/24/21 8/24/21     0902 [id number] [id number]   Glucose   127 (A)  152 (A)   BUN   11  13   Creatinine   0.83  0.90   eGFR Non African Am     85   Sodium   138  135 (A)   Potassium   3.8  3.5   Chloride   106  102   Calcium   8.8  9.0   Albumin   4.3  4.43   Total Bilirubin   0.40  0.3   Alkaline Phosphatase   66  71   AST (SGOT)   21  19   ALT (SGPT)   27  26   WBC  4.29 (A)  5.13    Hemoglobin 16.2 16.6  16.5    Hematocrit 47.1 47.7  47.7    Platelets  201  195    (A) Abnormal value                            Assessment and Plan      Diagnoses and all orders for this visit:    1. Hypothyroidism, unspecified type (Primary)  Comments:  Stable on synthroid 50mcg daily  Orders:  -     Discontinue: levothyroxine (SYNTHROID, LEVOTHROID) 50 MCG tablet; Take 1 tablet by mouth Daily.  Dispense: 90 tablet; Refill: 1  -     levothyroxine (SYNTHROID, LEVOTHROID) 50 MCG tablet; Take 1 tablet by mouth Daily.  Dispense: 90 tablet; Refill: 1  -     TSH Rfx On Abnormal To Free T4; Future    2. Essential hypertension  -     Discontinue: amLODIPine (NORVASC) 10 MG tablet; Take 1 tablet by mouth Daily.  Dispense: 90 tablet; Refill: 1  -     Discontinue: atenolol (TENORMIN) 100 MG tablet; Take 1 tablet by mouth Daily.  Dispense: 90 tablet; Refill: 1  -     Discontinue: ezetimibe-simvastatin (Vytorin) 10-40 MG per tablet; Take 1 tablet by mouth Every Night.  Dispense: 90 tablet; Refill: 1  -     amLODIPine (NORVASC) 10 MG tablet; Take 1 tablet by mouth Daily.  Dispense: 90 tablet; Refill: 1  -     atenolol (TENORMIN) 100 MG tablet; Take 1 tablet  by mouth Daily.  Dispense: 90 tablet; Refill: 1    3. Gastroesophageal reflux disease, unspecified whether esophagitis present  Comments:  Stable - on prevacid 30mg daily  Orders:  -     Discontinue: lansoprazole (Prevacid) 15 MG capsule; Take 1 capsule by mouth Daily.  Dispense: 90 capsule; Refill: 2  -     lansoprazole (Prevacid) 15 MG capsule; Take 1 capsule by mouth Daily.  Dispense: 90 capsule; Refill: 2    4. Hyperlipidemia, unspecified hyperlipidemia type  Comments:  stable on crestor 40mg nightly  Orders:  -     Discontinue: rosuvastatin (CRESTOR) 40 MG tablet; Take 1 tablet by mouth every night at bedtime.  Dispense: 90 tablet; Refill: 1  -     rosuvastatin (CRESTOR) 40 MG tablet; Take 1 tablet by mouth every night at bedtime.  Dispense: 90 tablet; Refill: 1  -     Lipid Panel; Future    5. Need for pneumococcal vaccination  -     pneumococcal polysaccharide 23-valent (PNEUMOVAX-23) vaccine 0.5 mL    6. Screening for prostate cancer  -     PSA Screen; Future      Follow Up     Return in about 6 months (around 5/22/2022).    I have reviewed information obtained and documented by others and I have confirmed the accuracy of this documented note.     Patient was given instructions and counseling regarding his condition or for health maintenance advice. Please see specific information pulled into the AVS if appropriate.     EDWAR Rhodes

## 2021-11-30 ENCOUNTER — HOSPITAL ENCOUNTER (OUTPATIENT)
Dept: CT IMAGING | Facility: HOSPITAL | Age: 61
Discharge: HOME | End: 2021-11-30
Admitting: NURSE PRACTITIONER

## 2021-11-30 DIAGNOSIS — Z72.0 TOBACCO ABUSE: ICD-10-CM

## 2021-11-30 DIAGNOSIS — F17.211 CIGARETTE NICOTINE DEPENDENCE IN REMISSION: ICD-10-CM

## 2021-11-30 PROCEDURE — 71271 CT THORAX LUNG CANCER SCR C-: CPT

## 2021-12-27 ENCOUNTER — TELEPHONE (OUTPATIENT)
Dept: FAMILY MEDICINE CLINIC | Facility: CLINIC | Age: 61
End: 2021-12-27

## 2021-12-27 NOTE — TELEPHONE ENCOUNTER
Message sent VIA My Chart   [Transvaginal Ultrasound] : transvaginal ultrasound [Not Visualized] : not visualized [FreeTextEntry3] : 330cc uterus...last year 424cc\par fibroid is 165cc....last year 189cc [FreeTextEntry5] : smaller: 330cc, lining 7.8mm, ...fibroid 165cc

## 2021-12-28 ENCOUNTER — LAB (OUTPATIENT)
Dept: LAB | Facility: HOSPITAL | Age: 61
End: 2021-12-28

## 2021-12-28 DIAGNOSIS — E03.9 HYPOTHYROIDISM, UNSPECIFIED TYPE: Chronic | ICD-10-CM

## 2021-12-28 DIAGNOSIS — Z12.5 SCREENING FOR PROSTATE CANCER: ICD-10-CM

## 2021-12-28 DIAGNOSIS — E78.5 HYPERLIPIDEMIA, UNSPECIFIED HYPERLIPIDEMIA TYPE: Chronic | ICD-10-CM

## 2021-12-28 LAB
CHOLEST SERPL-MCNC: 144 MG/DL (ref 0–200)
HDLC SERPL-MCNC: 28 MG/DL (ref 40–60)
LDLC SERPL CALC-MCNC: 81 MG/DL (ref 0–100)
LDLC/HDLC SERPL: 2.67 {RATIO}
PSA SERPL-MCNC: 0.96 NG/ML (ref 0–4)
TRIGL SERPL-MCNC: 206 MG/DL (ref 0–150)
TSH SERPL DL<=0.05 MIU/L-ACNC: 3.81 UIU/ML (ref 0.27–4.2)
VLDLC SERPL-MCNC: 35 MG/DL (ref 5–40)

## 2021-12-28 PROCEDURE — 84443 ASSAY THYROID STIM HORMONE: CPT

## 2021-12-28 PROCEDURE — 80061 LIPID PANEL: CPT

## 2021-12-28 PROCEDURE — G0103 PSA SCREENING: HCPCS

## 2021-12-28 PROCEDURE — 36415 COLL VENOUS BLD VENIPUNCTURE: CPT

## 2022-01-03 ENCOUNTER — OFFICE VISIT (OUTPATIENT)
Dept: CARDIOLOGY | Facility: CLINIC | Age: 62
End: 2022-01-03

## 2022-01-03 VITALS
HEIGHT: 70 IN | SYSTOLIC BLOOD PRESSURE: 115 MMHG | DIASTOLIC BLOOD PRESSURE: 71 MMHG | HEART RATE: 56 BPM | BODY MASS INDEX: 29.84 KG/M2 | WEIGHT: 208.4 LBS

## 2022-01-03 DIAGNOSIS — I10 ESSENTIAL HYPERTENSION: ICD-10-CM

## 2022-01-03 DIAGNOSIS — I25.84 CORONARY ARTERY CALCIFICATION: Primary | ICD-10-CM

## 2022-01-03 DIAGNOSIS — E78.2 MIXED HYPERLIPIDEMIA: ICD-10-CM

## 2022-01-03 DIAGNOSIS — I25.10 CORONARY ARTERY CALCIFICATION: Primary | ICD-10-CM

## 2022-01-03 PROCEDURE — 99214 OFFICE O/P EST MOD 30 MIN: CPT | Performed by: INTERNAL MEDICINE

## 2022-01-03 RX ORDER — EZETIMIBE 10 MG/1
10 TABLET ORAL DAILY
Qty: 90 TABLET | Refills: 3 | Status: SHIPPED | OUTPATIENT
Start: 2022-01-03 | End: 2022-11-30 | Stop reason: SDUPTHER

## 2022-01-03 NOTE — ASSESSMENT & PLAN NOTE
SPECT study is negative for reversible ischemia and LV function is preserved.  He is asymptomatic with no chest pain.  Recommend to continue aspirin and statins.

## 2022-01-03 NOTE — PROGRESS NOTES
CARDIOLOGY FOLLOW-UP PROGRESS NOTE        Chief Complaint  Coronary artery calcification (Follow-up), Hyperlipidemia, and Hypertension    Subjective            Reinier Moctezuma presents to [facility]  History of Present Illness    Mr. Moctezuma is here for annual follow-up visit.  He was previously seen and evaluated for coronary artery calcification detected on a CT scan done for lung cancer screening.  Since then patient underwent SPECT stress test, which was negative for ischemia.  Echocardiogram showed normal LV function as well.  Today patient denies having any chest pain or palpitations.  He does have shortness of breath related to COPD.  He is fairly active at baseline.      Past History:    1) Hypertension; 2) Hypothyroidism; 3) Hyperlipidemia; 4) COPD, followed by Dr. Mcnulty; 5) coronary artery calcification    Medical History:  Past Medical History:   Diagnosis Date   • Allergic rhinitis due to allergen 03/01/2019   • Arthritis    • COPD (chronic obstructive pulmonary disease) (HCC)    • Erectile dysfunction 10/16/2015   • Esophageal reflux disease    • Essential hypertension 08/04/2017   • GERD (gastroesophageal reflux disease) 03/01/2019   • HLD (hyperlipidemia)    • Hypothyroidism 03/17/2016   • Limb swelling    • Nicotine dependence 08/04/2017   • Peyronie's disease    • Polycythemia secondary to smoking    • Prostatitis    • Seasonal allergies    • Sinus trouble    • SOB (shortness of breath)    • Tobacco use disorder 10/16/2015   • Vitamin D deficiency 05/25/2021   • Wrist sprain 07/08/2015       Surgical History: has a past surgical history that includes Colonoscopy (2008); Cyst Removal; Cystoscopy; Heel spur surgery; Nose surgery (2011); Shoulder surgery; and Vasectomy.     Family History: family history includes Aneurysm in his mother; Cancer in his brother and father; Colon cancer (age of onset: 65) in his brother; Diabetes in his maternal grandmother and another family  "member; Heart disease in his father; Liver cancer (age of onset: 65) in his brother; Lung cancer in his father; Nephrolithiasis in his father.     Social History: reports that he quit smoking about 5 years ago. He has a 45.00 pack-year smoking history. He has never used smokeless tobacco. He reports current alcohol use of about 10.0 standard drinks of alcohol per week. He reports previous drug use.    Allergies: Patient has no known allergies.    Current Outpatient Medications on File Prior to Visit   Medication Sig   • albuterol sulfate  (90 Base) MCG/ACT inhaler Inhale 2 puffs Every 4 (Four) Hours As Needed for Wheezing or Shortness of Air for up to 90 days.   • amLODIPine (NORVASC) 10 MG tablet Take 1 tablet by mouth Daily.   • aspirin 81 MG oral suspension Take 81 mg by mouth Daily.   • atenolol (TENORMIN) 100 MG tablet Take 1 tablet by mouth Daily.   • lansoprazole (Prevacid) 15 MG capsule Take 1 capsule by mouth Daily.   • levothyroxine (SYNTHROID, LEVOTHROID) 50 MCG tablet Take 1 tablet by mouth Daily.   • rosuvastatin (CRESTOR) 40 MG tablet Take 1 tablet by mouth every night at bedtime.   • umeclidinium-vilanterol (ANORO ELLIPTA) 62.5-25 MCG/INH aerosol powder  inhaler Inhale 1 puff Daily.     No current facility-administered medications on file prior to visit.          Review of Systems   Respiratory: Positive for shortness of breath. Negative for cough and wheezing.    Cardiovascular: Negative for chest pain, palpitations and leg swelling.   Gastrointestinal: Negative for nausea and vomiting.   Neurological: Negative for dizziness and syncope.        Objective     /71 (BP Location: Right arm, Patient Position: Sitting, Cuff Size: Large Adult)   Pulse 56   Ht 177.8 cm (70\")   Wt 94.5 kg (208 lb 6.4 oz)   BMI 29.90 kg/m²       Physical Exam    General : Alert, awake, no acute distress  Neck : Supple, no carotid bruit, no jugular venous distention  CVS : Regular rate and rhythm, no murmur, " rubs or gallops  Lungs: Clear to auscultation bilaterally, no crackles or rhonchi  Abdomen: Soft, nontender, bowel sounds heard in all 4 quadrants  Extremities: Warm, well-perfused, no pedal edema    Result Review :     The following data was reviewed by: Yeison Ibarra MD on 01/03/2022:    CMP    CMP 2/8/21 8/24/21   Glucose 127 (A) 152 (A)   BUN 11 13   Creatinine 0.83 0.90   eGFR Non African Am  85   Sodium 138 135 (A)   Potassium 3.8 3.5   Chloride 106 102   Calcium 8.8 9.0   Albumin 4.3 4.43   Total Bilirubin 0.40 0.3   Alkaline Phosphatase 66 71   AST (SGOT) 21 19   ALT (SGPT) 27 26   (A) Abnormal value            CBC    CBC 2/8/21 8/24/21   WBC 4.29 (A) 5.13   RBC 5.50 5.37   Hemoglobin 16.6 16.5   Hematocrit 47.7 47.7   MCV 86.7 88.8   MCH 30.2 30.7   MCHC 34.8 34.6   RDW 13.9 13.6   Platelets 201 195   (A) Abnormal value            TSH    TSH 12/28/21   TSH 3.810           Lipid Panel    Lipid Panel 12/28/21   Total Cholesterol 144   Triglycerides 206 (A)   HDL Cholesterol 28 (A)   VLDL Cholesterol 35   LDL Cholesterol  81   LDL/HDL Ratio 2.67   (A) Abnormal value                 Data reviewed: Cardiology studies        Results for orders placed in visit on 07/06/21    Adult Transthoracic Echo Complete w/ Color, Spectral and Contrast if necessary per protocol    Interpretation Summary  · Normal left ventricle systolic function with a calculated LV ejection fraction of 64%  · Left ventricular diastolic function is consistent with (grade I) impaired relaxation.  · There are no hemodynamically significant valvular abnormalities.      SPECT stress test done on 12/10/2020 showed    1.  No evidence of reversible ischemia or myocardial infarction.  Small  apical perfusion defect noted with no reversibility, which is likely a  normal variant.    2.  Normal left ventricular systolic function with ejection fraction of 65%  with normal wall motion.    3.  No chest pain during regadenoson infusion.    4.  No  ischemic electrocardiogram changes noted.             Assessment and Plan        Diagnoses and all orders for this visit:    1. Coronary artery calcification (Primary)  Assessment & Plan:  SPECT study is negative for reversible ischemia and LV function is preserved.  He is asymptomatic with no chest pain.  Recommend to continue aspirin and statins.      2. Mixed hyperlipidemia  Assessment & Plan:  LDL is 84 which is above goal.  He has coronary calcification and hence atherosclerotic cardiovascular disease.  He is already on the maximum dose of rosuvastatin which will be continued.  We will add Zetia 10 mg p.o. daily.    Orders:  -     ezetimibe (ZETIA) 10 MG tablet; Take 1 tablet by mouth Daily.  Dispense: 90 tablet; Refill: 3    3. Essential hypertension  Assessment & Plan:  Blood pressure is very well controlled.  Continue current medications without changes.              Follow Up     Return in about 1 year (around 1/3/2023) for Next scheduled follow up.    Patient was given instructions and counseling regarding his condition or for health maintenance advice. Please see specific information pulled into the AVS if appropriate.

## 2022-01-03 NOTE — ASSESSMENT & PLAN NOTE
LDL is 84 which is above goal.  He has coronary calcification and hence atherosclerotic cardiovascular disease.  He is already on the maximum dose of rosuvastatin which will be continued.  We will add Zetia 10 mg p.o. daily.

## 2022-01-06 ENCOUNTER — APPOINTMENT (OUTPATIENT)
Dept: CT IMAGING | Facility: HOSPITAL | Age: 62
End: 2022-01-06

## 2022-01-06 ENCOUNTER — HOSPITAL ENCOUNTER (EMERGENCY)
Facility: HOSPITAL | Age: 62
Discharge: HOME | End: 2022-01-07
Attending: EMERGENCY MEDICINE | Admitting: EMERGENCY MEDICINE

## 2022-01-06 DIAGNOSIS — S39.012A STRAIN OF LUMBAR REGION, INITIAL ENCOUNTER: ICD-10-CM

## 2022-01-06 DIAGNOSIS — S16.1XXA CERVICAL STRAIN, ACUTE, INITIAL ENCOUNTER: Primary | ICD-10-CM

## 2022-01-06 DIAGNOSIS — Z04.1 ENCOUNTER FOR EXAMINATION FOLLOWING MOTOR VEHICLE COLLISION: ICD-10-CM

## 2022-01-06 LAB
ALBUMIN SERPL-MCNC: 4.7 G/DL (ref 3.5–5.2)
ALBUMIN/GLOB SERPL: 2 G/DL
ALP SERPL-CCNC: 66 U/L (ref 39–117)
ALT SERPL W P-5'-P-CCNC: 36 U/L (ref 1–41)
ANION GAP SERPL CALCULATED.3IONS-SCNC: 12.3 MMOL/L (ref 5–15)
AST SERPL-CCNC: 25 U/L (ref 1–40)
BASOPHILS # BLD AUTO: 0.08 10*3/MM3 (ref 0–0.2)
BASOPHILS NFR BLD AUTO: 0.8 % (ref 0–1.5)
BILIRUB SERPL-MCNC: 0.5 MG/DL (ref 0–1.2)
BUN SERPL-MCNC: 18 MG/DL (ref 8–23)
BUN/CREAT SERPL: 19.1 (ref 7–25)
CALCIUM SPEC-SCNC: 9.5 MG/DL (ref 8.6–10.5)
CHLORIDE SERPL-SCNC: 103 MMOL/L (ref 98–107)
CO2 SERPL-SCNC: 22.7 MMOL/L (ref 22–29)
CREAT SERPL-MCNC: 0.94 MG/DL (ref 0.76–1.27)
DEPRECATED RDW RBC AUTO: 41.5 FL (ref 37–54)
EOSINOPHIL # BLD AUTO: 0.37 10*3/MM3 (ref 0–0.4)
EOSINOPHIL NFR BLD AUTO: 3.8 % (ref 0.3–6.2)
ERYTHROCYTE [DISTWIDTH] IN BLOOD BY AUTOMATED COUNT: 13.3 % (ref 12.3–15.4)
GFR SERPL CREATININE-BSD FRML MDRD: 81 ML/MIN/1.73
GLOBULIN UR ELPH-MCNC: 2.4 GM/DL
GLUCOSE SERPL-MCNC: 139 MG/DL (ref 65–99)
HCT VFR BLD AUTO: 48.7 % (ref 37.5–51)
HGB BLD-MCNC: 17.6 G/DL (ref 13–17.7)
IMM GRANULOCYTES # BLD AUTO: 0.03 10*3/MM3 (ref 0–0.05)
IMM GRANULOCYTES NFR BLD AUTO: 0.3 % (ref 0–0.5)
LIPASE SERPL-CCNC: 20 U/L (ref 13–60)
LYMPHOCYTES # BLD AUTO: 1.62 10*3/MM3 (ref 0.7–3.1)
LYMPHOCYTES NFR BLD AUTO: 16.8 % (ref 19.6–45.3)
MCH RBC QN AUTO: 31.2 PG (ref 26.6–33)
MCHC RBC AUTO-ENTMCNC: 36.1 G/DL (ref 31.5–35.7)
MCV RBC AUTO: 86.3 FL (ref 79–97)
MONOCYTES # BLD AUTO: 0.82 10*3/MM3 (ref 0.1–0.9)
MONOCYTES NFR BLD AUTO: 8.5 % (ref 5–12)
NEUTROPHILS NFR BLD AUTO: 6.72 10*3/MM3 (ref 1.7–7)
NEUTROPHILS NFR BLD AUTO: 69.8 % (ref 42.7–76)
NRBC BLD AUTO-RTO: 0 /100 WBC (ref 0–0.2)
PLATELET # BLD AUTO: 225 10*3/MM3 (ref 140–450)
PMV BLD AUTO: 10.5 FL (ref 6–12)
POTASSIUM SERPL-SCNC: 3.2 MMOL/L (ref 3.5–5.2)
PROT SERPL-MCNC: 7.1 G/DL (ref 6–8.5)
RBC # BLD AUTO: 5.64 10*6/MM3 (ref 4.14–5.8)
SODIUM SERPL-SCNC: 138 MMOL/L (ref 136–145)
WBC NRBC COR # BLD: 9.64 10*3/MM3 (ref 3.4–10.8)

## 2022-01-06 PROCEDURE — 71260 CT THORAX DX C+: CPT

## 2022-01-06 PROCEDURE — 96374 THER/PROPH/DIAG INJ IV PUSH: CPT

## 2022-01-06 PROCEDURE — 25010000002 KETOROLAC TROMETHAMINE PER 15 MG: Performed by: EMERGENCY MEDICINE

## 2022-01-06 PROCEDURE — 74177 CT ABD & PELVIS W/CONTRAST: CPT

## 2022-01-06 PROCEDURE — 36415 COLL VENOUS BLD VENIPUNCTURE: CPT

## 2022-01-06 PROCEDURE — 72125 CT NECK SPINE W/O DYE: CPT

## 2022-01-06 PROCEDURE — 99283 EMERGENCY DEPT VISIT LOW MDM: CPT

## 2022-01-06 PROCEDURE — 83690 ASSAY OF LIPASE: CPT | Performed by: EMERGENCY MEDICINE

## 2022-01-06 PROCEDURE — 0 IOPAMIDOL PER 1 ML: Performed by: EMERGENCY MEDICINE

## 2022-01-06 PROCEDURE — 80053 COMPREHEN METABOLIC PANEL: CPT | Performed by: EMERGENCY MEDICINE

## 2022-01-06 PROCEDURE — 85025 COMPLETE CBC W/AUTO DIFF WBC: CPT | Performed by: EMERGENCY MEDICINE

## 2022-01-06 RX ORDER — NAPROXEN 500 MG/1
500 TABLET ORAL 2 TIMES DAILY PRN
Qty: 20 TABLET | Refills: 0 | Status: SHIPPED | OUTPATIENT
Start: 2022-01-06 | End: 2022-03-16 | Stop reason: SDUPTHER

## 2022-01-06 RX ORDER — CYCLOBENZAPRINE HCL 10 MG
10 TABLET ORAL 3 TIMES DAILY PRN
Qty: 21 TABLET | Refills: 0 | Status: SHIPPED | OUTPATIENT
Start: 2022-01-06 | End: 2022-05-24

## 2022-01-06 RX ORDER — SODIUM CHLORIDE 0.9 % (FLUSH) 0.9 %
10 SYRINGE (ML) INJECTION AS NEEDED
Status: DISCONTINUED | OUTPATIENT
Start: 2022-01-06 | End: 2022-01-07 | Stop reason: HOSPADM

## 2022-01-06 RX ORDER — KETOROLAC TROMETHAMINE 30 MG/ML
30 INJECTION, SOLUTION INTRAMUSCULAR; INTRAVENOUS ONCE
Status: COMPLETED | OUTPATIENT
Start: 2022-01-06 | End: 2022-01-06

## 2022-01-06 RX ADMIN — IOPAMIDOL 100 ML: 755 INJECTION, SOLUTION INTRAVENOUS at 22:51

## 2022-01-06 RX ADMIN — KETOROLAC TROMETHAMINE 30 MG: 30 INJECTION, SOLUTION INTRAMUSCULAR; INTRAVENOUS at 22:14

## 2022-01-07 VITALS
RESPIRATION RATE: 16 BRPM | OXYGEN SATURATION: 95 % | TEMPERATURE: 97.4 F | WEIGHT: 204.59 LBS | BODY MASS INDEX: 29.29 KG/M2 | HEIGHT: 70 IN | DIASTOLIC BLOOD PRESSURE: 77 MMHG | HEART RATE: 65 BPM | SYSTOLIC BLOOD PRESSURE: 117 MMHG

## 2022-01-07 NOTE — DISCHARGE INSTRUCTIONS
Avoid strenuous activities until better.  Apply cold compresses to affected areas.  Apply for 20 or 30 minutes 3-5 times per day as needed.  Take the anti-inflammatory pain medication muscle relaxer as needed for pain and stiffness.  Follow-up with family doctor as needed.  Return to the ER for any change or worsening symptoms or if any other signs or symptoms of concern.
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
## 2022-02-10 ENCOUNTER — TELEPHONE (OUTPATIENT)
Dept: FAMILY MEDICINE CLINIC | Facility: CLINIC | Age: 62
End: 2022-02-10

## 2022-02-10 NOTE — TELEPHONE ENCOUNTER
Caller: Reinier Moctezuma    Relationship: Self    Best call back number: [phone]    What test was performed: CATSCAN     When was the test performed: 01/06    Where was the test performed: [facility]     Additional notes: PATIENT WOULD LIKE TO GO OVER CATSCAN RESULTS WITH PCP AND WOULD LIKE TO DISCUSS WHAT HIS NEXT STEPS SHOULD BE

## 2022-02-11 DIAGNOSIS — Q25.40 ABNORMALITY OF ABDOMINAL AORTA: Primary | ICD-10-CM

## 2022-02-24 ENCOUNTER — APPOINTMENT (OUTPATIENT)
Dept: ONCOLOGY | Facility: HOSPITAL | Age: 62
End: 2022-02-24

## 2022-03-16 ENCOUNTER — HOSPITAL ENCOUNTER (OUTPATIENT)
Dept: ULTRASOUND IMAGING | Facility: HOSPITAL | Age: 62
Discharge: HOME | End: 2022-03-16
Admitting: PHYSICIAN ASSISTANT

## 2022-03-16 DIAGNOSIS — Q25.40 ABNORMALITY OF ABDOMINAL AORTA: Primary | ICD-10-CM

## 2022-03-16 DIAGNOSIS — Q25.40 ABNORMALITY OF ABDOMINAL AORTA: ICD-10-CM

## 2022-03-16 PROCEDURE — 76706 US ABDL AORTA SCREEN AAA: CPT

## 2022-03-21 ENCOUNTER — OFFICE VISIT (OUTPATIENT)
Dept: VASCULAR SURGERY | Facility: HOSPITAL | Age: 62
End: 2022-03-21

## 2022-03-21 VITALS
DIASTOLIC BLOOD PRESSURE: 66 MMHG | RESPIRATION RATE: 18 BRPM | TEMPERATURE: 98.5 F | OXYGEN SATURATION: 96 % | SYSTOLIC BLOOD PRESSURE: 102 MMHG | HEART RATE: 72 BPM

## 2022-03-21 DIAGNOSIS — I71.40 ABDOMINAL AORTIC ANEURYSM (AAA) WITHOUT RUPTURE: Primary | ICD-10-CM

## 2022-03-21 PROCEDURE — 99202 OFFICE O/P NEW SF 15 MIN: CPT | Performed by: SURGERY

## 2022-03-21 PROCEDURE — G0463 HOSPITAL OUTPT CLINIC VISIT: HCPCS | Performed by: SURGERY
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
## 2022-05-08 NOTE — PROGRESS NOTES
Primary Care Provider  Sixto Connell PA     Referring Provider  No ref. provider found     Chief Complaint  COPD    Subjective          History of Presenting Illness  Patient is a 62-year-old male, patient of Dr. Dover who presents for management of mild obstructive airway disease and obstructive sleep apnea who presents for follow-up visit today.  Patient states that since last visit his breathing is doing well.  Patient states he is only taking Anoro and albuterol sporadically.  Patient states that he is wearing his CPAP machine at night which helps him to sleep.  Patient denies any morning headaches or excessive daytime sleepiness. Patient denies fever, chills, night sweats, swollen glands in the head and neck, unintentional weight loss, hemoptysis, purulent sputum production, dysphagia, chest pain, palpitations, chest tightness, abdominal pain, nausea, vomiting, and diarrhea.  Patient also denies any myalgias, changes in sense of taste and/or smell, sore throat, any other coronavirus or flu-like symptoms.  Patient denies any leg swelling, orthopnea, paroxysmal nocturnal dyspnea.  Patient is able to perform activities of daily living.      Review of Systems   Constitutional: Negative for activity change, appetite change, chills, diaphoresis, fatigue, fever, unexpected weight gain and unexpected weight loss.        Negative for Insomnia   HENT: Negative for congestion (Nasal), mouth sores, nosebleeds, postnasal drip, sore throat, swollen glands and trouble swallowing.         Negative for Thrush  Negative for Hoarseness  Negative for Allergies/Hay Fever  Negative for Recent Head injury  Negative for Ear Fullness  Negative for Nasal or Sinus pain  Negative for Dry lips  Negative for Nasal discharge   Respiratory: Positive for shortness of breath (with heavy exertion). Negative for apnea, cough, chest tightness and wheezing.         Negative for Hemoptysis  Negative for Pleuritic pain   Cardiovascular: Negative for  chest pain, palpitations and leg swelling.        Negative for Claudication  Negative for Cyanosis  Negative for Dyspnea on exertion   Gastrointestinal: Negative for abdominal pain, diarrhea, nausea, vomiting and GERD.   Musculoskeletal: Negative for joint swelling and myalgias.        Negative for Joint pain  Negative for Joint stiffness   Skin: Negative for color change, dry skin, pallor and rash.   Neurological: Negative for syncope, weakness and headache.   Hematological: Negative for adenopathy. Does not bruise/bleed easily.        Family History   Problem Relation Age of Onset   • Aneurysm Mother    • Heart disease Father    • Cancer Father    • Nephrolithiasis Father    • Lung cancer Father    • Cancer Brother    • Colon cancer Brother 65   • Liver cancer Brother 65   • Diabetes Maternal Grandmother    • Diabetes Other         Social History     Socioeconomic History   • Marital status:    Tobacco Use   • Smoking status: Former Smoker     Packs/day: 1.00     Years: 30.00     Pack years: 30.00     Types: Cigars     Start date:      Quit date: 2016     Years since quittin.0   • Smokeless tobacco: Never Used   Vaping Use   • Vaping Use: Never used   Substance and Sexual Activity   • Alcohol use: Yes     Alcohol/week: 10.0 standard drinks     Types: 10 Cans of beer per week     Comment: 4-5 BEERS A DAY; DRINKING 21-30 YEARS   • Drug use: Never   • Sexual activity: Yes     Partners: Female        Past Medical History:   Diagnosis Date   • Allergic rhinitis due to allergen 2019   • Arthritis    • COPD (chronic obstructive pulmonary disease) (HCC)    • Erectile dysfunction 10/16/2015   • Esophageal reflux disease    • Essential hypertension 2017   • GERD (gastroesophageal reflux disease) 2019   • HLD (hyperlipidemia)    • Hypothyroidism 2016   • Limb swelling    • Nicotine dependence 2017   • Peyronie's disease    • Polycythemia secondary to smoking    • Prostatitis     • Seasonal allergies    • Sinus trouble    • SOB (shortness of breath)    • Tobacco use disorder 10/16/2015   • Vitamin D deficiency 05/25/2021   • Wrist sprain 07/08/2015        Immunization History   Administered Date(s) Administered   • FluLaval/Fluarix/Fluzone >6 11/11/2021   • Fluzone Split Quad (Multi-dose) 10/22/2020   • Hepatitis A 03/01/2019   • Pneumococcal Polysaccharide (PPSV23) 11/22/2021       No Known Allergies       Current Outpatient Medications:   •  albuterol sulfate  (90 Base) MCG/ACT inhaler, Inhale 2 puffs Every 4 (Four) Hours As Needed for Wheezing or Shortness of Air for up to 90 days., Disp: 54 g, Rfl: 3  •  amLODIPine (NORVASC) 10 MG tablet, Take 1 tablet by mouth Daily., Disp: 90 tablet, Rfl: 1  •  aspirin 81 MG oral suspension, Take 81 mg by mouth Daily., Disp: , Rfl:   •  atenolol (TENORMIN) 100 MG tablet, Take 1 tablet by mouth Daily., Disp: 90 tablet, Rfl: 1  •  cetirizine (zyrTEC) 10 MG tablet, Take 10 mg by mouth Daily., Disp: , Rfl:   •  cyclobenzaprine (FLEXERIL) 10 MG tablet, Take 1 tablet by mouth 3 (Three) Times a Day As Needed for Muscle Spasms., Disp: 21 tablet, Rfl: 0  •  ezetimibe (ZETIA) 10 MG tablet, Take 1 tablet by mouth Daily., Disp: 90 tablet, Rfl: 3  •  lansoprazole (Prevacid) 15 MG capsule, Take 1 capsule by mouth Daily., Disp: 90 capsule, Rfl: 2  •  levothyroxine (SYNTHROID, LEVOTHROID) 50 MCG tablet, Take 1 tablet by mouth Daily., Disp: 90 tablet, Rfl: 1  •  naproxen (NAPROSYN) 500 MG tablet, Take 1 tablet by mouth 2 (Two) Times a Day As Needed for Pain., Disp: 36 tablet, Rfl: 1  •  rosuvastatin (CRESTOR) 40 MG tablet, Take 1 tablet by mouth every night at bedtime., Disp: 90 tablet, Rfl: 1  •  umeclidinium-vilanterol (ANORO ELLIPTA) 62.5-25 MCG/INH aerosol powder  inhaler, Inhale 1 puff Daily for 30 days., Disp: 60 each, Rfl: 11     Objective     Physical Exam    Vital Signs:   WDWN, Alert, NAD.    HEENT:  PERRL, EOMI.  OP, nares clear, no sinus  "tenderness  Neck:  Supple, no JVD, no thyromegaly.  Lymph: no axillary, cervical, supraclavicular lymphadenopathy noted bilaterally  Chest:  good aeration, clear to auscultation bilaterally, tympanic to percussion bilaterally, no work of breathing noted  CV: RRR, no MGR, pulses 2+, equal.  Abd:  Soft, NT, ND, + BS, no HSM  EXT:  no clubbing, no cyanosis, no edema, no joint tenderness  Neuro:  A&Ox3, CN grossly intact, no focal deficits.  Skin: No rashes or lesions noted.    /76   Pulse 76   Resp 14   Ht 177.8 cm (70\")   Wt 90.7 kg (200 lb)   SpO2 96% Comment: room air  BMI 28.70 kg/m²         Result Review :   I have reviewed my last office visit note.         Assessment and Plan      Assessment:  1.  Mild obstructive airway disease.  2.  Obstructive sleep apnea.  3. Tobacco abuse of cigarettes remission. Patient enrolled in lung cancer screening.    Plan:  1.   Patient states he is only taking Anoro sporadically.  Patient is advised to take Anoro 1 puff once daily every day as prescribed.  Medication compliance discussed with patient in the office today.  Risks of not taking medications as prescribed discussed with the patient.  Patient verbalized understanding and compliance. Patient is advised to take all medications as prescribed.  2.  Continue albuterol inhaler as needed.  3.  Patient is due for repeat low-dose chest CT scan in November, 2022.  Order placed today.  4.  Continue CPAP at current settings at night and with naps and clean mask and tubing daily.  Will request copy of CPAP compliance report and notify patient if any changes need to be made.  5.    Vaccination status:  patient reports they are up-to-date with flu and pneumonia vaccines.  Patient declines COVID-19 vaccination.  Discussed with patient the benefits of vaccination including decreased risk of severe illness, hospitalization and death related to COVID-19.  Patient verbalized understanding and will consider getting vaccinated.  " Patient is advised to continue to follow CDC recommendations such as social distancing, wearing a mask, and washing hands for least 20 seconds.    6.  Smoking status: Patient is a former cigarette smoker.  Patient is enrolled in lung cancer screening.  Patient will be due for repeat low-dose chest CT scan in November, 2022.  Order placed today.  7.  Patient to call the office, 911, or go to the ER with new or worsening symptoms.  8.  Follow-up in 6 months, sooner if needed.          Follow Up   Return in about 7 months (around 11/28/2022).  Patient was given instructions and counseling regarding his condition or for health maintenance advice. Please see specific information pulled into the AVS if appropriate.

## 2022-05-09 ENCOUNTER — TELEPHONE (OUTPATIENT)
Dept: PULMONOLOGY | Facility: CLINIC | Age: 62
End: 2022-05-09

## 2022-05-11 ENCOUNTER — OFFICE VISIT (OUTPATIENT)
Dept: PULMONOLOGY | Facility: CLINIC | Age: 62
End: 2022-05-11

## 2022-05-11 VITALS
DIASTOLIC BLOOD PRESSURE: 76 MMHG | WEIGHT: 200 LBS | BODY MASS INDEX: 28.63 KG/M2 | HEIGHT: 70 IN | OXYGEN SATURATION: 96 % | RESPIRATION RATE: 14 BRPM | HEART RATE: 76 BPM | SYSTOLIC BLOOD PRESSURE: 116 MMHG

## 2022-05-11 DIAGNOSIS — F17.201 TOBACCO ABUSE, IN REMISSION: ICD-10-CM

## 2022-05-11 DIAGNOSIS — F17.211 CIGARETTE NICOTINE DEPENDENCE IN REMISSION: ICD-10-CM

## 2022-05-11 DIAGNOSIS — J44.9 OBSTRUCTIVE AIRWAY DISEASE: Primary | ICD-10-CM

## 2022-05-11 DIAGNOSIS — G47.33 OSA (OBSTRUCTIVE SLEEP APNEA): ICD-10-CM

## 2022-05-11 PROCEDURE — 99213 OFFICE O/P EST LOW 20 MIN: CPT | Performed by: NURSE PRACTITIONER

## 2022-05-11 RX ORDER — CETIRIZINE HYDROCHLORIDE 10 MG/1
10 TABLET ORAL DAILY
COMMUNITY
End: 2022-11-30 | Stop reason: SDUPTHER

## 2022-05-11 RX ORDER — ALBUTEROL SULFATE 90 UG/1
2 AEROSOL, METERED RESPIRATORY (INHALATION) EVERY 4 HOURS PRN
Qty: 54 G | Refills: 3 | Status: SHIPPED | OUTPATIENT
Start: 2022-05-11 | End: 2023-01-09

## 2022-05-24 ENCOUNTER — OFFICE VISIT (OUTPATIENT)
Dept: FAMILY MEDICINE CLINIC | Facility: CLINIC | Age: 62
End: 2022-05-24

## 2022-05-24 ENCOUNTER — TELEPHONE (OUTPATIENT)
Dept: FAMILY MEDICINE CLINIC | Facility: CLINIC | Age: 62
End: 2022-05-24

## 2022-05-24 ENCOUNTER — LAB (OUTPATIENT)
Dept: LAB | Facility: HOSPITAL | Age: 62
End: 2022-05-24

## 2022-05-24 VITALS
HEIGHT: 70 IN | SYSTOLIC BLOOD PRESSURE: 139 MMHG | WEIGHT: 201 LBS | BODY MASS INDEX: 28.77 KG/M2 | DIASTOLIC BLOOD PRESSURE: 71 MMHG | HEART RATE: 64 BPM | OXYGEN SATURATION: 97 %

## 2022-05-24 DIAGNOSIS — N52.9 ERECTILE DYSFUNCTION, UNSPECIFIED ERECTILE DYSFUNCTION TYPE: ICD-10-CM

## 2022-05-24 DIAGNOSIS — I10 ESSENTIAL HYPERTENSION: Primary | ICD-10-CM

## 2022-05-24 DIAGNOSIS — E03.9 HYPOTHYROIDISM, UNSPECIFIED TYPE: Chronic | ICD-10-CM

## 2022-05-24 DIAGNOSIS — D75.1 POLYCYTHEMIA SECONDARY TO SMOKING: ICD-10-CM

## 2022-05-24 DIAGNOSIS — E78.5 HYPERLIPIDEMIA, UNSPECIFIED HYPERLIPIDEMIA TYPE: ICD-10-CM

## 2022-05-24 DIAGNOSIS — R73.01 IFG (IMPAIRED FASTING GLUCOSE): Primary | ICD-10-CM

## 2022-05-24 DIAGNOSIS — R73.01 IFG (IMPAIRED FASTING GLUCOSE): ICD-10-CM

## 2022-05-24 DIAGNOSIS — E78.5 HYPERLIPIDEMIA, UNSPECIFIED HYPERLIPIDEMIA TYPE: Chronic | ICD-10-CM

## 2022-05-24 DIAGNOSIS — K21.9 GASTROESOPHAGEAL REFLUX DISEASE, UNSPECIFIED WHETHER ESOPHAGITIS PRESENT: Chronic | ICD-10-CM

## 2022-05-24 DIAGNOSIS — E55.9 VITAMIN D DEFICIENCY: ICD-10-CM

## 2022-05-24 DIAGNOSIS — E03.9 HYPOTHYROIDISM, UNSPECIFIED TYPE: ICD-10-CM

## 2022-05-24 DIAGNOSIS — I10 ESSENTIAL HYPERTENSION: ICD-10-CM

## 2022-05-24 LAB
25(OH)D3 SERPL-MCNC: 21.5 NG/ML (ref 30–100)
ALBUMIN SERPL-MCNC: 4.5 G/DL (ref 3.5–5.2)
ALBUMIN/GLOB SERPL: 2 G/DL
ALP SERPL-CCNC: 66 U/L (ref 39–117)
ALT SERPL W P-5'-P-CCNC: 26 U/L (ref 1–41)
ANION GAP SERPL CALCULATED.3IONS-SCNC: 10.6 MMOL/L (ref 5–15)
AST SERPL-CCNC: 20 U/L (ref 1–40)
BASOPHILS # BLD AUTO: 0.09 10*3/MM3 (ref 0–0.2)
BASOPHILS NFR BLD AUTO: 1.4 % (ref 0–1.5)
BILIRUB SERPL-MCNC: 0.4 MG/DL (ref 0–1.2)
BILIRUB UR QL STRIP: NEGATIVE
BUN SERPL-MCNC: 10 MG/DL (ref 8–23)
BUN/CREAT SERPL: 10.2 (ref 7–25)
CALCIUM SPEC-SCNC: 9.3 MG/DL (ref 8.6–10.5)
CHLORIDE SERPL-SCNC: 105 MMOL/L (ref 98–107)
CHOLEST SERPL-MCNC: 128 MG/DL (ref 0–200)
CLARITY UR: CLEAR
CO2 SERPL-SCNC: 24.4 MMOL/L (ref 22–29)
COLOR UR: YELLOW
CREAT SERPL-MCNC: 0.98 MG/DL (ref 0.76–1.27)
DEPRECATED RDW RBC AUTO: 50.6 FL (ref 37–54)
EGFRCR SERPLBLD CKD-EPI 2021: 86.1 ML/MIN/1.73
EOSINOPHIL # BLD AUTO: 0.43 10*3/MM3 (ref 0–0.4)
EOSINOPHIL NFR BLD AUTO: 6.6 % (ref 0.3–6.2)
ERYTHROCYTE [DISTWIDTH] IN BLOOD BY AUTOMATED COUNT: 14.5 % (ref 12.3–15.4)
GLOBULIN UR ELPH-MCNC: 2.3 GM/DL
GLUCOSE SERPL-MCNC: 134 MG/DL (ref 65–99)
GLUCOSE UR STRIP-MCNC: NEGATIVE MG/DL
HCT VFR BLD AUTO: 50.9 % (ref 37.5–51)
HDLC SERPL-MCNC: 29 MG/DL (ref 40–60)
HGB BLD-MCNC: 17.4 G/DL (ref 13–17.7)
HGB UR QL STRIP.AUTO: NEGATIVE
IMM GRANULOCYTES # BLD AUTO: 0.02 10*3/MM3 (ref 0–0.05)
IMM GRANULOCYTES NFR BLD AUTO: 0.3 % (ref 0–0.5)
KETONES UR QL STRIP: ABNORMAL
LDLC SERPL CALC-MCNC: 67 MG/DL (ref 0–100)
LDLC/HDLC SERPL: 2.08 {RATIO}
LEUKOCYTE ESTERASE UR QL STRIP.AUTO: NEGATIVE
LYMPHOCYTES # BLD AUTO: 1.3 10*3/MM3 (ref 0.7–3.1)
LYMPHOCYTES NFR BLD AUTO: 20.1 % (ref 19.6–45.3)
MCH RBC QN AUTO: 32.2 PG (ref 26.6–33)
MCHC RBC AUTO-ENTMCNC: 34.2 G/DL (ref 31.5–35.7)
MCV RBC AUTO: 94.3 FL (ref 79–97)
MONOCYTES # BLD AUTO: 0.65 10*3/MM3 (ref 0.1–0.9)
MONOCYTES NFR BLD AUTO: 10 % (ref 5–12)
NEUTROPHILS NFR BLD AUTO: 3.99 10*3/MM3 (ref 1.7–7)
NEUTROPHILS NFR BLD AUTO: 61.6 % (ref 42.7–76)
NITRITE UR QL STRIP: NEGATIVE
NRBC BLD AUTO-RTO: 0 /100 WBC (ref 0–0.2)
PH UR STRIP.AUTO: 6 [PH] (ref 5–8)
PLATELET # BLD AUTO: 205 10*3/MM3 (ref 140–450)
PMV BLD AUTO: 11.6 FL (ref 6–12)
POTASSIUM SERPL-SCNC: 4.1 MMOL/L (ref 3.5–5.2)
PROT SERPL-MCNC: 6.8 G/DL (ref 6–8.5)
PROT UR QL STRIP: ABNORMAL
RBC # BLD AUTO: 5.4 10*6/MM3 (ref 4.14–5.8)
SODIUM SERPL-SCNC: 140 MMOL/L (ref 136–145)
SP GR UR STRIP: 1.03 (ref 1–1.03)
T4 FREE SERPL-MCNC: 1.29 NG/DL (ref 0.93–1.7)
TRIGL SERPL-MCNC: 193 MG/DL (ref 0–150)
TSH SERPL DL<=0.05 MIU/L-ACNC: 1.95 UIU/ML (ref 0.27–4.2)
UROBILINOGEN UR QL STRIP: ABNORMAL
VLDLC SERPL-MCNC: 32 MG/DL (ref 5–40)
WBC NRBC COR # BLD: 6.48 10*3/MM3 (ref 3.4–10.8)

## 2022-05-24 PROCEDURE — 36415 COLL VENOUS BLD VENIPUNCTURE: CPT

## 2022-05-24 PROCEDURE — 83036 HEMOGLOBIN GLYCOSYLATED A1C: CPT

## 2022-05-24 PROCEDURE — 81003 URINALYSIS AUTO W/O SCOPE: CPT

## 2022-05-24 PROCEDURE — 84439 ASSAY OF FREE THYROXINE: CPT

## 2022-05-24 PROCEDURE — 82306 VITAMIN D 25 HYDROXY: CPT

## 2022-05-24 PROCEDURE — 80050 GENERAL HEALTH PANEL: CPT

## 2022-05-24 PROCEDURE — 80061 LIPID PANEL: CPT

## 2022-05-24 PROCEDURE — 99214 OFFICE O/P EST MOD 30 MIN: CPT | Performed by: PHYSICIAN ASSISTANT

## 2022-05-24 RX ORDER — AMLODIPINE BESYLATE 10 MG/1
10 TABLET ORAL DAILY
Qty: 90 TABLET | Refills: 1 | Status: SHIPPED | OUTPATIENT
Start: 2022-05-24 | End: 2022-11-30 | Stop reason: SDUPTHER

## 2022-05-24 RX ORDER — LANSOPRAZOLE 15 MG/1
15 CAPSULE, DELAYED RELEASE ORAL DAILY
Qty: 90 CAPSULE | Refills: 1 | Status: SHIPPED | OUTPATIENT
Start: 2022-05-24 | End: 2022-11-30 | Stop reason: SDUPTHER

## 2022-05-24 RX ORDER — LEVOTHYROXINE SODIUM 0.05 MG/1
50 TABLET ORAL DAILY
Qty: 90 TABLET | Refills: 1 | Status: SHIPPED | OUTPATIENT
Start: 2022-05-24 | End: 2022-11-30 | Stop reason: SDUPTHER

## 2022-05-24 RX ORDER — ATENOLOL 100 MG/1
100 TABLET ORAL DAILY
Qty: 90 TABLET | Refills: 1 | Status: SHIPPED | OUTPATIENT
Start: 2022-05-24 | End: 2022-11-30 | Stop reason: SDUPTHER

## 2022-05-24 RX ORDER — ROSUVASTATIN CALCIUM 40 MG/1
40 TABLET, COATED ORAL
Qty: 90 TABLET | Refills: 1 | Status: SHIPPED | OUTPATIENT
Start: 2022-05-24 | End: 2022-11-30 | Stop reason: SDUPTHER

## 2022-05-24 RX ORDER — SILDENAFIL 100 MG/1
100 TABLET, FILM COATED ORAL DAILY PRN
Qty: 30 TABLET | Refills: 2 | Status: SHIPPED | OUTPATIENT
Start: 2022-05-24 | End: 2022-11-30 | Stop reason: SDUPTHER

## 2022-05-24 NOTE — PROGRESS NOTES
Chief Complaint  Hypertension (6 month follow up), Hyperlipidemia, Hypothyroidism, and Vitamin D Deficiency    Subjective          Reinier Moctezuma presents to [facility]  History of Present Illness   Pt presents today for 6 month follow up on htn, hld and hypothyroidism.    Pt states no new issues or concerns to discuss today.    Requesting refills.    adriano-Aldair/Pricila-every 6 months    Labs 12/28/21  LDL 81  US aaa 3/16/22  cln 1/2021    No CP, SOA, HA - pt is doing well overall.  Using his inhaler PRN    Past Medical History:   Diagnosis Date   • Allergic rhinitis due to allergen 03/01/2019   • Arthritis    • COPD (chronic obstructive pulmonary disease) (HCC)    • Erectile dysfunction 10/16/2015   • Esophageal reflux disease    • Essential hypertension 08/04/2017   • GERD (gastroesophageal reflux disease) 03/01/2019   • HLD (hyperlipidemia)    • Hypothyroidism 03/17/2016   • Limb swelling    • Nicotine dependence 08/04/2017   • Peyronie's disease    • Polycythemia secondary to smoking    • Prostatitis    • Seasonal allergies    • Sinus trouble    • SOB (shortness of breath)    • Tobacco use disorder 10/16/2015   • Vitamin D deficiency 05/25/2021   • Wrist sprain 07/08/2015      Family History   Problem Relation Age of Onset   • Aneurysm Mother    • Heart disease Father    • Cancer Father    • Nephrolithiasis Father    • Lung cancer Father    • Cancer Brother    • Colon cancer Brother 65   • Liver cancer Brother 65   • Diabetes Maternal Grandmother    • Diabetes Other       Past Surgical History:   Procedure Laterality Date   • COLONOSCOPY  2008   • CYST REMOVAL     • CYSTOSCOPY     • HEEL SPUR SURGERY     • NOSE SURGERY  2011    DR ZAMBRANO   • SHOULDER SURGERY     • VASECTOMY          Current Outpatient Medications:   •  albuterol sulfate  (90 Base) MCG/ACT inhaler, Inhale 2 puffs Every 4 (Four) Hours As Needed for Wheezing or Shortness of Air for up to 90 days., Disp: 54  "g, Rfl: 3  •  amLODIPine (NORVASC) 10 MG tablet, Take 1 tablet by mouth Daily., Disp: 90 tablet, Rfl: 1  •  aspirin 81 MG oral suspension, Take 81 mg by mouth Daily., Disp: , Rfl:   •  atenolol (TENORMIN) 100 MG tablet, Take 1 tablet by mouth Daily., Disp: 90 tablet, Rfl: 1  •  cetirizine (zyrTEC) 10 MG tablet, Take 10 mg by mouth Daily., Disp: , Rfl:   •  ezetimibe (ZETIA) 10 MG tablet, Take 1 tablet by mouth Daily., Disp: 90 tablet, Rfl: 3  •  lansoprazole (Prevacid) 15 MG capsule, Take 1 capsule by mouth Daily., Disp: 90 capsule, Rfl: 1  •  levothyroxine (SYNTHROID, LEVOTHROID) 50 MCG tablet, Take 1 tablet by mouth Daily., Disp: 90 tablet, Rfl: 1  •  naproxen (NAPROSYN) 500 MG tablet, Take 1 tablet by mouth 2 (Two) Times a Day As Needed for Pain., Disp: 36 tablet, Rfl: 1  •  rosuvastatin (CRESTOR) 40 MG tablet, Take 1 tablet by mouth every night at bedtime., Disp: 90 tablet, Rfl: 1  •  umeclidinium-vilanterol (ANORO ELLIPTA) 62.5-25 MCG/INH aerosol powder  inhaler, Inhale 1 puff Daily for 30 days., Disp: 60 each, Rfl: 11    Objective     Vital Signs:     /71 (BP Location: Left arm)   Pulse 64   Ht 177.8 cm (70\")   Wt 91.2 kg (201 lb)   SpO2 97%   BMI 28.84 kg/m²    Estimated body mass index is 28.84 kg/m² as calculated from the following:    Height as of this encounter: 177.8 cm (70\").    Weight as of this encounter: 91.2 kg (201 lb).     Wt Readings from Last 3 Encounters:   05/24/22 91.2 kg (201 lb)   05/11/22 90.7 kg (200 lb)   01/06/22 92.8 kg (204 lb 9.4 oz)     BP Readings from Last 3 Encounters:   05/24/22 139/71   05/11/22 116/76   03/21/22 102/66     Physical Exam  Vitals and nursing note reviewed.   Constitutional:       Appearance: Normal appearance.   HENT:      Head: Normocephalic and atraumatic.   Cardiovascular:      Rate and Rhythm: Normal rate and regular rhythm.      Heart sounds: Normal heart sounds.   Pulmonary:      Effort: Pulmonary effort is normal.      Breath sounds: Normal " breath sounds.   Musculoskeletal:      Cervical back: Neck supple.   Neurological:      Mental Status: He is alert.   Psychiatric:         Mood and Affect: Mood normal.         Behavior: Behavior normal.        Result Review :     Common labs    Common Labsle 8/24/21 8/24/21 12/28/21 12/28/21 1/6/22 1/6/22    0926 0926 0802 0802 [id number] 2132   Glucose  152 (A)    139 (A)   BUN  13    18   Creatinine  0.90    0.94   eGFR Non African Am  85    81   Sodium  135 (A)    138   Potassium  3.5    3.2 (A)   Chloride  102    103   Calcium  9.0    9.5   Albumin  4.43    4.70   Total Bilirubin  0.3    0.5   Alkaline Phosphatase  71    66   AST (SGOT)  19    25   ALT (SGPT)  26    36   WBC 5.13    9.64    Hemoglobin 16.5    17.6    Hematocrit 47.7    48.7    Platelets 195    225    Total Cholesterol   144      Triglycerides   206 (A)      HDL Cholesterol   28 (A)      LDL Cholesterol    81      PSA    0.962     (A) Abnormal value       Comments are available for some flowsheets but are not being displayed.                    Patient Care Team:  Sixto Connell PA as PCP - General (Physician Assistant)  Reva Pedro MD as Consulting Physician (Hematology and Oncology)         Assessment and Plan      Diagnoses and all orders for this visit:    1. Essential hypertension (Primary)  -     Lipid Panel; Future  -     CBC & Differential; Future  -     Comprehensive Metabolic Panel; Future  -     Urinalysis With Culture If Indicated -; Future  -     amLODIPine (NORVASC) 10 MG tablet; Take 1 tablet by mouth Daily.  Dispense: 90 tablet; Refill: 1  -     atenolol (TENORMIN) 100 MG tablet; Take 1 tablet by mouth Daily.  Dispense: 90 tablet; Refill: 1    2. Hyperlipidemia, unspecified hyperlipidemia type  -     Lipid Panel; Future  -     CBC & Differential; Future  -     Comprehensive Metabolic Panel; Future  -     rosuvastatin (CRESTOR) 40 MG tablet; Take 1 tablet by mouth every night at bedtime.  Dispense: 90 tablet; Refill: 1    3.  Hypothyroidism, unspecified type  -     TSH; Future  -     T4, Free; Future  -     levothyroxine (SYNTHROID, LEVOTHROID) 50 MCG tablet; Take 1 tablet by mouth Daily.  Dispense: 90 tablet; Refill: 1    4. Vitamin D deficiency  -     Vitamin D 25 Hydroxy; Future    5. Gastroesophageal reflux disease, unspecified whether esophagitis present  Comments:  Stable - on prevacid 30mg daily  Orders:  -     lansoprazole (Prevacid) 15 MG capsule; Take 1 capsule by mouth Daily.  Dispense: 90 capsule; Refill: 1    6. Hypothyroidism, unspecified type  Comments:  Stable on synthroid 50mcg daily  Orders:  -     TSH; Future  -     T4, Free; Future  -     levothyroxine (SYNTHROID, LEVOTHROID) 50 MCG tablet; Take 1 tablet by mouth Daily.  Dispense: 90 tablet; Refill: 1    7. Hyperlipidemia, unspecified hyperlipidemia type  Comments:  stable on crestor 40mg nightly  Orders:  -     Lipid Panel; Future  -     CBC & Differential; Future  -     Comprehensive Metabolic Panel; Future  -     rosuvastatin (CRESTOR) 40 MG tablet; Take 1 tablet by mouth every night at bedtime.  Dispense: 90 tablet; Refill: 1       Follow Up     Return in about 6 months (around 11/24/2022).    Patient was given instructions and counseling regarding his condition or for health maintenance advice. Please see specific information pulled into the AVS if appropriate.     Pt will get [facility]les [facility] at [facility]    I have reviewed information obtained and documented by others and I have confirmed the accuracy of this documented note.    EDWAR Rhodes

## 2022-05-25 DIAGNOSIS — E55.9 VITAMIN D DEFICIENCY: Primary | ICD-10-CM

## 2022-05-25 LAB — HBA1C MFR BLD: 6.1 % (ref 4.8–5.6)

## 2022-05-25 RX ORDER — ERGOCALCIFEROL 1.25 MG/1
50000 CAPSULE ORAL WEEKLY
Qty: 13 CAPSULE | Refills: 1 | Status: SHIPPED | OUTPATIENT
Start: 2022-05-25 | End: 2022-11-30 | Stop reason: SDUPTHER

## 2022-11-07 ENCOUNTER — HOSPITAL ENCOUNTER (OUTPATIENT)
Dept: CT IMAGING | Facility: HOSPITAL | Age: 62
Discharge: HOME | End: 2022-11-07
Admitting: NURSE PRACTITIONER

## 2022-11-07 DIAGNOSIS — F17.211 CIGARETTE NICOTINE DEPENDENCE IN REMISSION: ICD-10-CM

## 2022-11-07 DIAGNOSIS — F17.201 TOBACCO ABUSE, IN REMISSION: ICD-10-CM

## 2022-11-07 PROCEDURE — 71271 CT THORAX LUNG CANCER SCR C-: CPT

## 2022-11-16 ENCOUNTER — OFFICE VISIT (OUTPATIENT)
Dept: PULMONOLOGY | Facility: CLINIC | Age: 62
End: 2022-11-16

## 2022-11-16 VITALS
HEART RATE: 52 BPM | HEIGHT: 70 IN | WEIGHT: 191 LBS | BODY MASS INDEX: 27.35 KG/M2 | OXYGEN SATURATION: 96 % | DIASTOLIC BLOOD PRESSURE: 80 MMHG | SYSTOLIC BLOOD PRESSURE: 119 MMHG | TEMPERATURE: 97.7 F | RESPIRATION RATE: 19 BRPM

## 2022-11-16 DIAGNOSIS — J45.20 MILD INTERMITTENT REACTIVE AIRWAY DISEASE WITHOUT COMPLICATION: ICD-10-CM

## 2022-11-16 DIAGNOSIS — Z87.891 PERSONAL HISTORY OF NICOTINE DEPENDENCE: Primary | ICD-10-CM

## 2022-11-16 DIAGNOSIS — J44.9 OBSTRUCTIVE AIRWAY DISEASE: ICD-10-CM

## 2022-11-16 PROBLEM — J45.909 REACTIVE AIRWAY DISEASE: Status: ACTIVE | Noted: 2022-11-16

## 2022-11-16 PROCEDURE — 99213 OFFICE O/P EST LOW 20 MIN: CPT | Performed by: INTERNAL MEDICINE

## 2022-11-16 PROCEDURE — 90471 IMMUNIZATION ADMIN: CPT | Performed by: INTERNAL MEDICINE

## 2022-11-16 PROCEDURE — 90686 IIV4 VACC NO PRSV 0.5 ML IM: CPT | Performed by: INTERNAL MEDICINE

## 2022-11-16 NOTE — PROGRESS NOTES
"Chief Complaint  Sleep Apnea, Obstructive airway disease (HCC) , Follow-up (6 Month ), Cough (At times ), and Wheezing (At times )    Subjective        Reinier Moctezuma presents to [facility]  History of Present Illness  Follow-up for COPD  Symptoms controlled  Uses rescue inhaler very infrequently  His symptoms are so controlled he uses Anoro less than daily  No increased cough no increased sputum production  Objective   Vital Signs:  /80 (BP Location: Left arm, Patient Position: Sitting, Cuff Size: Large Adult)   Pulse 52   Temp 97.7 °F (36.5 °C) (Temporal)   Resp 19   Ht 177.8 cm (70\")   Wt 86.6 kg (191 lb)   SpO2 96% Comment: room air  BMI 27.41 kg/m²   Estimated body mass index is 27.41 kg/m² as calculated from the following:    Height as of this encounter: 177.8 cm (70\").    Weight as of this encounter: 86.6 kg (191 lb).          Physical Exam   Vital Signs Reviewed  General WDWN, Alert, NAD.    Chest:  good aeration, clear to auscultation bilaterally, tympanic to percussion bilaterally, no work of breathing noted  CV: RRR, no MGR, .  EXT:  no clubbing, no cyanosis, no edema, no joint tenderness  Neuro:  A&Ox3, CN grossly intact, no focal deficits.  Skin: No rashes or lesions noted  Result Review :                Assessment and Plan   Diagnoses and all orders for this visit:    1. Personal history of nicotine dependence (Primary)  -      CT Chest Low Dose Cancer Screening WO; Future    2. Obstructive airway disease (HCC)    3. Mild intermittent reactive airway disease without complication  Assessment & Plan:  Past PFTs did not show the presence of COPD  CT scan with no emphysema  At this time we will continue with as needed albuterol  Continue Anoro    Does meet criteria for low-dose lung cancer screening    Influenza vaccine today      Other orders  -     FluLaval/Fluzone >6 mos ([id number])           Follow Up   Return in about 1 year (around " 11/16/2023).  Patient was given instructions and counseling regarding his condition or for health maintenance advice. Please see specific information pulled into the AVS if appropriate.

## 2022-11-16 NOTE — ASSESSMENT & PLAN NOTE
Past PFTs did not show the presence of COPD  CT scan with no emphysema  At this time we will continue with as needed albuterol  Continue Anoro    Does meet criteria for low-dose lung cancer screening    Influenza vaccine today

## 2022-11-30 ENCOUNTER — OFFICE VISIT (OUTPATIENT)
Dept: FAMILY MEDICINE CLINIC | Facility: CLINIC | Age: 62
End: 2022-11-30

## 2022-11-30 ENCOUNTER — LAB (OUTPATIENT)
Dept: LAB | Facility: HOSPITAL | Age: 62
End: 2022-11-30

## 2022-11-30 VITALS
HEIGHT: 70 IN | WEIGHT: 190 LBS | DIASTOLIC BLOOD PRESSURE: 66 MMHG | BODY MASS INDEX: 27.2 KG/M2 | SYSTOLIC BLOOD PRESSURE: 113 MMHG | OXYGEN SATURATION: 98 % | HEART RATE: 57 BPM

## 2022-11-30 DIAGNOSIS — E55.9 VITAMIN D DEFICIENCY: ICD-10-CM

## 2022-11-30 DIAGNOSIS — E03.9 HYPOTHYROIDISM, UNSPECIFIED TYPE: Chronic | ICD-10-CM

## 2022-11-30 DIAGNOSIS — E78.5 HYPERLIPIDEMIA, UNSPECIFIED HYPERLIPIDEMIA TYPE: Chronic | ICD-10-CM

## 2022-11-30 DIAGNOSIS — Z01.89 ROUTINE LAB DRAW: ICD-10-CM

## 2022-11-30 DIAGNOSIS — I10 ESSENTIAL HYPERTENSION: Primary | ICD-10-CM

## 2022-11-30 DIAGNOSIS — J30.2 SEASONAL ALLERGIC RHINITIS, UNSPECIFIED TRIGGER: ICD-10-CM

## 2022-11-30 DIAGNOSIS — N52.9 ERECTILE DYSFUNCTION, UNSPECIFIED ERECTILE DYSFUNCTION TYPE: ICD-10-CM

## 2022-11-30 DIAGNOSIS — R73.09 ELEVATED HEMOGLOBIN A1C: ICD-10-CM

## 2022-11-30 DIAGNOSIS — K21.9 GASTROESOPHAGEAL REFLUX DISEASE, UNSPECIFIED WHETHER ESOPHAGITIS PRESENT: Chronic | ICD-10-CM

## 2022-11-30 LAB
25(OH)D3 SERPL-MCNC: 45.2 NG/ML (ref 30–100)
ALBUMIN SERPL-MCNC: 4.5 G/DL (ref 3.5–5.2)
ALBUMIN/GLOB SERPL: 2 G/DL
ALP SERPL-CCNC: 61 U/L (ref 39–117)
ALT SERPL W P-5'-P-CCNC: 30 U/L (ref 1–41)
ANION GAP SERPL CALCULATED.3IONS-SCNC: 12 MMOL/L (ref 5–15)
AST SERPL-CCNC: 22 U/L (ref 1–40)
BASOPHILS # BLD AUTO: 0.04 10*3/MM3 (ref 0–0.2)
BASOPHILS NFR BLD AUTO: 0.8 % (ref 0–1.5)
BILIRUB SERPL-MCNC: 0.5 MG/DL (ref 0–1.2)
BUN SERPL-MCNC: 9 MG/DL (ref 8–23)
BUN/CREAT SERPL: 8.7 (ref 7–25)
CALCIUM SPEC-SCNC: 9.3 MG/DL (ref 8.6–10.5)
CHLORIDE SERPL-SCNC: 105 MMOL/L (ref 98–107)
CHOLEST SERPL-MCNC: 119 MG/DL (ref 0–200)
CO2 SERPL-SCNC: 23 MMOL/L (ref 22–29)
CREAT SERPL-MCNC: 1.03 MG/DL (ref 0.76–1.27)
DEPRECATED RDW RBC AUTO: 43 FL (ref 37–54)
EGFRCR SERPLBLD CKD-EPI 2021: 81.1 ML/MIN/1.73
EOSINOPHIL # BLD AUTO: 0.39 10*3/MM3 (ref 0–0.4)
EOSINOPHIL NFR BLD AUTO: 7.4 % (ref 0.3–6.2)
ERYTHROCYTE [DISTWIDTH] IN BLOOD BY AUTOMATED COUNT: 12.9 % (ref 12.3–15.4)
GLOBULIN UR ELPH-MCNC: 2.2 GM/DL
GLUCOSE SERPL-MCNC: 104 MG/DL (ref 65–99)
HBA1C MFR BLD: 6.3 % (ref 4.8–5.6)
HCT VFR BLD AUTO: 55.2 % (ref 37.5–51)
HDLC SERPL-MCNC: 31 MG/DL (ref 40–60)
HGB BLD-MCNC: 18.6 G/DL (ref 13–17.7)
IMM GRANULOCYTES # BLD AUTO: 0.01 10*3/MM3 (ref 0–0.05)
IMM GRANULOCYTES NFR BLD AUTO: 0.2 % (ref 0–0.5)
LDLC SERPL CALC-MCNC: 58 MG/DL (ref 0–100)
LDLC/HDLC SERPL: 1.69 {RATIO}
LYMPHOCYTES # BLD AUTO: 1.24 10*3/MM3 (ref 0.7–3.1)
LYMPHOCYTES NFR BLD AUTO: 23.5 % (ref 19.6–45.3)
MCH RBC QN AUTO: 30.8 PG (ref 26.6–33)
MCHC RBC AUTO-ENTMCNC: 33.7 G/DL (ref 31.5–35.7)
MCV RBC AUTO: 91.5 FL (ref 79–97)
MONOCYTES # BLD AUTO: 0.49 10*3/MM3 (ref 0.1–0.9)
MONOCYTES NFR BLD AUTO: 9.3 % (ref 5–12)
NEUTROPHILS NFR BLD AUTO: 3.11 10*3/MM3 (ref 1.7–7)
NEUTROPHILS NFR BLD AUTO: 58.8 % (ref 42.7–76)
NRBC BLD AUTO-RTO: 0 /100 WBC (ref 0–0.2)
PLATELET # BLD AUTO: 184 10*3/MM3 (ref 140–450)
PMV BLD AUTO: 10.7 FL (ref 6–12)
POTASSIUM SERPL-SCNC: 4.9 MMOL/L (ref 3.5–5.2)
PROT SERPL-MCNC: 6.7 G/DL (ref 6–8.5)
RBC # BLD AUTO: 6.03 10*6/MM3 (ref 4.14–5.8)
SODIUM SERPL-SCNC: 140 MMOL/L (ref 136–145)
T4 FREE SERPL-MCNC: 1.26 NG/DL (ref 0.93–1.7)
TRIGL SERPL-MCNC: 178 MG/DL (ref 0–150)
VLDLC SERPL-MCNC: 30 MG/DL (ref 5–40)
WBC NRBC COR # BLD: 5.28 10*3/MM3 (ref 3.4–10.8)

## 2022-11-30 PROCEDURE — 84439 ASSAY OF FREE THYROXINE: CPT

## 2022-11-30 PROCEDURE — 80061 LIPID PANEL: CPT

## 2022-11-30 PROCEDURE — 84436 ASSAY OF TOTAL THYROXINE: CPT

## 2022-11-30 PROCEDURE — 84479 ASSAY OF THYROID (T3 OR T4): CPT

## 2022-11-30 PROCEDURE — 80050 GENERAL HEALTH PANEL: CPT

## 2022-11-30 PROCEDURE — 82306 VITAMIN D 25 HYDROXY: CPT

## 2022-11-30 PROCEDURE — 84480 ASSAY TRIIODOTHYRONINE (T3): CPT

## 2022-11-30 PROCEDURE — 99214 OFFICE O/P EST MOD 30 MIN: CPT | Performed by: NURSE PRACTITIONER

## 2022-11-30 PROCEDURE — 83036 HEMOGLOBIN GLYCOSYLATED A1C: CPT

## 2022-11-30 PROCEDURE — 36415 COLL VENOUS BLD VENIPUNCTURE: CPT

## 2022-11-30 RX ORDER — SILDENAFIL 100 MG/1
100 TABLET, FILM COATED ORAL DAILY PRN
Qty: 30 TABLET | Refills: 2 | Status: SHIPPED | OUTPATIENT
Start: 2022-11-30 | End: 2023-02-02 | Stop reason: SDUPTHER

## 2022-11-30 RX ORDER — ATENOLOL 100 MG/1
100 TABLET ORAL DAILY
Qty: 90 TABLET | Refills: 1 | Status: SHIPPED | OUTPATIENT
Start: 2022-11-30

## 2022-11-30 RX ORDER — CETIRIZINE HYDROCHLORIDE 10 MG/1
10 TABLET ORAL DAILY
Qty: 90 TABLET | Refills: 1 | Status: SHIPPED | OUTPATIENT
Start: 2022-11-30

## 2022-11-30 RX ORDER — EZETIMIBE 10 MG/1
10 TABLET ORAL DAILY
Qty: 90 TABLET | Refills: 1 | Status: SHIPPED | OUTPATIENT
Start: 2022-11-30

## 2022-11-30 RX ORDER — ROSUVASTATIN CALCIUM 40 MG/1
40 TABLET, COATED ORAL
Qty: 90 TABLET | Refills: 1 | Status: SHIPPED | OUTPATIENT
Start: 2022-11-30

## 2022-11-30 RX ORDER — LEVOTHYROXINE SODIUM 0.05 MG/1
50 TABLET ORAL DAILY
Qty: 90 TABLET | Refills: 1 | Status: SHIPPED | OUTPATIENT
Start: 2022-11-30

## 2022-11-30 RX ORDER — AMLODIPINE BESYLATE 10 MG/1
10 TABLET ORAL DAILY
Qty: 90 TABLET | Refills: 1 | Status: SHIPPED | OUTPATIENT
Start: 2022-11-30

## 2022-11-30 RX ORDER — ERGOCALCIFEROL 1.25 MG/1
50000 CAPSULE ORAL WEEKLY
Qty: 13 CAPSULE | Refills: 1 | Status: SHIPPED | OUTPATIENT
Start: 2022-11-30

## 2022-11-30 RX ORDER — LANSOPRAZOLE 15 MG/1
15 CAPSULE, DELAYED RELEASE ORAL DAILY
Qty: 90 CAPSULE | Refills: 1 | Status: SHIPPED | OUTPATIENT
Start: 2022-11-30

## 2022-11-30 NOTE — PROGRESS NOTES
Chief Complaint  Hypertension, Hypothyroidism, Hyperlipidemia, and Establish Care (NP/RB patient is here today today to establish care, patient states no concerns or issues to discuss )    SUBJECTIVE  Reinier Moctezuma presents to [facility]   Hypertension-patiently currently on amlodipine, and atenolol with good control of blood pressure.  Patient denies any chest pain dizziness headache.    Hypothyroid-patient currently on Synthroid 50 mcg daily.    GERD-currently taking Prevacid daily with good control of symptoms.  Patient does not have to substitute with any additional medication.    Hyperlipidemia-currently taking Zetia and Crestor.  Patient denies any leg cramps or myalgias.    Allergic rhinitis-patient taking Zyrtec daily with good control of symptoms.          History of Present Illness  Past Medical History:   Diagnosis Date   • Allergic rhinitis due to allergen 03/01/2019   • Arthritis    • COPD (chronic obstructive pulmonary disease) (HCC)    • Erectile dysfunction 10/16/2015   • Esophageal reflux disease    • Essential hypertension 08/04/2017   • GERD (gastroesophageal reflux disease) 03/01/2019   • HLD (hyperlipidemia)    • Hypothyroidism 03/17/2016   • Limb swelling    • Nicotine dependence 08/04/2017   • Peyronie's disease    • Polycythemia secondary to smoking    • Prostatitis    • Seasonal allergies    • Sinus trouble    • SOB (shortness of breath)    • Tobacco use disorder 10/16/2015   • Vitamin D deficiency 05/25/2021   • Wrist sprain 07/08/2015      Family History   Problem Relation Age of Onset   • Aneurysm Mother    • Heart disease Father    • Cancer Father    • Nephrolithiasis Father    • Lung cancer Father    • Cancer Brother    • Colon cancer Brother 65   • Liver cancer Brother 65   • Diabetes Maternal Grandmother    • Diabetes Other       Past Surgical History:   Procedure Laterality Date   • COLONOSCOPY  2008   • CYST REMOVAL     • CYSTOSCOPY     • HEEL SPUR  "SURGERY     • NOSE SURGERY  2011    DR ZAMBRANO   • SHOULDER SURGERY     • VASECTOMY          Current Outpatient Medications:   •  amLODIPine (NORVASC) 10 MG tablet, Take 1 tablet by mouth Daily., Disp: 90 tablet, Rfl: 1  •  aspirin 81 MG oral suspension, Take 81 mg by mouth Daily., Disp: , Rfl:   •  atenolol (TENORMIN) 100 MG tablet, Take 1 tablet by mouth Daily., Disp: 90 tablet, Rfl: 1  •  cetirizine (zyrTEC) 10 MG tablet, Take 1 tablet by mouth Daily., Disp: 90 tablet, Rfl: 1  •  ezetimibe (ZETIA) 10 MG tablet, Take 1 tablet by mouth Daily., Disp: 90 tablet, Rfl: 1  •  lansoprazole (Prevacid) 15 MG capsule, Take 1 capsule by mouth Daily., Disp: 90 capsule, Rfl: 1  •  levothyroxine (SYNTHROID, LEVOTHROID) 50 MCG tablet, Take 1 tablet by mouth Daily., Disp: 90 tablet, Rfl: 1  •  naproxen (NAPROSYN) 500 MG tablet, Take 1 tablet by mouth 2 (Two) Times a Day As Needed for Pain., Disp: 36 tablet, Rfl: 1  •  rosuvastatin (CRESTOR) 40 MG tablet, Take 1 tablet by mouth every night at bedtime., Disp: 90 tablet, Rfl: 1  •  sildenafil (Viagra) 100 MG tablet, Take 1 tablet by mouth Daily As Needed for Erectile Dysfunction., Disp: 30 tablet, Rfl: 2  •  umeclidinium-vilanterol (ANORO ELLIPTA) 62.5-25 MCG/INH aerosol powder  inhaler, Inhale 1 puff Daily for 30 days., Disp: 60 each, Rfl: 11  •  vitamin D (ERGOCALCIFEROL) 1.25 MG (76553 UT) capsule capsule, Take 1 capsule by mouth 1 (One) Time Per Week., Disp: 13 capsule, Rfl: 1  •  albuterol sulfate  (90 Base) MCG/ACT inhaler, Inhale 2 puffs Every 4 (Four) Hours As Needed for Wheezing or Shortness of Air for up to 90 days., Disp: 54 g, Rfl: 3    OBJECTIVE  Vital Signs:   /66 (BP Location: Left arm)   Pulse 57   Ht 177.8 cm (70\")   Wt 86.2 kg (190 lb)   SpO2 98%   BMI 27.26 kg/m²    Estimated body mass index is 27.26 kg/m² as calculated from the following:    Height as of this encounter: 177.8 cm (70\").    Weight as of this encounter: 86.2 kg (190 lb).     Wt " Readings from Last 3 Encounters:   11/30/22 86.2 kg (190 lb)   11/16/22 86.6 kg (191 lb)   05/24/22 91.2 kg (201 lb)     BP Readings from Last 3 Encounters:   11/30/22 113/66   11/16/22 119/80   05/24/22 139/71       Physical Exam  Vitals reviewed.   Constitutional:       Appearance: Normal appearance. He is well-developed.   HENT:      Head: Normocephalic and atraumatic.      Right Ear: External ear normal.      Left Ear: External ear normal.      Mouth/Throat:      Pharynx: No oropharyngeal exudate.   Eyes:      Conjunctiva/sclera: Conjunctivae normal.      Pupils: Pupils are equal, round, and reactive to light.   Neck:      Vascular: No carotid bruit.   Cardiovascular:      Rate and Rhythm: Normal rate and regular rhythm.      Heart sounds: No murmur heard.    No friction rub. No gallop.   Pulmonary:      Effort: Pulmonary effort is normal.      Breath sounds: Normal breath sounds. No wheezing or rhonchi.   Skin:     General: Skin is warm and dry.   Neurological:      Mental Status: He is alert and oriented to person, place, and time.      Cranial Nerves: No cranial nerve deficit.   Psychiatric:         Mood and Affect: Mood and affect normal.         Behavior: Behavior normal.         Thought Content: Thought content normal.         Judgment: Judgment normal.          Result Review    CMP    CMP 1/6/22 5/24/22 11/30/22   Glucose 139 (A) 134 (A) 104 (A)   BUN 18 10 9   Creatinine 0.94 0.98 1.03   eGFR Non African Am 81     Sodium 138 140 140   Potassium 3.2 (A) 4.1 4.9   Chloride 103 105 105   Calcium 9.5 9.3 9.3   Albumin 4.70 4.50 4.50   Total Bilirubin 0.5 0.4 0.5   Alkaline Phosphatase 66 66 61   AST (SGOT) 25 20 22   ALT (SGPT) 36 26 30   (A) Abnormal value       Comments are available for some flowsheets but are not being displayed.           CBC    CBC 1/6/22 5/24/22 11/30/22   WBC 9.64 6.48 5.28   RBC 5.64 5.40 6.03 (A)   Hemoglobin 17.6 17.4 18.6 (A)   Hematocrit 48.7 50.9 55.2 (A)   MCV 86.3 94.3 91.5    MCH 31.2 32.2 30.8   MCHC 36.1 (A) 34.2 33.7   RDW 13.3 14.5 12.9   Platelets 225 205 184   (A) Abnormal value            Lipid Panel    Lipid Panel 12/28/21 5/24/22 11/30/22   Total Cholesterol 144 128 119   Triglycerides 206 (A) 193 (A) 178 (A)   HDL Cholesterol 28 (A) 29 (A) 31 (A)   VLDL Cholesterol 35 32 30   LDL Cholesterol  81 67 58   LDL/HDL Ratio 2.67 2.08 1.69   (A) Abnormal value            TSH    TSH 12/28/21 5/24/22 11/30/22   TSH 3.810 1.950 2.130           Most Recent A1C    HGBA1C Most Recent 11/30/22   Hemoglobin A1C 6.30 (A)   (A) Abnormal value            PSA    PSA 12/28/21   PSA 0.962             CT Chest Low Dose Cancer Screening WO    Result Date: 11/7/2022    1. No evidence for acute intrathoracic abnormality.  No suspicious pulmonary nodules. 2. Ancillary findings as described above.      LASHAY STANLEY MD       Electronically Signed and Approved By: LASHAY STANLEY MD on 11/07/2022 at 8:40                The above data has been reviewed by BRAN Pruitt 11/30/2022 07:31 EST.          Patient Care Team:  Margie Belcher APRN as PCP - General (Family Medicine)  Reva Pedro MD as Consulting Physician (Hematology and Oncology)  Tej Mcnulty DO as Consulting Physician (Pulmonary Disease)    BMI is >= 25 and <30. (Overweight) The following options were offered after discussion;: weight loss educational material (shared in after visit summary)       ASSESSMENT & PLAN    Diagnoses and all orders for this visit:    1. Essential hypertension (Primary)  Comments:  BP well controlled, continue current medication.  Orders:  -     amLODIPine (NORVASC) 10 MG tablet; Take 1 tablet by mouth Daily.  Dispense: 90 tablet; Refill: 1  -     atenolol (TENORMIN) 100 MG tablet; Take 1 tablet by mouth Daily.  Dispense: 90 tablet; Refill: 1    2. Routine lab draw  -     Comprehensive Metabolic Panel; Future  -     CBC & Differential; Future    3. Gastroesophageal reflux disease, unspecified  whether esophagitis present  Comments:  Stable - on prevacid 30mg daily  Orders:  -     lansoprazole (Prevacid) 15 MG capsule; Take 1 capsule by mouth Daily.  Dispense: 90 capsule; Refill: 1    4. Hypothyroidism, unspecified type  Comments:  Stable on synthroid 50mcg daily  Orders:  -     levothyroxine (SYNTHROID, LEVOTHROID) 50 MCG tablet; Take 1 tablet by mouth Daily.  Dispense: 90 tablet; Refill: 1  -     Thyroid Profile II; Future  -     T4, Free; Future    5. Hyperlipidemia, unspecified hyperlipidemia type  Comments:  stable on crestor 40mg nightly  Orders:  -     Lipid Panel; Future  -     ezetimibe (ZETIA) 10 MG tablet; Take 1 tablet by mouth Daily.  Dispense: 90 tablet; Refill: 1  -     rosuvastatin (CRESTOR) 40 MG tablet; Take 1 tablet by mouth every night at bedtime.  Dispense: 90 tablet; Refill: 1    6. Erectile dysfunction, unspecified erectile dysfunction type  Comments:  Symptoms well controlled with current medication regimen, cont. Current meds.  Orders:  -     sildenafil (Viagra) 100 MG tablet; Take 1 tablet by mouth Daily As Needed for Erectile Dysfunction.  Dispense: 30 tablet; Refill: 2    7. Vitamin D deficiency  Comments:  Continue vitamin D supplement, will check labs  Orders:  -     vitamin D (ERGOCALCIFEROL) 1.25 MG (13350 UT) capsule capsule; Take 1 capsule by mouth 1 (One) Time Per Week.  Dispense: 13 capsule; Refill: 1  -     Vitamin D,25-Hydroxy; Future    8. Elevated hemoglobin A1c  Comments:  Check hemoglobin A1c  Orders:  -     Hemoglobin A1c; Future    9. Seasonal allergic rhinitis, unspecified trigger  Comments:  cont zyrtec prn  Orders:  -     cetirizine (zyrTEC) 10 MG tablet; Take 1 tablet by mouth Daily.  Dispense: 90 tablet; Refill: 1         Tobacco Use: Medium Risk   • Smoking Tobacco Use: Former   • Smokeless Tobacco Use: Never   • Passive Exposure: Never       Follow Up     Return in about 6 months (around 5/30/2023).        Patient was given instructions and counseling  regarding his condition or for health maintenance advice. Please see specific information pulled into the AVS if appropriate.   I have reviewed information obtained and documented by others and I have confirmed the accuracy of this documented note.    BRAN Pruitt

## 2022-12-01 LAB
FT4I SERPL CALC-MCNC: 1.9 (ref 1.2–4.9)
T3 SERPL-MCNC: 130 NG/DL (ref 71–180)
T3RU NFR SERPL: 22 % (ref 24–39)
T4 SERPL-MCNC: 8.7 UG/DL (ref 4.5–12)
TSH SERPL DL<=0.005 MIU/L-ACNC: 2.13 UIU/ML (ref 0.45–4.5)

## 2022-12-07 ENCOUNTER — PATIENT ROUNDING (BHMG ONLY) (OUTPATIENT)
Dept: FAMILY MEDICINE CLINIC | Facility: CLINIC | Age: 62
End: 2022-12-07

## 2022-12-07 NOTE — PROGRESS NOTES
A Ziliko message has been sent to the patient for PATIENT ROUNDING with [facility].

## 2022-12-09 ENCOUNTER — TELEPHONE (OUTPATIENT)
Dept: FAMILY MEDICINE CLINIC | Facility: CLINIC | Age: 62
End: 2022-12-09

## 2022-12-09 DIAGNOSIS — R79.9 ABNORMAL BLOOD CHEMISTRY: Primary | ICD-10-CM

## 2022-12-09 NOTE — TELEPHONE ENCOUNTER
Phoned patient to give him lab results Hemoglobin A1c slightly elevated, but stable, continue tighter diet control.  Hemoglobin and hematocrit both elevated, repeat CBC in 1 month.  Triglycerides improved, continue Crestor and Zetia.  Otherwise normal, stable labs.  Continue all current medication. Order placed for CBC.

## 2022-12-09 NOTE — TELEPHONE ENCOUNTER
----- Message from BRAN Pruitt sent at 12/7/2022  7:26 AM EST -----  Hemoglobin A1c slightly elevated, but stable, continue tighter diet control.  Hemoglobin and hematocrit both elevated, repeat CBC in 1 month.  Triglycerides improved, continue Crestor and Zetia.  Otherwise normal, stable labs.  Continue all current medication.

## 2023-01-09 ENCOUNTER — OFFICE VISIT (OUTPATIENT)
Dept: CARDIOLOGY | Facility: CLINIC | Age: 63
End: 2023-01-09
Payer: COMMERCIAL

## 2023-01-09 ENCOUNTER — LAB (OUTPATIENT)
Dept: LAB | Facility: HOSPITAL | Age: 63
End: 2023-01-09
Payer: COMMERCIAL

## 2023-01-09 VITALS
HEIGHT: 70 IN | BODY MASS INDEX: 28.86 KG/M2 | DIASTOLIC BLOOD PRESSURE: 74 MMHG | HEART RATE: 54 BPM | SYSTOLIC BLOOD PRESSURE: 120 MMHG | WEIGHT: 201.6 LBS

## 2023-01-09 DIAGNOSIS — I25.10 CORONARY ARTERY CALCIFICATION: Primary | ICD-10-CM

## 2023-01-09 DIAGNOSIS — E78.2 MIXED HYPERLIPIDEMIA: ICD-10-CM

## 2023-01-09 DIAGNOSIS — I10 ESSENTIAL HYPERTENSION: ICD-10-CM

## 2023-01-09 DIAGNOSIS — R79.9 ABNORMAL BLOOD CHEMISTRY: ICD-10-CM

## 2023-01-09 DIAGNOSIS — I25.84 CORONARY ARTERY CALCIFICATION: Primary | ICD-10-CM

## 2023-01-09 PROBLEM — Z72.0 TOBACCO ABUSE: Status: RESOLVED | Noted: 2021-11-11 | Resolved: 2023-01-09

## 2023-01-09 LAB
BASOPHILS # BLD AUTO: 0.1 10*3/MM3 (ref 0–0.2)
BASOPHILS NFR BLD AUTO: 1.4 % (ref 0–1.5)
DEPRECATED RDW RBC AUTO: 43.4 FL (ref 37–54)
EOSINOPHIL # BLD AUTO: 0.43 10*3/MM3 (ref 0–0.4)
EOSINOPHIL NFR BLD AUTO: 6.1 % (ref 0.3–6.2)
ERYTHROCYTE [DISTWIDTH] IN BLOOD BY AUTOMATED COUNT: 13 % (ref 12.3–15.4)
HCT VFR BLD AUTO: 53.9 % (ref 37.5–51)
HGB BLD-MCNC: 18.7 G/DL (ref 13–17.7)
IMM GRANULOCYTES # BLD AUTO: 0.04 10*3/MM3 (ref 0–0.05)
IMM GRANULOCYTES NFR BLD AUTO: 0.6 % (ref 0–0.5)
LYMPHOCYTES # BLD AUTO: 1.69 10*3/MM3 (ref 0.7–3.1)
LYMPHOCYTES NFR BLD AUTO: 23.9 % (ref 19.6–45.3)
MCH RBC QN AUTO: 32 PG (ref 26.6–33)
MCHC RBC AUTO-ENTMCNC: 34.7 G/DL (ref 31.5–35.7)
MCV RBC AUTO: 92.1 FL (ref 79–97)
MONOCYTES # BLD AUTO: 0.73 10*3/MM3 (ref 0.1–0.9)
MONOCYTES NFR BLD AUTO: 10.3 % (ref 5–12)
NEUTROPHILS NFR BLD AUTO: 4.07 10*3/MM3 (ref 1.7–7)
NEUTROPHILS NFR BLD AUTO: 57.7 % (ref 42.7–76)
NRBC BLD AUTO-RTO: 0 /100 WBC (ref 0–0.2)
PLATELET # BLD AUTO: 213 10*3/MM3 (ref 140–450)
PMV BLD AUTO: 10.9 FL (ref 6–12)
RBC # BLD AUTO: 5.85 10*6/MM3 (ref 4.14–5.8)
WBC NRBC COR # BLD: 7.06 10*3/MM3 (ref 3.4–10.8)

## 2023-01-09 PROCEDURE — 85025 COMPLETE CBC W/AUTO DIFF WBC: CPT

## 2023-01-09 PROCEDURE — 99213 OFFICE O/P EST LOW 20 MIN: CPT | Performed by: INTERNAL MEDICINE

## 2023-01-09 PROCEDURE — 36415 COLL VENOUS BLD VENIPUNCTURE: CPT

## 2023-01-09 NOTE — PROGRESS NOTES
CARDIOLOGY FOLLOW-UP PROGRESS NOTE        Chief Complaint  Follow-up, Coronary Artery calcification, Hyperlipidemia, and Hypertension    Subjective            Reinier Moctezuma presents to [facility]  History of Present Illness    Mr. Moctezuma is here for annual follow-up visit.  He was previously seen and evaluated for coronary artery calcification detected on a CT scan done for lung cancer screening.  Since then patient underwent SPECT stress test, which was negative for ischemia.  Echocardiogram showed normal LV function as well.     Today patient denies any new complaints.  Specifically denies any chest pain or palpitations.  He does have shortness of breath on moderate exertion including climbing stairs.  He uses inhalers as needed.  No recent hospitalizations or ER visits.  Taking all the medications as prescribed.  He is currently not smoking.      Past History:    1) Hypertension; 2) Hypothyroidism; 3) Hyperlipidemia; 4) COPD, followed by Dr. Mcnulty; 5) Coronary artery calcification 6) Polycythemia    Medical History:  Past Medical History:   Diagnosis Date   • Allergic rhinitis due to allergen 03/01/2019   • Arthritis    • COPD (chronic obstructive pulmonary disease) (HCC)    • Erectile dysfunction 10/16/2015   • Esophageal reflux disease    • Essential hypertension 08/04/2017   • GERD (gastroesophageal reflux disease) 03/01/2019   • HLD (hyperlipidemia)    • Hypothyroidism 03/17/2016   • Limb swelling    • Nicotine dependence 08/04/2017   • Peyronie's disease    • Polycythemia secondary to smoking    • Prostatitis    • Seasonal allergies    • Sinus trouble    • SOB (shortness of breath)    • Tobacco use disorder 10/16/2015   • Vitamin D deficiency 05/25/2021   • Wrist sprain 07/08/2015       Surgical History: has a past surgical history that includes Colonoscopy (2008); Cyst Removal; Cystoscopy; Heel spur surgery; Nose surgery (2011); Shoulder surgery; and Vasectomy.     Family  History: family history includes Aneurysm in his mother; Cancer in his brother and father; Colon cancer (age of onset: 65) in his brother; Diabetes in his maternal grandmother and another family member; Heart disease in his father; Liver cancer (age of onset: 65) in his brother; Lung cancer in his father; Nephrolithiasis in his father.     Social History: reports that he quit smoking about 5 years ago. His smoking use included cigars. He has never been exposed to tobacco smoke. He has never used smokeless tobacco. He reports current alcohol use of about 3.0 standard drinks per week. He reports that he does not use drugs.    Allergies: Patient has no known allergies.    Current Outpatient Medications on File Prior to Visit   Medication Sig   • albuterol sulfate  (90 Base) MCG/ACT inhaler Inhale 2 puffs Every 4 (Four) Hours As Needed for Wheezing or Shortness of Air for up to 90 days.   • amLODIPine (NORVASC) 10 MG tablet Take 1 tablet by mouth Daily.   • aspirin 81 MG oral suspension Take 81 mg by mouth Daily.   • atenolol (TENORMIN) 100 MG tablet Take 1 tablet by mouth Daily.   • cetirizine (zyrTEC) 10 MG tablet Take 1 tablet by mouth Daily.   • ezetimibe (ZETIA) 10 MG tablet Take 1 tablet by mouth Daily.   • lansoprazole (Prevacid) 15 MG capsule Take 1 capsule by mouth Daily.   • levothyroxine (SYNTHROID, LEVOTHROID) 50 MCG tablet Take 1 tablet by mouth Daily.   • naproxen (NAPROSYN) 500 MG tablet Take 1 tablet by mouth 2 (Two) Times a Day As Needed for Pain.   • rosuvastatin (CRESTOR) 40 MG tablet Take 1 tablet by mouth every night at bedtime.   • sildenafil (Viagra) 100 MG tablet Take 1 tablet by mouth Daily As Needed for Erectile Dysfunction.   • umeclidinium-vilanterol (ANORO ELLIPTA) 62.5-25 MCG/INH aerosol powder  inhaler Inhale 1 puff Daily for 30 days.   • vitamin D (ERGOCALCIFEROL) 1.25 MG (90493 UT) capsule capsule Take 1 capsule by mouth 1 (One) Time Per Week.     No current facility-administered  medications on file prior to visit.          Review of Systems   Respiratory: Positive for shortness of breath. Negative for cough and wheezing.    Cardiovascular: Negative for chest pain, palpitations and leg swelling.   Gastrointestinal: Negative for nausea and vomiting.   Neurological: Negative for dizziness and syncope.        Objective     /74   Pulse 54   Ht 177.8 cm (70\")   Wt 91.4 kg (201 lb 9.6 oz)   BMI 28.93 kg/m²       Physical Exam    General : Alert, awake, no acute distress  Neck : Supple, no carotid bruit, no jugular venous distention  CVS : Regular rate and rhythm, no murmur, rubs or gallops  Lungs: Clear to auscultation bilaterally, no crackles or rhonchi  Abdomen: Soft, nontender, bowel sounds heard in all 4 quadrants  Extremities: Warm, well-perfused, no pedal edema    Result Review :     The following data was reviewed by: Yeison Ibarra MD on 01/09/2023:    CMP    CMP 5/24/22 11/30/22   Glucose 134 (A) 104 (A)   BUN 10 9   Creatinine 0.98 1.03   eGFR 86.1 81.1   Sodium 140 140   Potassium 4.1 4.9   Chloride 105 105   Calcium 9.3 9.3   Total Protein 6.8 6.7   Albumin 4.50 4.50   Globulin 2.3 2.2   Total Bilirubin 0.4 0.5   Alkaline Phosphatase 66 61   AST (SGOT) 20 22   ALT (SGPT) 26 30   Albumin/Globulin Ratio 2.0 2.0   BUN/Creatinine Ratio 10.2 8.7   Anion Gap 10.6 12.0   (A) Abnormal value       Comments are available for some flowsheets but are not being displayed.           CBC    CBC 5/24/22 11/30/22   WBC 6.48 5.28   RBC 5.40 6.03 (A)   Hemoglobin 17.4 18.6 (A)   Hematocrit 50.9 55.2 (A)   MCV 94.3 91.5   MCH 32.2 30.8   MCHC 34.2 33.7   RDW 14.5 12.9   Platelets 205 184   (A) Abnormal value            TSH    TSH 5/24/22 11/30/22   TSH 1.950 2.130           Lipid Panel    Lipid Panel 5/24/22 11/30/22   Total Cholesterol 128 119   Triglycerides 193 (A) 178 (A)   HDL Cholesterol 29 (A) 31 (A)   VLDL Cholesterol 32 30   LDL Cholesterol  67 58   LDL/HDL Ratio 2.08 1.69   (A)  Abnormal value                 Data reviewed: Cardiology studies        Results for orders placed in visit on 07/06/21    Adult Transthoracic Echo Complete w/ Color, Spectral and Contrast if necessary per protocol    Interpretation Summary  · Normal left ventricle systolic function with a calculated LV ejection fraction of 64%  · Left ventricular diastolic function is consistent with (grade I) impaired relaxation.  · There are no hemodynamically significant valvular abnormalities.       SPECT stress test done on 12/10/2020 showed     1.  No evidence of reversible ischemia or myocardial infarction.  Small  apical perfusion defect noted with no reversibility, which is likely a  normal variant.    2.  Normal left ventricular systolic function with ejection fraction of 65%  with normal wall motion.    3.  No chest pain during regadenoson infusion.    4.  No ischemic electrocardiogram changes noted.             Assessment and Plan        Diagnoses and all orders for this visit:    1. Coronary artery calcification (Primary)  Assessment & Plan:  Previous SPECT stress test did not show any reversible ischemia.  He currently has no angina-like symptoms.  LV systolic function is preserved.  Continue aspirin, statin.  No further cardiac testing needed at this time.  Patient is advised to call us back for exertional chest pain or discomfort or any new symptoms.      2. Essential hypertension  Assessment & Plan:  Blood pressure is very well controlled.  Continue amlodipine and atenolol at the current dose.      3. Mixed hyperlipidemia  Assessment & Plan:  LDL is 58, at goal.  Continue rosuvastatin and Zetia.  We will repeat lipid panel before next visit.              Follow Up     Return in about 1 year (around 1/9/2024) for Next scheduled follow up.    Patient was given instructions and counseling regarding his condition or for health maintenance advice. Please see specific information pulled into the AVS if appropriate.

## 2023-01-09 NOTE — ASSESSMENT & PLAN NOTE
LDL is 58, at goal.  Continue rosuvastatin and Zetia.  We will repeat lipid panel before next visit.

## 2023-01-09 NOTE — ASSESSMENT & PLAN NOTE
Previous SPECT stress test did not show any reversible ischemia.  He currently has no angina-like symptoms.  LV systolic function is preserved.  Continue aspirin, statin.  No further cardiac testing needed at this time.  Patient is advised to call us back for exertional chest pain or discomfort or any new symptoms.

## 2023-01-10 DIAGNOSIS — R71.8 ELEVATED RED BLOOD CELL COUNT: Primary | ICD-10-CM

## 2023-01-10 DIAGNOSIS — D58.2 ELEVATED HEMOGLOBIN: ICD-10-CM

## 2023-02-02 ENCOUNTER — OFFICE VISIT (OUTPATIENT)
Dept: ONCOLOGY | Facility: HOSPITAL | Age: 63
End: 2023-02-02
Payer: COMMERCIAL

## 2023-02-02 ENCOUNTER — LAB (OUTPATIENT)
Dept: ONCOLOGY | Facility: HOSPITAL | Age: 63
End: 2023-02-02
Payer: COMMERCIAL

## 2023-02-02 ENCOUNTER — TELEPHONE (OUTPATIENT)
Dept: ONCOLOGY | Facility: HOSPITAL | Age: 63
End: 2023-02-02

## 2023-02-02 VITALS
OXYGEN SATURATION: 97 % | RESPIRATION RATE: 18 BRPM | BODY MASS INDEX: 29.1 KG/M2 | DIASTOLIC BLOOD PRESSURE: 68 MMHG | TEMPERATURE: 97.3 F | HEIGHT: 70 IN | SYSTOLIC BLOOD PRESSURE: 123 MMHG | WEIGHT: 203.26 LBS | HEART RATE: 80 BPM

## 2023-02-02 DIAGNOSIS — D75.1 POLYCYTHEMIA: ICD-10-CM

## 2023-02-02 DIAGNOSIS — N52.9 ERECTILE DYSFUNCTION, UNSPECIFIED ERECTILE DYSFUNCTION TYPE: ICD-10-CM

## 2023-02-02 DIAGNOSIS — D75.1 POLYCYTHEMIA SECONDARY TO SMOKING: Primary | ICD-10-CM

## 2023-02-02 LAB
BASOPHILS # BLD AUTO: 0.04 10*3/MM3 (ref 0–0.2)
BASOPHILS NFR BLD AUTO: 0.7 % (ref 0–1.5)
DEPRECATED RDW RBC AUTO: 44.1 FL (ref 37–54)
EOSINOPHIL # BLD AUTO: 0.38 10*3/MM3 (ref 0–0.4)
EOSINOPHIL # BLD MANUAL: 0.42 10*3/MM3 (ref 0–0.4)
EOSINOPHIL NFR BLD AUTO: 6.3 % (ref 0.3–6.2)
EOSINOPHIL NFR BLD MANUAL: 7 % (ref 0.3–6.2)
ERYTHROCYTE [DISTWIDTH] IN BLOOD BY AUTOMATED COUNT: 12.9 % (ref 12.3–15.4)
HCT VFR BLD AUTO: 50.2 % (ref 37.5–51)
HGB BLD-MCNC: 17.4 G/DL (ref 13–17.7)
IMM GRANULOCYTES # BLD AUTO: 0.02 10*3/MM3 (ref 0–0.05)
IMM GRANULOCYTES NFR BLD AUTO: 0.3 % (ref 0–0.5)
LARGE PLATELETS: ABNORMAL
LYMPHOCYTES # BLD AUTO: 1.16 10*3/MM3 (ref 0.7–3.1)
LYMPHOCYTES # BLD MANUAL: 0.73 10*3/MM3 (ref 0.7–3.1)
LYMPHOCYTES NFR BLD AUTO: 19.1 % (ref 19.6–45.3)
LYMPHOCYTES NFR BLD MANUAL: 7 % (ref 5–12)
MCH RBC QN AUTO: 31.5 PG (ref 26.6–33)
MCHC RBC AUTO-ENTMCNC: 34.7 G/DL (ref 31.5–35.7)
MCV RBC AUTO: 90.9 FL (ref 79–97)
MONOCYTES # BLD AUTO: 0.43 10*3/MM3 (ref 0.1–0.9)
MONOCYTES # BLD: 0.42 10*3/MM3 (ref 0.1–0.9)
MONOCYTES NFR BLD AUTO: 7.1 % (ref 5–12)
NEUTROPHILS # BLD AUTO: 4.49 10*3/MM3 (ref 1.7–7)
NEUTROPHILS NFR BLD AUTO: 4.04 10*3/MM3 (ref 1.7–7)
NEUTROPHILS NFR BLD AUTO: 66.5 % (ref 42.7–76)
NEUTROPHILS NFR BLD MANUAL: 72 % (ref 42.7–76)
NEUTS BAND NFR BLD MANUAL: 2 % (ref 0–5)
PATHOLOGY REVIEW: YES
PLATELET # BLD AUTO: 196 10*3/MM3 (ref 140–450)
PMV BLD AUTO: 11 FL (ref 6–12)
POLYCHROMASIA BLD QL SMEAR: ABNORMAL
RBC # BLD AUTO: 5.52 10*6/MM3 (ref 4.14–5.8)
SMALL PLATELETS BLD QL SMEAR: ADEQUATE
VARIANT LYMPHS NFR BLD MANUAL: 10 % (ref 19.6–45.3)
VARIANT LYMPHS NFR BLD MANUAL: 2 % (ref 0–5)
WBC MORPH BLD: NORMAL
WBC NRBC COR # BLD: 6.07 10*3/MM3 (ref 3.4–10.8)

## 2023-02-02 PROCEDURE — 36415 COLL VENOUS BLD VENIPUNCTURE: CPT

## 2023-02-02 PROCEDURE — 99214 OFFICE O/P EST MOD 30 MIN: CPT | Performed by: NURSE PRACTITIONER

## 2023-02-02 PROCEDURE — G0463 HOSPITAL OUTPT CLINIC VISIT: HCPCS | Performed by: NURSE PRACTITIONER

## 2023-02-02 PROCEDURE — 85025 COMPLETE CBC W/AUTO DIFF WBC: CPT

## 2023-02-02 NOTE — TELEPHONE ENCOUNTER
Advised Spoke with patient, advised that labs from most recent visit show improved H&H. Instructed to maintain tobacco cessation. Advised that since the labs are stable that Nilda will move the follow up appointment from 4 months to 6 months, he asked for the follow up to be on a Monday or Tuesday at the earliest time available, advised that I will let scheduling know and they will reach out to him with that follow up appointment. Instructed to maintain all follow up appointments and contact the office with any questions or concerns. Patient verbalized understanding of all information discussed.

## 2023-02-02 NOTE — TELEPHONE ENCOUNTER
----- Message from BRAN Bennett sent at 2/2/2023  1:06 PM EST -----  CBC shows H / H improved. He can follow up in 6 months instead of 4 months with repeat CBC.     Continue tobacco cessation.

## 2023-02-02 NOTE — PROGRESS NOTES
CBC shows H / H improved. He can follow up in 6 months instead of 4 months with repeat CBC.     Continue tobacco cessation.

## 2023-02-02 NOTE — PROGRESS NOTES
Chief Complaint/Reason for Referral:  Polycythemia     Margie Belcher, Margie Schreiber, BRAN      Reinier Moctezuma presents to [facility] for polycythemia.    Subjective  HPI  Mr. Reinier Moctezuma seen previously by Dr. Pedro back in August of 2021 for same diagnosis. He previously received  Several phlebotomies from 11/6/2018 through 4/23/2019. He tested negative for CHAKA V617F on 3/25/2016. EPO level was normal at 9.9 on 3/215/2016. He is previous smoker. Admits to recently smoking again, but has not stopped again. History of JEAN-PAUL and reports he is compliant with his CPAP nightly.     Recent lab work on 1/9/2023 and 11/30/2022 showed he was having elevated H/H of 18.6 / 55/2 and 18.7 / 53.9. Previously, he was normal in May 2022 with H/H of 17.4 / 50.9      Oncology/Hematology History    No history exists.       Review of Systems   Constitutional: Positive for fatigue (5/10). Negative for appetite change, diaphoresis, fever, unexpected weight gain and unexpected weight loss.   HENT: Negative for hearing loss, sore throat and voice change.    Eyes: Negative for blurred vision, double vision, pain, redness and visual disturbance.   Respiratory: Negative for cough, shortness of breath and wheezing.    Cardiovascular: Negative for chest pain, palpitations and leg swelling.   Endocrine: Negative for cold intolerance, heat intolerance, polydipsia and polyuria.   Genitourinary: Negative for decreased urine volume, difficulty urinating, frequency and urinary incontinence.   Musculoskeletal: Negative for arthralgias, back pain, joint swelling and myalgias.   Skin: Negative for color change, rash, skin lesions and wound.   Neurological: Negative for dizziness, seizures, numbness and headache.   Hematological: Negative for adenopathy. Does not bruise/bleed easily.   Psychiatric/Behavioral: Negative for depressed mood. The patient is not nervous/anxious.    All other systems reviewed  and are negative.      Current Outpatient Medications on File Prior to Visit   Medication Sig Dispense Refill   • amLODIPine (NORVASC) 10 MG tablet Take 1 tablet by mouth Daily. 90 tablet 1   • aspirin 81 MG oral suspension Take 81 mg by mouth Daily.     • atenolol (TENORMIN) 100 MG tablet Take 1 tablet by mouth Daily. 90 tablet 1   • cetirizine (zyrTEC) 10 MG tablet Take 1 tablet by mouth Daily. 90 tablet 1   • ezetimibe (ZETIA) 10 MG tablet Take 1 tablet by mouth Daily. 90 tablet 1   • lansoprazole (Prevacid) 15 MG capsule Take 1 capsule by mouth Daily. 90 capsule 1   • levothyroxine (SYNTHROID, LEVOTHROID) 50 MCG tablet Take 1 tablet by mouth Daily. 90 tablet 1   • naproxen (NAPROSYN) 500 MG tablet Take 1 tablet by mouth 2 (Two) Times a Day As Needed for Pain. 36 tablet 1   • rosuvastatin (CRESTOR) 40 MG tablet Take 1 tablet by mouth every night at bedtime. 90 tablet 1   • sildenafil (Viagra) 100 MG tablet Take 1 tablet by mouth Daily As Needed for Erectile Dysfunction. 30 tablet 2   • vitamin D (ERGOCALCIFEROL) 1.25 MG (64868 UT) capsule capsule Take 1 capsule by mouth 1 (One) Time Per Week. 13 capsule 1   • albuterol sulfate  (90 Base) MCG/ACT inhaler Inhale 2 puffs Every 4 (Four) Hours As Needed for Wheezing or Shortness of Air for up to 90 days. 54 g 3   • umeclidinium-vilanterol (ANORO ELLIPTA) 62.5-25 MCG/INH aerosol powder  inhaler Inhale 1 puff Daily for 30 days. 60 each 11     No current facility-administered medications on file prior to visit.       No Known Allergies  Past Medical History:   Diagnosis Date   • Allergic rhinitis due to allergen 03/01/2019   • Arthritis    • COPD (chronic obstructive pulmonary disease) (HCC)    • Erectile dysfunction 10/16/2015   • Esophageal reflux disease    • Essential hypertension 08/04/2017   • GERD (gastroesophageal reflux disease) 03/01/2019   • HLD (hyperlipidemia)    • Hypothyroidism 03/17/2016   • Limb swelling    • Nicotine dependence 08/04/2017   •  Peyronie's disease    • Polycythemia secondary to smoking    • Prostatitis    • Seasonal allergies    • Sinus trouble    • SOB (shortness of breath)    • Tobacco use disorder 10/16/2015   • Vitamin D deficiency 2021   • Wrist sprain 2015     Past Surgical History:   Procedure Laterality Date   • COLONOSCOPY     • CYST REMOVAL     • CYSTOSCOPY     • HEEL SPUR SURGERY     • NOSE SURGERY      DR ZAMBRANO   • SHOULDER SURGERY     • VASECTOMY       Social History     Socioeconomic History   • Marital status:    Tobacco Use   • Smoking status: Former     Types: Cigars     Quit date:      Years since quittin.0     Passive exposure: Never   • Smokeless tobacco: Never   Vaping Use   • Vaping Use: Never used   Substance and Sexual Activity   • Alcohol use: Yes     Alcohol/week: 3.0 standard drinks     Types: 3 Cans of beer per week     Comment: 4-5 BEERS A DAY; DRINKING 21-30 YEARS   • Drug use: Never   • Sexual activity: Yes     Partners: Female     Birth control/protection: None     Family History   Problem Relation Age of Onset   • Aneurysm Mother    • Heart disease Father    • Cancer Father    • Nephrolithiasis Father    • Lung cancer Father    • Cancer Brother    • Colon cancer Brother 65   • Liver cancer Brother 65   • Diabetes Maternal Grandmother    • Diabetes Other      Immunization History   Administered Date(s) Administered   • FluLaval/Fluzone >6mos 2021, 2022   • Fluzone Quad >6mos (Multi-dose) 10/22/2020   • Hepatitis A 2019   • Influenza, Unspecified 2022   • Pneumococcal Polysaccharide (PPSV23) 2021       Tobacco Use: Medium Risk   • Smoking Tobacco Use: Former   • Smokeless Tobacco Use: Never   • Passive Exposure: Never       Objective     Physical Exam  Vitals and nursing note reviewed.   Constitutional:       Appearance: Normal appearance. He is obese.   HENT:      Nose: Nose normal.      Mouth/Throat:      Mouth: Mucous membranes are moist.  "  Eyes:      Pupils: Pupils are equal, round, and reactive to light.   Cardiovascular:      Rate and Rhythm: Normal rate and regular rhythm.      Pulses: Normal pulses.      Heart sounds: Normal heart sounds.   Pulmonary:      Effort: Pulmonary effort is normal.      Breath sounds: Normal breath sounds.   Abdominal:      General: Bowel sounds are normal.      Palpations: Abdomen is soft.   Musculoskeletal:         General: Normal range of motion.      Cervical back: Normal range of motion.   Skin:     General: Skin is warm and dry.      Capillary Refill: Capillary refill takes less than 2 seconds.   Neurological:      General: No focal deficit present.      Mental Status: He is alert and oriented to person, place, and time.   Psychiatric:         Mood and Affect: Mood normal.         Behavior: Behavior normal.         Thought Content: Thought content normal.         Judgment: Judgment normal.         Vitals:    02/02/23 1057   BP: 123/68   Pulse: 80   Resp: 18   Temp: 97.3 °F (36.3 °C)   SpO2: 97%   Weight: 92.2 kg (203 lb 4.2 oz)   Height: 177.8 cm (70\")   PainSc: 0-No pain       Wt Readings from Last 3 Encounters:   02/02/23 92.2 kg (203 lb 4.2 oz)   01/09/23 91.4 kg (201 lb 9.6 oz)   11/30/22 86.2 kg (190 lb)        ECOG score: 0         ECOG: (0) Fully Active - Able to Carry On All Pre-disease Performance Without Restriction  Fall Risk Assessment was completed, and patient is at low risk for falls.  PHQ-9 Total Score: 0       The patient is  experiencing fatigue. Fatigue score: 5    PT/OT Functional Screening: PT fx screen: No needs identified  Speech Functional Screening: Speech fx screen: No needs identified  Rehab to be ordered: Rehab to be ordered: No needs identified        Result Review :  The following data was reviewed by: BRAN Mukherjee on 02/02/2023:  Lab Results   Component Value Date    HGB 17.4 02/02/2023    HCT 50.2 02/02/2023    MCV 90.9 02/02/2023     02/02/2023    WBC 6.07 " 02/02/2023    NEUTROABS 4.04 02/02/2023    LYMPHSABS 1.16 02/02/2023    MONOSABS 0.43 02/02/2023    EOSABS 0.38 02/02/2023    BASOSABS 0.04 02/02/2023     Lab Results   Component Value Date    GLUCOSE 104 (H) 11/30/2022    BUN 9 11/30/2022    CREATININE 1.03 11/30/2022     11/30/2022    K 4.9 11/30/2022     11/30/2022    CO2 23.0 11/30/2022    CALCIUM 9.3 11/30/2022    PROTEINTOT 6.7 11/30/2022    ALBUMIN 4.50 11/30/2022    BILITOT 0.5 11/30/2022    ALKPHOS 61 11/30/2022    AST 22 11/30/2022    ALT 30 11/30/2022       CT Chest Low Dose Cancer Screening WO    Result Date: 11/7/2022    1. No evidence for acute intrathoracic abnormality.  No suspicious pulmonary nodules. 2. Ancillary findings as described above.      LASHAY STANLEY MD       Electronically Signed and Approved By: LASHAY STANLEY MD on 11/07/2022 at 8:40                    Assessment and Plan:  Diagnoses and all orders for this visit:    1. Polycythemia secondary to smoking (Primary)  -     CBC & Differential; Future    2. Polycythemia  -     CBC & Differential; Future  -     Peripheral Blood Smear; Future    Increase recently in his H/H likely due to his recent smoking he has done for the last 2 months. He has since stopped smoking. He is compliant with his CPAP use for his JEAN-PAUL. Denies any use of testosterone supplements or replacement.     Will recheck CBC today along with peripheral smear.   H/H has improved and decreased down to 17.4. / 50.2 near his baseline. We discussed if still elevated then would see him in 4 months but if normal then would follow up in 6 months. Weight is stable. Denies any cardiac anomalies or cardiac disease.     Continue to stay quit on smoking. Recent low dose CT screening was reviewed and no suspicious pulmonary nodules or disease.     If he continues to have polycythemia despite tobacco cessation, then would complete testing for MPL, Garrett-R, BCR abl mutation as this was never done when he was tested in 2016.  Negative for CHAKA V617F mutation and normal EPO level on 3/25/2016.    I spent 30  minutes caring for Reinier on this date of service. This time includes time spent by me in the following activities:preparing for the visit, reviewing tests, obtaining and/or reviewing a separately obtained history, performing a medically appropriate examination and/or evaluation , counseling and educating the patient/family/caregiver, ordering medications, tests, or procedures, referring and communicating with other health care professionals , documenting information in the medical record and independently interpreting results and communicating that information with the patient/family/caregiver    Patient Follow Up: 6 months with MD    Patient was given instructions and counseling regarding his condition or for health maintenance advice. Please see specific information pulled into the AVS if appropriate.     Lindsey Diaz, APRN    2/2/2023    .tob

## 2023-02-06 LAB
CYTO UR: NORMAL
LAB AP CASE REPORT: NORMAL
LAB AP CLINICAL INFORMATION: NORMAL
PATH REPORT.FINAL DX SPEC: NORMAL
PATH REPORT.GROSS SPEC: NORMAL

## 2023-02-07 RX ORDER — SILDENAFIL 100 MG/1
100 TABLET, FILM COATED ORAL DAILY PRN
Qty: 30 TABLET | Refills: 2 | Status: SHIPPED | OUTPATIENT
Start: 2023-02-07 | End: 2023-03-20 | Stop reason: SDUPTHER

## 2023-02-13 ENCOUNTER — TRANSCRIBE ORDERS (OUTPATIENT)
Dept: VASCULAR SURGERY | Facility: HOSPITAL | Age: 63
End: 2023-02-13
Payer: COMMERCIAL

## 2023-02-13 DIAGNOSIS — I71.43 INFRARENAL ABDOMINAL AORTIC ANEURYSM (AAA) WITHOUT RUPTURE: Primary | ICD-10-CM

## 2023-03-20 DIAGNOSIS — N52.9 ERECTILE DYSFUNCTION, UNSPECIFIED ERECTILE DYSFUNCTION TYPE: ICD-10-CM

## 2023-03-20 NOTE — TELEPHONE ENCOUNTER
Caller: Reinier Moctezuma    Relationship: Self    Best call back number: [phone]    Requested Prescriptions:   Requested Prescriptions     Pending Prescriptions Disp Refills   • sildenafil (Viagra) 100 MG tablet 30 tablet 2     Sig: Take 1 tablet by mouth Daily As Needed for Erectile Dysfunction.        Pharmacy where request should be sent:   [address] [place], [place] [address] [address]     Does the patient have less than a 3 day supply:  [x] Yes  [] No

## 2023-03-21 DIAGNOSIS — N52.9 ERECTILE DYSFUNCTION, UNSPECIFIED ERECTILE DYSFUNCTION TYPE: ICD-10-CM

## 2023-03-21 RX ORDER — SILDENAFIL 100 MG/1
100 TABLET, FILM COATED ORAL DAILY PRN
Qty: 30 TABLET | Refills: 2 | Status: SHIPPED | OUTPATIENT
Start: 2023-03-21 | End: 2023-03-21 | Stop reason: SDUPTHER

## 2023-03-21 RX ORDER — SILDENAFIL 100 MG/1
100 TABLET, FILM COATED ORAL DAILY PRN
Qty: 30 TABLET | Refills: 2 | Status: SHIPPED | OUTPATIENT
Start: 2023-03-21

## 2023-04-04 ENCOUNTER — HOSPITAL ENCOUNTER (OUTPATIENT)
Dept: CARDIOLOGY | Facility: HOSPITAL | Age: 63
Discharge: HOME | End: 2023-04-04
Payer: COMMERCIAL

## 2023-04-04 ENCOUNTER — OFFICE VISIT (OUTPATIENT)
Dept: VASCULAR SURGERY | Facility: HOSPITAL | Age: 63
End: 2023-04-04
Payer: COMMERCIAL

## 2023-04-04 VITALS
HEART RATE: 88 BPM | TEMPERATURE: 98.8 F | OXYGEN SATURATION: 97 % | DIASTOLIC BLOOD PRESSURE: 82 MMHG | SYSTOLIC BLOOD PRESSURE: 138 MMHG | RESPIRATION RATE: 18 BRPM

## 2023-04-04 DIAGNOSIS — I71.43 INFRARENAL ABDOMINAL AORTIC ANEURYSM (AAA) WITHOUT RUPTURE: Primary | ICD-10-CM

## 2023-04-04 DIAGNOSIS — I71.43 INFRARENAL ABDOMINAL AORTIC ANEURYSM (AAA) WITHOUT RUPTURE: ICD-10-CM

## 2023-04-04 LAB
ABDOMINAL DIST AORTA AP: 2.6 CM
ABDOMINAL DIST AORTA TRANS: 2.3 CM
ABDOMINAL LT COM ILIAC AP: 0.8 CM
ABDOMINAL LT COM ILIAC TRANS: 0.9 CM
ABDOMINAL MID AORTA AP: 1.9 CM
ABDOMINAL MID AORTA TRANS: 2.2 CM
ABDOMINAL PROX AORTA AP: 2.5 CM
ABDOMINAL PROX AORTA TRANS: 2.3 CM
ABDOMINAL RT COM ILIAC AP: 0.8 CM
ABDOMINAL RT COM ILIAC TRANS: 0.9 CM
MAXIMAL PREDICTED HEART RATE: 156 BPM
STRESS TARGET HR: 133 BPM

## 2023-04-04 PROCEDURE — 99212 OFFICE O/P EST SF 10 MIN: CPT | Performed by: NURSE PRACTITIONER

## 2023-04-04 PROCEDURE — 93978 VASCULAR STUDY: CPT | Performed by: SURGERY

## 2023-04-04 PROCEDURE — 93978 VASCULAR STUDY: CPT

## 2023-04-04 NOTE — PROGRESS NOTES
[facility] Office Follow Up Note     Date of Encounter: 04/04/2023     MRN Number: [id number]  Name: Reinier Moctezuma  Phone Number: [phone]     Referred By: No ref. provider found  PCP: Margie Belcher APRN    Chief Complaint:    Chief Complaint   Patient presents with   • Aortic Aneurysm     PATIENT IS HERE AS AAN ANNUAL VISIT WITH ABDOMINAL STUDIES.        Subjective      History of Present Illness:    Reinier Moctezuma is a 62 y.o. male presents for annual follow-up for abdominal aortic aneurysm with a duplex performed today.  He is doing well denies any abdominal or back pain.  He does still occasionally smoke.  He does take a statin medication daily.    Review of Systems:  ROS  Review of Systems   Constitutional: Negative.   HENT: Negative.    Cardiovascular: Negative.    Respiratory: Negative.    Skin: Negative.    Musculoskeletal: Negative.    Gastrointestinal: Negative.    Neurological: Negative.    Psychiatric/Behavioral: Negative.      I have reviewed the following portions of the patient's history: allergies, current medications, past family history, past medical history, past social history, past surgical history and problem list and confirm it's accurate.    Allergies:  No Known Allergies    Medications:      Current Outpatient Medications:   •  amLODIPine (NORVASC) 10 MG tablet, Take 1 tablet by mouth Daily., Disp: 90 tablet, Rfl: 1  •  ammonium lactate (AMLACTIN) 12 % cream, Apply daily after bathing to arms/legs, Disp: 140 g, Rfl: 11  •  aspirin 81 MG oral suspension, Take 81 mL by mouth Daily., Disp: , Rfl:   •  atenolol (TENORMIN) 100 MG tablet, Take 1 tablet by mouth Daily., Disp: 90 tablet, Rfl: 1  •  cetirizine (zyrTEC) 10 MG tablet, Take 1 tablet by mouth Daily., Disp: 90 tablet, Rfl: 1  •  ezetimibe (ZETIA) 10 MG tablet, Take 1 tablet by mouth Daily., Disp: 90 tablet, Rfl: 1  •  lansoprazole (Prevacid) 15 MG capsule, Take 1 capsule by mouth Daily., Disp: 90  capsule, Rfl: 1  •  levothyroxine (SYNTHROID, LEVOTHROID) 50 MCG tablet, Take 1 tablet by mouth Daily., Disp: 90 tablet, Rfl: 1  •  naproxen (NAPROSYN) 500 MG tablet, Take 1 tablet by mouth 2 (Two) Times a Day As Needed for Pain., Disp: 36 tablet, Rfl: 1  •  rosuvastatin (CRESTOR) 40 MG tablet, Take 1 tablet by mouth every night at bedtime., Disp: 90 tablet, Rfl: 1  •  sildenafil (Viagra) 100 MG tablet, Take 1 tablet by mouth Daily As Needed for Erectile Dysfunction., Disp: 30 tablet, Rfl: 2  •  vitamin D (ERGOCALCIFEROL) 1.25 MG (67564 UT) capsule capsule, Take 1 capsule by mouth 1 (One) Time Per Week., Disp: 13 capsule, Rfl: 1  •  albuterol sulfate  (90 Base) MCG/ACT inhaler, Inhale 2 puffs Every 4 (Four) Hours As Needed for Wheezing or Shortness of Air for up to 90 days., Disp: 54 g, Rfl: 3  •  umeclidinium-vilanterol (ANORO ELLIPTA) 62.5-25 MCG/INH aerosol powder  inhaler, Inhale 1 puff Daily for 30 days., Disp: 60 each, Rfl: 11      Objective     Physical Exam:  Vitals:    04/04/23 0834   BP: 138/82   BP Location: Right arm   Patient Position: Sitting   Cuff Size: Large Adult   Pulse: 88   Resp: 18   Temp: 98.8 °F (37.1 °C)   TempSrc: Temporal   SpO2: 97%   PainSc: 0-No pain      There is no height or weight on file to calculate BMI.    Physical Exam  Physical Exam  Constitutional:       Appearance: Normal appearance.   HENT:      Head: Normocephalic.   Cardiovascular:      Rate and Rhythm: Normal rate.      Pulses: Normal pulses.   Pulmonary:      Effort: Pulmonary effort is normal.   Musculoskeletal:         General: Normal range of motion.      Cervical back: Normal range of motion.   Skin:     General: Skin is warm and dry.      Capillary Refill: Capillary refill takes less than 2 seconds.   Neurological:      General: No focal deficit present.      Mental Status: Alert and oriented to person, place, and time.   Psychiatric:         Mood and Affect: Mood normal.         Behavior: Behavior  normal.  Imaging/Labs:  I have reviewed the preliminary results of the abdominal duplex performed today, 4/4/2023.  The duplex today reveals maximal diameter of 2.6 cm x 2.3 cm in the distal aorta.        Assessment / Plan      Assessment / Plan:  Diagnoses and all orders for this visit:    1. Infrarenal abdominal aortic aneurysm (AAA) without rupture (Primary)    Mr. Moctezuma has a small abdominal aortic aneurysm and the duplex performed today reveals no significant changes from the previous study performed in March 2022.     I recommend that given his family history we continue annual surveillance.  Follow-up in 1 year with a repeat aorta duplex.    I have answered all his questions and he is in agreement with the plan at this time.  Thank you for allowing me to participate in your patient's care.      Follow Up:   No follow-ups on file.   Or sooner for any further concerns or worsening sign and symptoms. If unable to reach us in the office please dial 911 or go to the nearest emergency department.      Doris SOTOMAYOR  [facility]

## 2023-04-27 DIAGNOSIS — E55.9 VITAMIN D DEFICIENCY: ICD-10-CM

## 2023-04-27 RX ORDER — ERGOCALCIFEROL 1.25 MG/1
50000 CAPSULE ORAL WEEKLY
Qty: 13 CAPSULE | Refills: 1 | Status: SHIPPED | OUTPATIENT
Start: 2023-04-27

## 2023-06-05 ENCOUNTER — OFFICE VISIT (OUTPATIENT)
Dept: FAMILY MEDICINE CLINIC | Facility: CLINIC | Age: 63
End: 2023-06-05
Payer: COMMERCIAL

## 2023-06-05 ENCOUNTER — LAB (OUTPATIENT)
Dept: LAB | Facility: HOSPITAL | Age: 63
End: 2023-06-05
Payer: COMMERCIAL

## 2023-06-05 VITALS
HEIGHT: 70 IN | OXYGEN SATURATION: 98 % | WEIGHT: 195 LBS | DIASTOLIC BLOOD PRESSURE: 66 MMHG | TEMPERATURE: 97.9 F | SYSTOLIC BLOOD PRESSURE: 104 MMHG | HEART RATE: 58 BPM | BODY MASS INDEX: 27.92 KG/M2

## 2023-06-05 DIAGNOSIS — Z72.0 TOBACCO ABUSE: ICD-10-CM

## 2023-06-05 DIAGNOSIS — I10 ESSENTIAL HYPERTENSION: Primary | ICD-10-CM

## 2023-06-05 DIAGNOSIS — Z12.5 PROSTATE CANCER SCREENING: ICD-10-CM

## 2023-06-05 DIAGNOSIS — E55.9 VITAMIN D DEFICIENCY: ICD-10-CM

## 2023-06-05 DIAGNOSIS — E78.5 HYPERLIPIDEMIA, UNSPECIFIED HYPERLIPIDEMIA TYPE: Chronic | ICD-10-CM

## 2023-06-05 DIAGNOSIS — E03.9 HYPOTHYROIDISM, UNSPECIFIED TYPE: Chronic | ICD-10-CM

## 2023-06-05 DIAGNOSIS — I10 ESSENTIAL HYPERTENSION: ICD-10-CM

## 2023-06-05 LAB
25(OH)D3 SERPL-MCNC: 45.5 NG/ML (ref 30–100)
ALBUMIN SERPL-MCNC: 4.7 G/DL (ref 3.5–5.2)
ALBUMIN/GLOB SERPL: 2 G/DL
ALP SERPL-CCNC: 69 U/L (ref 39–117)
ALT SERPL W P-5'-P-CCNC: 23 U/L (ref 1–41)
ANION GAP SERPL CALCULATED.3IONS-SCNC: 10 MMOL/L (ref 5–15)
AST SERPL-CCNC: 20 U/L (ref 1–40)
BASOPHILS # BLD AUTO: 0.08 10*3/MM3 (ref 0–0.2)
BASOPHILS NFR BLD AUTO: 1.4 % (ref 0–1.5)
BILIRUB SERPL-MCNC: 0.5 MG/DL (ref 0–1.2)
BUN SERPL-MCNC: 9 MG/DL (ref 8–23)
BUN/CREAT SERPL: 9.3 (ref 7–25)
CALCIUM SPEC-SCNC: 8.9 MG/DL (ref 8.6–10.5)
CHLORIDE SERPL-SCNC: 104 MMOL/L (ref 98–107)
CHOLEST SERPL-MCNC: 150 MG/DL (ref 0–200)
CO2 SERPL-SCNC: 25 MMOL/L (ref 22–29)
CREAT SERPL-MCNC: 0.97 MG/DL (ref 0.76–1.27)
DEPRECATED RDW RBC AUTO: 44 FL (ref 37–54)
EGFRCR SERPLBLD CKD-EPI 2021: 86.6 ML/MIN/1.73
EOSINOPHIL # BLD AUTO: 0.46 10*3/MM3 (ref 0–0.4)
EOSINOPHIL NFR BLD AUTO: 7.8 % (ref 0.3–6.2)
ERYTHROCYTE [DISTWIDTH] IN BLOOD BY AUTOMATED COUNT: 13.1 % (ref 12.3–15.4)
GLOBULIN UR ELPH-MCNC: 2.3 GM/DL
GLUCOSE SERPL-MCNC: 119 MG/DL (ref 65–99)
HCT VFR BLD AUTO: 54 % (ref 37.5–51)
HDLC SERPL-MCNC: 37 MG/DL (ref 40–60)
HGB BLD-MCNC: 18.7 G/DL (ref 13–17.7)
IMM GRANULOCYTES # BLD AUTO: 0.02 10*3/MM3 (ref 0–0.05)
IMM GRANULOCYTES NFR BLD AUTO: 0.3 % (ref 0–0.5)
LDLC SERPL CALC-MCNC: 75 MG/DL (ref 0–100)
LDLC/HDLC SERPL: 1.82 {RATIO}
LYMPHOCYTES # BLD AUTO: 0.89 10*3/MM3 (ref 0.7–3.1)
LYMPHOCYTES NFR BLD AUTO: 15.1 % (ref 19.6–45.3)
MCH RBC QN AUTO: 32.1 PG (ref 26.6–33)
MCHC RBC AUTO-ENTMCNC: 34.6 G/DL (ref 31.5–35.7)
MCV RBC AUTO: 92.8 FL (ref 79–97)
MONOCYTES # BLD AUTO: 0.54 10*3/MM3 (ref 0.1–0.9)
MONOCYTES NFR BLD AUTO: 9.2 % (ref 5–12)
NEUTROPHILS NFR BLD AUTO: 3.9 10*3/MM3 (ref 1.7–7)
NEUTROPHILS NFR BLD AUTO: 66.2 % (ref 42.7–76)
NRBC BLD AUTO-RTO: 0 /100 WBC (ref 0–0.2)
PLATELET # BLD AUTO: 166 10*3/MM3 (ref 140–450)
PMV BLD AUTO: 11.5 FL (ref 6–12)
POTASSIUM SERPL-SCNC: 4.4 MMOL/L (ref 3.5–5.2)
PROT SERPL-MCNC: 7 G/DL (ref 6–8.5)
PSA SERPL-MCNC: 0.96 NG/ML (ref 0–4)
RBC # BLD AUTO: 5.82 10*6/MM3 (ref 4.14–5.8)
SODIUM SERPL-SCNC: 139 MMOL/L (ref 136–145)
T4 FREE SERPL-MCNC: 1.21 NG/DL (ref 0.93–1.7)
TRIGL SERPL-MCNC: 228 MG/DL (ref 0–150)
VLDLC SERPL-MCNC: 38 MG/DL (ref 5–40)
WBC NRBC COR # BLD: 5.89 10*3/MM3 (ref 3.4–10.8)

## 2023-06-05 PROCEDURE — G0103 PSA SCREENING: HCPCS

## 2023-06-05 PROCEDURE — 80061 LIPID PANEL: CPT

## 2023-06-05 PROCEDURE — 99214 OFFICE O/P EST MOD 30 MIN: CPT | Performed by: NURSE PRACTITIONER

## 2023-06-05 PROCEDURE — 82306 VITAMIN D 25 HYDROXY: CPT

## 2023-06-05 PROCEDURE — 84480 ASSAY TRIIODOTHYRONINE (T3): CPT

## 2023-06-05 PROCEDURE — 84479 ASSAY OF THYROID (T3 OR T4): CPT

## 2023-06-05 PROCEDURE — 84439 ASSAY OF FREE THYROXINE: CPT

## 2023-06-05 PROCEDURE — 80050 GENERAL HEALTH PANEL: CPT

## 2023-06-05 PROCEDURE — 84436 ASSAY OF TOTAL THYROXINE: CPT

## 2023-06-05 PROCEDURE — 36415 COLL VENOUS BLD VENIPUNCTURE: CPT

## 2023-06-05 RX ORDER — LEVOTHYROXINE SODIUM 0.05 MG/1
50 TABLET ORAL DAILY
Qty: 90 TABLET | Refills: 1 | Status: SHIPPED | OUTPATIENT
Start: 2023-06-05

## 2023-06-05 RX ORDER — ALBUTEROL SULFATE 90 UG/1
2 AEROSOL, METERED RESPIRATORY (INHALATION) EVERY 4 HOURS PRN
Qty: 8.5 G | Refills: 1 | Status: SHIPPED | OUTPATIENT
Start: 2023-06-05

## 2023-06-05 RX ORDER — ROSUVASTATIN CALCIUM 40 MG/1
40 TABLET, COATED ORAL
Qty: 90 TABLET | Refills: 1 | Status: SHIPPED | OUTPATIENT
Start: 2023-06-05

## 2023-06-05 RX ORDER — AMLODIPINE BESYLATE 10 MG/1
10 TABLET ORAL DAILY
Qty: 90 TABLET | Refills: 1 | Status: SHIPPED | OUTPATIENT
Start: 2023-06-05

## 2023-06-05 RX ORDER — ATENOLOL 100 MG/1
100 TABLET ORAL DAILY
Qty: 90 TABLET | Refills: 1 | Status: SHIPPED | OUTPATIENT
Start: 2023-06-05

## 2023-06-05 RX ORDER — EZETIMIBE 10 MG/1
10 TABLET ORAL DAILY
Qty: 90 TABLET | Refills: 1 | Status: SHIPPED | OUTPATIENT
Start: 2023-06-05

## 2023-06-05 NOTE — PROGRESS NOTES
Chief Complaint  Follow-up (6 months), Hypertension, Hyperlipidemia, Hypothyroidism, and Heartburn    SUBJECTIVE  Reinier Moctezuma presents to [facility]    Hypertension:  Patient is taking Amlodipine, ASA, Atenolol, .  Patient's Blood Pressure in clinic today is 104/66.  Patient seldom monitors blood pressure at home.  Patient denies chest pain, shortness of air, headache, flushing, abnormal swelling in feet/ankles.    Hyperlipidemia:  Patient is taking Zetia, Rosuvastatin.  Patient denies nocturnal leg cramps, myalgias.  Patient attempts to maintain a diet low in fat and carbohydrates.    Hypothyroidism:  Patient takes Levothyroxine.  Patient states he takes medication first thing in the morning on an empty stomach with just a sip of water.  Patient denies weight gain/loss, fatigue, jitters, hair shedding, neck fullness, difficulty swallowing.    Gastroesophageal Reflux:  Patient is taking Prevacid, with good control of symptoms.  Patient does not need over the counter medications for breakthrough symptoms.  Patient tries to avoid trigger foods, eat frequent small meals, not lie down within 2 hours of eating, avoids NSAIDS medications and alcohol.      Vitamin D Deficiency:  Patient is taking Vitamin D2 weekly.    Elevated hemoglobin and hematocrit-patient was referred to hematology for chronically elevated hemoglobin and hematocrit.  He states he has seen them a few times but was told that the practice of phlebotomy was no longer indicated, despite him having this done in 2018 and 2019.  He states he is to follow-up with them August but he is not sure he needs to keep this appointment as he does not know what they are doing for him.      History of Present Illness  Past Medical History:   Diagnosis Date    Allergic rhinitis due to allergen 03/01/2019    Arthritis     COPD (chronic obstructive pulmonary disease)     Erectile dysfunction 10/16/2015    Esophageal reflux disease      Essential hypertension 08/04/2017    GERD (gastroesophageal reflux disease) 03/01/2019    HLD (hyperlipidemia)     Hypothyroidism 03/17/2016    Limb swelling     Nicotine dependence 08/04/2017    Peyronie's disease     Polycythemia secondary to smoking     Prostatitis     Seasonal allergies     Sinus trouble     SOB (shortness of breath)     Tobacco use disorder 10/16/2015    Vitamin D deficiency 05/25/2021    Wrist sprain 07/08/2015      Family History   Problem Relation Age of Onset    Aneurysm Mother     Heart disease Father     Cancer Father     Nephrolithiasis Father     Lung cancer Father     Cancer Brother     Colon cancer Brother 65    Liver cancer Brother 65    Diabetes Maternal Grandmother     Diabetes Other       Past Surgical History:   Procedure Laterality Date    COLONOSCOPY  2008    CYST REMOVAL      CYSTOSCOPY      HEEL SPUR SURGERY      NOSE SURGERY  2011    DR ZAMBRANO    SHOULDER SURGERY      VASECTOMY          Current Outpatient Medications:     albuterol sulfate  (90 Base) MCG/ACT inhaler, Inhale 2 puffs Every 4 (Four) Hours As Needed for Wheezing or Shortness of Air., Disp: 8 g, Rfl: 1    amLODIPine (NORVASC) 10 MG tablet, Take 1 tablet by mouth Daily., Disp: 90 tablet, Rfl: 1    ammonium lactate (AMLACTIN) 12 % cream, Apply daily after bathing to arms/legs, Disp: 140 g, Rfl: 11    aspirin 81 MG oral suspension, Take 81 mL by mouth Daily., Disp: , Rfl:     atenolol (TENORMIN) 100 MG tablet, Take 1 tablet by mouth Daily., Disp: 90 tablet, Rfl: 1    cetirizine (zyrTEC) 10 MG tablet, Take 1 tablet by mouth Daily., Disp: 90 tablet, Rfl: 1    ezetimibe (ZETIA) 10 MG tablet, Take 1 tablet by mouth Daily., Disp: 90 tablet, Rfl: 1    lansoprazole (PREVACID) 15 MG capsule, Take 1 capsule by mouth Daily., Disp: 90 capsule, Rfl: 1    levothyroxine (SYNTHROID, LEVOTHROID) 50 MCG tablet, Take 1 tablet by mouth Daily., Disp: 90 tablet, Rfl: 1    naproxen (NAPROSYN) 500 MG tablet, Take 1 tablet by mouth 2  "(Two) Times a Day As Needed for Pain., Disp: 36 tablet, Rfl: 1    rosuvastatin (CRESTOR) 40 MG tablet, Take 1 tablet by mouth every night at bedtime., Disp: 90 tablet, Rfl: 1    sildenafil (Viagra) 100 MG tablet, Take 1 tablet by mouth Daily As Needed for Erectile Dysfunction., Disp: 30 tablet, Rfl: 2    vitamin D (ERGOCALCIFEROL) 1.25 MG (88569 UT) capsule capsule, Take 1 capsule by mouth 1 (One) Time Per Week., Disp: 13 capsule, Rfl: 1    umeclidinium-vilanterol (ANORO ELLIPTA) 62.5-25 MCG/INH aerosol powder  inhaler, Inhale 1 puff Daily for 30 days., Disp: 60 each, Rfl: 11    OBJECTIVE  Vital Signs:   /66 (BP Location: Left arm, Patient Position: Sitting, Cuff Size: Large Adult)   Pulse 58   Temp 97.9 °F (36.6 °C) (Oral)   Ht 177.8 cm (70\")   Wt 88.5 kg (195 lb)   SpO2 98%   BMI 27.98 kg/m²    Estimated body mass index is 27.98 kg/m² as calculated from the following:    Height as of this encounter: 177.8 cm (70\").    Weight as of this encounter: 88.5 kg (195 lb).     Wt Readings from Last 3 Encounters:   06/05/23 88.5 kg (195 lb)   02/02/23 92.2 kg (203 lb 4.2 oz)   01/09/23 91.4 kg (201 lb 9.6 oz)     BP Readings from Last 3 Encounters:   06/05/23 104/66   04/04/23 138/82   02/02/23 123/68       Physical Exam  Vitals reviewed.   Constitutional:       Appearance: Normal appearance. He is well-developed.   HENT:      Head: Normocephalic and atraumatic.      Right Ear: External ear normal.      Left Ear: External ear normal.      Mouth/Throat:      Pharynx: No oropharyngeal exudate.   Eyes:      Conjunctiva/sclera: Conjunctivae normal.      Pupils: Pupils are equal, round, and reactive to light.   Neck:      Vascular: No carotid bruit.   Cardiovascular:      Rate and Rhythm: Normal rate and regular rhythm.      Pulses: Normal pulses.      Heart sounds: Normal heart sounds. No murmur heard.    No friction rub. No gallop.   Pulmonary:      Effort: Pulmonary effort is normal.      Breath sounds: Normal " breath sounds. No wheezing or rhonchi.   Skin:     General: Skin is warm and dry.   Neurological:      Mental Status: He is alert and oriented to person, place, and time.      Cranial Nerves: No cranial nerve deficit.   Psychiatric:         Mood and Affect: Mood and affect normal.         Behavior: Behavior normal.         Thought Content: Thought content normal.         Judgment: Judgment normal.        Result Review        No Images in the past 120 days found..      The above data has been reviewed by BRAN Pruitt 06/05/2023 07:43 EDT.          Patient Care Team:  Margie Belcher APRN as PCP - General (Family Medicine)  Reva Pedro MD as Consulting Physician (Hematology and Oncology)  Tej Mcnulty DO as Consulting Physician (Pulmonary Disease)    BMI is >= 25 and <30. (Overweight) The following options were offered after discussion;: weight loss educational material (shared in after visit summary)       ASSESSMENT & PLAN    Diagnoses and all orders for this visit:    1. Essential hypertension (Primary)  Comments:  BP well controlled, continue current medication.  Orders:  -     Comprehensive Metabolic Panel; Future  -     CBC & Differential; Future  -     amLODIPine (NORVASC) 10 MG tablet; Take 1 tablet by mouth Daily.  Dispense: 90 tablet; Refill: 1  -     atenolol (TENORMIN) 100 MG tablet; Take 1 tablet by mouth Daily.  Dispense: 90 tablet; Refill: 1    2. Hyperlipidemia, unspecified hyperlipidemia type  Comments:  stable on crestor 40mg nightly  Orders:  -     Lipid Panel; Future  -     ezetimibe (ZETIA) 10 MG tablet; Take 1 tablet by mouth Daily.  Dispense: 90 tablet; Refill: 1  -     rosuvastatin (CRESTOR) 40 MG tablet; Take 1 tablet by mouth every night at bedtime.  Dispense: 90 tablet; Refill: 1    3. Hypothyroidism, unspecified type  Comments:  Stable on synthroid 50mcg daily  Orders:  -     Thyroid Profile II; Future  -     T4, free; Future  -     levothyroxine (SYNTHROID,  LEVOTHROID) 50 MCG tablet; Take 1 tablet by mouth Daily.  Dispense: 90 tablet; Refill: 1    4. Prostate cancer screening  -     PSA Screen; Future    5. Vitamin D deficiency  -     Vitamin D 25 hydroxy; Future    6. Tobacco abuse  -     albuterol sulfate  (90 Base) MCG/ACT inhaler; Inhale 2 puffs Every 4 (Four) Hours As Needed for Wheezing or Shortness of Air.  Dispense: 8 g; Refill: 1     Reinier Moctezuma  reports that he quit smoking about 5 years ago. His smoking use included cigars. He has never been exposed to tobacco smoke. He has never used smokeless tobacco.. I have educated him on the risk of diseases from using tobacco products such as cancer, COPD, and heart disease.     I advised him to quit and he is not willing to quit.    I spent 10 minutes counseling the patient.           Tobacco Use: Medium Risk    Smoking Tobacco Use: Former    Smokeless Tobacco Use: Never    Passive Exposure: Never       Follow Up     Return in about 6 months (around 12/5/2023).      Patient was given instructions and counseling regarding his condition or for health maintenance advice. Please see specific information pulled into the AVS if appropriate.   I have reviewed information obtained and documented by others and I have confirmed the accuracy of this documented note.    BRAN Pruitt

## 2023-06-06 LAB
FT4I SERPL CALC-MCNC: 2 (ref 1.2–4.9)
T3 SERPL-MCNC: 127 NG/DL (ref 71–180)
T3RU NFR SERPL: 22 % (ref 24–39)
T4 SERPL-MCNC: 9 UG/DL (ref 4.5–12)
TSH SERPL DL<=0.005 MIU/L-ACNC: 2.5 UIU/ML (ref 0.45–4.5)

## 2023-06-07 DIAGNOSIS — R73.09 ELEVATED GLUCOSE: Primary | ICD-10-CM

## 2023-07-26 DIAGNOSIS — J30.2 SEASONAL ALLERGIC RHINITIS, UNSPECIFIED TRIGGER: ICD-10-CM

## 2023-07-26 RX ORDER — CETIRIZINE HYDROCHLORIDE 10 MG/1
10 TABLET ORAL DAILY
Qty: 90 TABLET | Refills: 1 | Status: SHIPPED | OUTPATIENT
Start: 2023-07-26

## 2023-08-14 ENCOUNTER — LAB (OUTPATIENT)
Dept: LAB | Facility: HOSPITAL | Age: 63
End: 2023-08-14
Payer: COMMERCIAL

## 2023-08-14 DIAGNOSIS — D75.1 POLYCYTHEMIA SECONDARY TO SMOKING: ICD-10-CM

## 2023-08-14 LAB
BASOPHILS # BLD AUTO: 0.07 10*3/MM3 (ref 0–0.2)
BASOPHILS NFR BLD AUTO: 1.3 % (ref 0–1.5)
DEPRECATED RDW RBC AUTO: 45 FL (ref 37–54)
EOSINOPHIL # BLD AUTO: 0.36 10*3/MM3 (ref 0–0.4)
EOSINOPHIL NFR BLD AUTO: 6.5 % (ref 0.3–6.2)
ERYTHROCYTE [DISTWIDTH] IN BLOOD BY AUTOMATED COUNT: 13.2 % (ref 12.3–15.4)
HCT VFR BLD AUTO: 50.2 % (ref 37.5–51)
HGB BLD-MCNC: 17.4 G/DL (ref 13–17.7)
IMM GRANULOCYTES # BLD AUTO: 0.02 10*3/MM3 (ref 0–0.05)
IMM GRANULOCYTES NFR BLD AUTO: 0.4 % (ref 0–0.5)
LYMPHOCYTES # BLD AUTO: 1.12 10*3/MM3 (ref 0.7–3.1)
LYMPHOCYTES NFR BLD AUTO: 20.2 % (ref 19.6–45.3)
MCH RBC QN AUTO: 31.8 PG (ref 26.6–33)
MCHC RBC AUTO-ENTMCNC: 34.7 G/DL (ref 31.5–35.7)
MCV RBC AUTO: 91.6 FL (ref 79–97)
MONOCYTES # BLD AUTO: 0.68 10*3/MM3 (ref 0.1–0.9)
MONOCYTES NFR BLD AUTO: 12.3 % (ref 5–12)
NEUTROPHILS NFR BLD AUTO: 3.29 10*3/MM3 (ref 1.7–7)
NEUTROPHILS NFR BLD AUTO: 59.3 % (ref 42.7–76)
NRBC BLD AUTO-RTO: 0 /100 WBC (ref 0–0.2)
PLATELET # BLD AUTO: 174 10*3/MM3 (ref 140–450)
PMV BLD AUTO: 11.7 FL (ref 6–12)
RBC # BLD AUTO: 5.48 10*6/MM3 (ref 4.14–5.8)
WBC NRBC COR # BLD: 5.54 10*3/MM3 (ref 3.4–10.8)

## 2023-08-14 PROCEDURE — 36415 COLL VENOUS BLD VENIPUNCTURE: CPT

## 2023-08-14 PROCEDURE — 85025 COMPLETE CBC W/AUTO DIFF WBC: CPT

## 2023-08-15 ENCOUNTER — OFFICE VISIT (OUTPATIENT)
Dept: ONCOLOGY | Facility: HOSPITAL | Age: 63
End: 2023-08-15
Payer: COMMERCIAL

## 2023-08-15 VITALS
DIASTOLIC BLOOD PRESSURE: 80 MMHG | SYSTOLIC BLOOD PRESSURE: 111 MMHG | HEART RATE: 51 BPM | WEIGHT: 192.68 LBS | BODY MASS INDEX: 27.65 KG/M2 | TEMPERATURE: 97.4 F | RESPIRATION RATE: 16 BRPM | OXYGEN SATURATION: 99 %

## 2023-08-15 DIAGNOSIS — D75.1 POLYCYTHEMIA SECONDARY TO SMOKING: Primary | ICD-10-CM

## 2023-08-15 PROCEDURE — G0463 HOSPITAL OUTPT CLINIC VISIT: HCPCS

## 2023-08-15 NOTE — PROGRESS NOTES
Chief Complaint  Follow-up      Margie Belcher APRN Subjective          Reinier Moctezuma presents to [facility] for follow-up of his elevated reticulocyte count.  This is felt to be secondary.  Phlebotomy does not improve his clinical symptoms.  He notes a little fatigue but his energy is better.  He has now stopped smoking.  He is using his CPAP consistently.    Review of Systems   Constitutional:  Positive for fatigue. Negative for appetite change, diaphoresis, fever, unexpected weight gain and unexpected weight loss.   HENT:  Negative for hearing loss, sore throat and voice change.    Eyes:  Negative for blurred vision, double vision, pain, redness and visual disturbance.   Respiratory:  Negative for cough, shortness of breath and wheezing.    Cardiovascular:  Negative for chest pain, palpitations and leg swelling.   Endocrine: Negative for cold intolerance, heat intolerance, polydipsia and polyuria.   Genitourinary:  Negative for decreased urine volume, difficulty urinating, frequency and urinary incontinence.   Musculoskeletal:  Negative for arthralgias, back pain, joint swelling and myalgias.   Skin:  Negative for color change, rash, skin lesions and wound.   Neurological:  Negative for dizziness, seizures, numbness and headache.   Hematological:  Negative for adenopathy. Does not bruise/bleed easily.   Psychiatric/Behavioral:  Negative for depressed mood. The patient is not nervous/anxious.    Current Outpatient Medications on File Prior to Visit   Medication Sig Dispense Refill    albuterol sulfate  (90 Base) MCG/ACT inhaler Inhale 2 puffs Every 4 (Four) Hours As Needed for Wheezing or Shortness of Air. 8.5 g 1    amLODIPine (NORVASC) 10 MG tablet Take 1 tablet by mouth Daily. 90 tablet 1    ammonium lactate (AMLACTIN) 12 % cream Apply daily after bathing to arms/legs 140 g 11    aspirin 81 MG oral suspension Take 81 mL by mouth Daily.      atenolol  (TENORMIN) 100 MG tablet Take 1 tablet by mouth Daily. 90 tablet 1    cetirizine (zyrTEC) 10 MG tablet Take 1 tablet by mouth Daily. 90 tablet 1    ezetimibe (ZETIA) 10 MG tablet Take 1 tablet by mouth Daily. 90 tablet 1    lansoprazole (PREVACID) 15 MG capsule Take 1 capsule by mouth Daily. 90 capsule 1    levothyroxine (SYNTHROID, LEVOTHROID) 50 MCG tablet Take 1 tablet by mouth Daily. 90 tablet 1    naproxen (NAPROSYN) 500 MG tablet Take 1 tablet by mouth 2 (Two) Times a Day As Needed for Pain. 36 tablet 1    rosuvastatin (CRESTOR) 40 MG tablet Take 1 tablet by mouth every night at bedtime. 90 tablet 1    sildenafil (Viagra) 100 MG tablet Take 1 tablet by mouth Daily As Needed for Erectile Dysfunction. 30 tablet 2    vitamin D (ERGOCALCIFEROL) 1.25 MG (17369 UT) capsule capsule Take 1 capsule by mouth 1 (One) Time Per Week. 13 capsule 1    umeclidinium-vilanterol (ANORO ELLIPTA) 62.5-25 MCG/INH aerosol powder  inhaler Inhale 1 puff Daily for 30 days. 60 each 11     No current facility-administered medications on file prior to visit.       No Known Allergies  Past Medical History:   Diagnosis Date    Allergic rhinitis due to allergen 03/01/2019    Arthritis     COPD (chronic obstructive pulmonary disease)     Erectile dysfunction 10/16/2015    Esophageal reflux disease     Essential hypertension 08/04/2017    GERD (gastroesophageal reflux disease) 03/01/2019    HLD (hyperlipidemia)     Hypothyroidism 03/17/2016    Limb swelling     Nicotine dependence 08/04/2017    Peyronie's disease     Polycythemia secondary to smoking     Prostatitis     Seasonal allergies     Sinus trouble     SOB (shortness of breath)     Tobacco use disorder 10/16/2015    Vitamin D deficiency 05/25/2021    Wrist sprain 07/08/2015     Past Surgical History:   Procedure Laterality Date    COLONOSCOPY  2008    CYST REMOVAL      CYSTOSCOPY      HEEL SPUR SURGERY      NOSE SURGERY  2011    DR ZAMBRANO    SHOULDER SURGERY      VASECTOMY       Social  History     Socioeconomic History    Marital status:    Tobacco Use    Smoking status: Former     Types: Cigars     Quit date: 2018     Years since quittin.6     Passive exposure: Never    Smokeless tobacco: Never   Vaping Use    Vaping Use: Never used   Substance and Sexual Activity    Alcohol use: Yes     Alcohol/week: 3.0 standard drinks     Types: 3 Cans of beer per week     Comment: 4-5 BEERS A DAY; DRINKING 21-30 YEARS    Drug use: Never    Sexual activity: Yes     Partners: Female     Birth control/protection: None     Family History   Problem Relation Age of Onset    Aneurysm Mother     Heart disease Father     Cancer Father     Nephrolithiasis Father     Lung cancer Father     Cancer Brother     Colon cancer Brother 65    Liver cancer Brother 65    Diabetes Maternal Grandmother     Diabetes Other        Objective   Physical Exam  Vitals reviewed. Exam conducted with a chaperone present.   Constitutional:       General: He is not in acute distress.     Appearance: Normal appearance.   Cardiovascular:      Rate and Rhythm: Normal rate and regular rhythm.      Heart sounds: Normal heart sounds. No murmur heard.    No gallop.   Pulmonary:      Effort: Pulmonary effort is normal.      Breath sounds: Normal breath sounds.   Neurological:      Mental Status: He is alert.   Psychiatric:         Mood and Affect: Mood normal.         Behavior: Behavior normal.       Vitals:    08/15/23 0755   BP: 111/80   Pulse: 51   Resp: 16   Temp: 97.4 øF (36.3 øC)   TempSrc: Temporal   SpO2: 99%   Weight: 87.4 kg (192 lb 10.9 oz)   PainSc: 0-No pain     ECOG score: 0         PHQ-9 Total Score:                      Result Review :   The following data was reviewed by: Isaias Pepper MD on 08/15/2023:  Lab Results   Component Value Date    HGB 17.4 2023    HCT 50.2 2023    MCV 91.6 2023     2023    WBC 5.54 2023    NEUTROABS 3.29 2023    LYMPHSABS 1.12 2023    MONOSABS  0.68 08/14/2023    EOSABS 0.36 08/14/2023    BASOSABS 0.07 08/14/2023     Lab Results   Component Value Date    GLUCOSE 119 (H) 06/05/2023    BUN 9 06/05/2023    CREATININE 0.97 06/05/2023     06/05/2023    K 4.4 06/05/2023     06/05/2023    CO2 25.0 06/05/2023    CALCIUM 8.9 06/05/2023    PROTEINTOT 7.0 06/05/2023    ALBUMIN 4.7 06/05/2023    BILITOT 0.5 06/05/2023    ALKPHOS 69 06/05/2023    AST 20 06/05/2023    ALT 23 06/05/2023     Lab Results   Component Value Date    FREET4 1.21 06/05/2023    TSH 2.500 06/05/2023     No results found for: IRON, LABIRON, TRANSFERRIN, TIBC  Lab Results   Component Value Date     07/17/2019     Lab Results   Component Value Date    PSA 0.958 06/05/2023          BCR/ABL is negative     Assessment and Plan    Diagnoses and all orders for this visit:    1. Polycythemia secondary to smoking (Primary)  Assessment & Plan:  Patient has stopped smoking and is using his CPAP consistently.  His hemoglobin hematocrit have now normalized.  No further intervention required as long as he remains abstinent from smoking and using CPAP appropriately.  I would recommend CBC at least yearly as part of her physical.  He does not require routine follow-up here although I am happy to see him back at any point in the future should additional questions or concerns arise    Orders:  -     CBC & Differential; Future            Patient Follow Up: As needed    Patient was given instructions and counseling regarding his condition or for health maintenance advice. Please see specific information pulled into the AVS if appropriate.     Isaias Pepper MD    8/15/2023

## 2023-08-15 NOTE — ASSESSMENT & PLAN NOTE
Patient has stopped smoking and is using his CPAP consistently.  His hemoglobin hematocrit have now normalized.  No further intervention required as long as he remains abstinent from smoking and using CPAP appropriately.  I would recommend CBC at least yearly as part of her physical.  He does not require routine follow-up here although I am happy to see him back at any point in the future should additional questions or concerns arise

## 2023-10-25 RX ORDER — MECOBALAMIN 5000 MCG
15 TABLET,DISINTEGRATING ORAL DAILY
Qty: 90 CAPSULE | Refills: 0 | Status: SHIPPED | OUTPATIENT
Start: 2023-10-25

## 2023-10-26 DIAGNOSIS — E55.9 VITAMIN D DEFICIENCY: ICD-10-CM

## 2023-10-30 RX ORDER — ERGOCALCIFEROL 1.25 MG/1
50000 CAPSULE ORAL WEEKLY
Qty: 13 CAPSULE | Refills: 1 | Status: SHIPPED | OUTPATIENT
Start: 2023-10-30

## 2023-11-07 ENCOUNTER — HOSPITAL ENCOUNTER (OUTPATIENT)
Dept: CT IMAGING | Facility: HOSPITAL | Age: 63
Discharge: HOME | End: 2023-11-07
Admitting: INTERNAL MEDICINE
Payer: COMMERCIAL

## 2023-11-07 DIAGNOSIS — Z87.891 PERSONAL HISTORY OF NICOTINE DEPENDENCE: ICD-10-CM

## 2023-11-07 PROCEDURE — 71271 CT THORAX LUNG CANCER SCR C-: CPT

## 2023-12-11 ENCOUNTER — OFFICE VISIT (OUTPATIENT)
Dept: PULMONOLOGY | Facility: CLINIC | Age: 63
End: 2023-12-11
Payer: COMMERCIAL

## 2023-12-11 ENCOUNTER — OFFICE VISIT (OUTPATIENT)
Dept: FAMILY MEDICINE CLINIC | Facility: CLINIC | Age: 63
End: 2023-12-11
Payer: COMMERCIAL

## 2023-12-11 ENCOUNTER — LAB (OUTPATIENT)
Dept: LAB | Facility: HOSPITAL | Age: 63
End: 2023-12-11
Payer: COMMERCIAL

## 2023-12-11 VITALS
HEIGHT: 70 IN | HEART RATE: 52 BPM | OXYGEN SATURATION: 95 % | WEIGHT: 202 LBS | DIASTOLIC BLOOD PRESSURE: 77 MMHG | TEMPERATURE: 98 F | BODY MASS INDEX: 28.92 KG/M2 | RESPIRATION RATE: 17 BRPM | SYSTOLIC BLOOD PRESSURE: 131 MMHG

## 2023-12-11 VITALS
DIASTOLIC BLOOD PRESSURE: 68 MMHG | SYSTOLIC BLOOD PRESSURE: 124 MMHG | BODY MASS INDEX: 28.72 KG/M2 | HEIGHT: 70 IN | HEART RATE: 53 BPM | TEMPERATURE: 98.1 F | OXYGEN SATURATION: 98 % | WEIGHT: 200.6 LBS

## 2023-12-11 DIAGNOSIS — Z12.11 COLON CANCER SCREENING: ICD-10-CM

## 2023-12-11 DIAGNOSIS — E78.2 MIXED HYPERLIPIDEMIA: ICD-10-CM

## 2023-12-11 DIAGNOSIS — F17.201 TOBACCO ABUSE, IN REMISSION: ICD-10-CM

## 2023-12-11 DIAGNOSIS — I10 ESSENTIAL HYPERTENSION: ICD-10-CM

## 2023-12-11 DIAGNOSIS — Z00.00 ANNUAL PHYSICAL EXAM: Primary | ICD-10-CM

## 2023-12-11 DIAGNOSIS — K21.9 GASTROESOPHAGEAL REFLUX DISEASE WITHOUT ESOPHAGITIS: ICD-10-CM

## 2023-12-11 DIAGNOSIS — E03.8 OTHER SPECIFIED HYPOTHYROIDISM: ICD-10-CM

## 2023-12-11 DIAGNOSIS — M19.90 ARTHRITIS: ICD-10-CM

## 2023-12-11 DIAGNOSIS — R73.09 ELEVATED HEMOGLOBIN A1C: ICD-10-CM

## 2023-12-11 DIAGNOSIS — Z00.00 ANNUAL PHYSICAL EXAM: ICD-10-CM

## 2023-12-11 DIAGNOSIS — E55.9 VITAMIN D DEFICIENCY: ICD-10-CM

## 2023-12-11 DIAGNOSIS — J45.20 MILD INTERMITTENT REACTIVE AIRWAY DISEASE WITHOUT COMPLICATION: ICD-10-CM

## 2023-12-11 DIAGNOSIS — Z87.891 PERSONAL HISTORY OF NICOTINE DEPENDENCE: ICD-10-CM

## 2023-12-11 DIAGNOSIS — Z23 NEED FOR INFLUENZA VACCINATION: Primary | ICD-10-CM

## 2023-12-11 DIAGNOSIS — R06.02 SHORTNESS OF BREATH: ICD-10-CM

## 2023-12-11 LAB
25(OH)D3 SERPL-MCNC: 41.6 NG/ML (ref 30–100)
ALBUMIN SERPL-MCNC: 4.4 G/DL (ref 3.5–5.2)
ALBUMIN/GLOB SERPL: 2.2 G/DL
ALP SERPL-CCNC: 70 U/L (ref 39–117)
ALT SERPL W P-5'-P-CCNC: 43 U/L (ref 1–41)
ANION GAP SERPL CALCULATED.3IONS-SCNC: 10 MMOL/L (ref 5–15)
AST SERPL-CCNC: 27 U/L (ref 1–40)
BASOPHILS # BLD AUTO: 0.07 10*3/MM3 (ref 0–0.2)
BASOPHILS NFR BLD AUTO: 1.5 % (ref 0–1.5)
BILIRUB SERPL-MCNC: 0.3 MG/DL (ref 0–1.2)
BUN SERPL-MCNC: 8 MG/DL (ref 8–23)
BUN/CREAT SERPL: 8.4 (ref 7–25)
CALCIUM SPEC-SCNC: 9 MG/DL (ref 8.6–10.5)
CHLORIDE SERPL-SCNC: 103 MMOL/L (ref 98–107)
CHOLEST SERPL-MCNC: 138 MG/DL (ref 0–200)
CO2 SERPL-SCNC: 25 MMOL/L (ref 22–29)
CREAT SERPL-MCNC: 0.95 MG/DL (ref 0.76–1.27)
DEPRECATED RDW RBC AUTO: 42.9 FL (ref 37–54)
EGFRCR SERPLBLD CKD-EPI 2021: 88.8 ML/MIN/1.73
EOSINOPHIL # BLD AUTO: 0.29 10*3/MM3 (ref 0–0.4)
EOSINOPHIL NFR BLD AUTO: 6.3 % (ref 0.3–6.2)
ERYTHROCYTE [DISTWIDTH] IN BLOOD BY AUTOMATED COUNT: 12.7 % (ref 12.3–15.4)
GLOBULIN UR ELPH-MCNC: 2 GM/DL
GLUCOSE SERPL-MCNC: 131 MG/DL (ref 65–99)
HBA1C MFR BLD: 6.1 % (ref 4.8–5.6)
HCT VFR BLD AUTO: 48.3 % (ref 37.5–51)
HDLC SERPL-MCNC: 37 MG/DL (ref 40–60)
HGB BLD-MCNC: 16.8 G/DL (ref 13–17.7)
IMM GRANULOCYTES # BLD AUTO: 0.02 10*3/MM3 (ref 0–0.05)
IMM GRANULOCYTES NFR BLD AUTO: 0.4 % (ref 0–0.5)
LDLC SERPL CALC-MCNC: 69 MG/DL (ref 0–100)
LDLC/HDLC SERPL: 1.71 {RATIO}
LYMPHOCYTES # BLD AUTO: 1.1 10*3/MM3 (ref 0.7–3.1)
LYMPHOCYTES NFR BLD AUTO: 23.7 % (ref 19.6–45.3)
MCH RBC QN AUTO: 32.1 PG (ref 26.6–33)
MCHC RBC AUTO-ENTMCNC: 34.8 G/DL (ref 31.5–35.7)
MCV RBC AUTO: 92.2 FL (ref 79–97)
MONOCYTES # BLD AUTO: 0.52 10*3/MM3 (ref 0.1–0.9)
MONOCYTES NFR BLD AUTO: 11.2 % (ref 5–12)
NEUTROPHILS NFR BLD AUTO: 2.64 10*3/MM3 (ref 1.7–7)
NEUTROPHILS NFR BLD AUTO: 56.9 % (ref 42.7–76)
NRBC BLD AUTO-RTO: 0 /100 WBC (ref 0–0.2)
PLATELET # BLD AUTO: 191 10*3/MM3 (ref 140–450)
PMV BLD AUTO: 11.5 FL (ref 6–12)
POTASSIUM SERPL-SCNC: 4.3 MMOL/L (ref 3.5–5.2)
PROT SERPL-MCNC: 6.4 G/DL (ref 6–8.5)
RBC # BLD AUTO: 5.24 10*6/MM3 (ref 4.14–5.8)
SODIUM SERPL-SCNC: 138 MMOL/L (ref 136–145)
T4 FREE SERPL-MCNC: 1.14 NG/DL (ref 0.93–1.7)
TRIGL SERPL-MCNC: 188 MG/DL (ref 0–150)
VLDLC SERPL-MCNC: 32 MG/DL (ref 5–40)
WBC NRBC COR # BLD AUTO: 4.64 10*3/MM3 (ref 3.4–10.8)

## 2023-12-11 PROCEDURE — 84436 ASSAY OF TOTAL THYROXINE: CPT

## 2023-12-11 PROCEDURE — 36415 COLL VENOUS BLD VENIPUNCTURE: CPT

## 2023-12-11 PROCEDURE — 84439 ASSAY OF FREE THYROXINE: CPT

## 2023-12-11 PROCEDURE — 83036 HEMOGLOBIN GLYCOSYLATED A1C: CPT

## 2023-12-11 PROCEDURE — 82306 VITAMIN D 25 HYDROXY: CPT

## 2023-12-11 PROCEDURE — 84480 ASSAY TRIIODOTHYRONINE (T3): CPT

## 2023-12-11 PROCEDURE — 80050 GENERAL HEALTH PANEL: CPT

## 2023-12-11 PROCEDURE — 80061 LIPID PANEL: CPT

## 2023-12-11 PROCEDURE — 84479 ASSAY OF THYROID (T3 OR T4): CPT

## 2023-12-11 RX ORDER — NAPROXEN 500 MG/1
500 TABLET ORAL 2 TIMES DAILY PRN
Qty: 90 TABLET | Refills: 0 | Status: SHIPPED | OUTPATIENT
Start: 2023-12-11

## 2023-12-11 NOTE — PROGRESS NOTES
"Chief Complaint  JEAN-PAUL ( Obstructive sleep apnea) , Reactive airway disease, and Follow-up (1 Year )    Subjective        Reinier Moctezuma presents to [facility]  History of Present Illness  Reactive airway disease  Doing well  Patient remains smoke-free  No longer needing bronchodilator therapies  Uses albuterol very infrequently  Objective   Vital Signs:  /77 (BP Location: Left arm, Patient Position: Sitting, Cuff Size: Adult)   Pulse 52   Temp 98 °F (36.7 °C) (Temporal)   Resp 17   Ht 177.8 cm (70\")   Wt 91.6 kg (202 lb)   SpO2 95% Comment: room air  BMI 28.98 kg/m²   Estimated body mass index is 28.98 kg/m² as calculated from the following:    Height as of this encounter: 177.8 cm (70\").    Weight as of this encounter: 91.6 kg (202 lb).               Physical Exam   Vital Signs Reviewed  General WDWN, Alert, NAD.    Chest:  good aeration, clear to auscultation bilaterally, tympanic to percussion bilaterally, no work of breathing noted  CV: RRR, no MGR, .  EXT:  no clubbing, no cyanosis, no edema, no joint tenderness  Neuro:  A&Ox3, CN grossly intact, no focal deficits.  Skin: No rashes or lesions noted  Result Review :      Data reviewed : Radiologic studies recent low-dose lung cancer screening CT scan showing no suspicious pulmonary nodules             Assessment and Plan   Diagnoses and all orders for this visit:    1. Need for influenza vaccination (Primary)  -     Cancel: Fluzone >6 Months ([id number])  -     Fluzone High-Dose 65+yrs ([id number])    2. Personal history of nicotine dependence  -      CT Chest Low Dose Cancer Screening WO; Future    3. Mild intermittent reactive airway disease without complication    4. Shortness of breath    5. Tobacco abuse, in remission    Plan  This time patient doing very well  He is reactive airway disease is currently resolved  Will discontinue Anoro  Continue albuterol as needed  Fluzone vaccine today in the " office  Encouraged RSV vaccine  Noah CT scan of the chest in November 2024         Follow Up   Return if symptoms worsen or fail to improve.  Patient was given instructions and counseling regarding his condition or for health maintenance advice. Please see specific information pulled into the AVS if appropriate.

## 2023-12-11 NOTE — PROGRESS NOTES
Chief Complaint    Annual physical exam with lab  Follow-up (6 months), Hypertension, Hyperlipidemia, Hypothyroidism, Heartburn, and Vitamin D Deficiency    SUBJECTIVE  Reinier Moctezuma presents to [facility]    Annual physical exam with lab    Hypertension:  Patient is taking Amlodipine, Atenolol.  Patient's Blood Pressure in clinic today is 124/68.  Patient does not monitor blood pressure at home.  Patient denies chest pain, shortness of air, headache, flushing, abnormal swelling in feet/ankles.  Patient's cardiologist is parviz.    Hyperlipidemia:  Patient is taking Rosuvastatin,.  Patient denies nocturnal leg cramps, myalgias.  Patient attempts to maintain a diet low in fat and carbohydrates.    Hypothyroidism:  Patient takes Levothyroxine.  Patient states he takes medication first thing in the morning on an empty stomach with just a sip of water.  Patient denies weight gain/loss, fatigue, jitters, hair shedding, neck fullness, difficulty swallowing.    Gastroesophageal Reflux:  Patient is taking Lansoprazole, with good control of symptoms.  Patient does not need over the counter medications for breakthrough symptoms.  Patient tries to avoid trigger foods, eat frequent small meals, not lie down within 2 hours of eating, avoids NSAIDS medications and alcohol.    Vitamin D Deficiency:  Patient is taking Vitamin D2 weekly and doing well.    ED:  Patient is taking Sildenafil PRN with good results.    Chronic low back/hip pain:  Patient is taking Naproxen PRN with good control of pain.    SA:  Patient is taking Zyrtec, Albuterol HFA, Anoro Ellipta.      History of Present Illness  Past Medical History:   Diagnosis Date    Allergic rhinitis due to allergen 03/01/2019    Arthritis     COPD (chronic obstructive pulmonary disease)     Erectile dysfunction 10/16/2015    Esophageal reflux disease     Essential hypertension 08/04/2017    GERD (gastroesophageal reflux disease) 03/01/2019     HLD (hyperlipidemia)     Hypothyroidism 03/17/2016    Limb swelling     Nicotine dependence 08/04/2017    Peyronie's disease     Polycythemia secondary to smoking     Prostatitis     Seasonal allergies     Sinus trouble     SOB (shortness of breath)     Tobacco use disorder 10/16/2015    Vitamin D deficiency 05/25/2021    Wrist sprain 07/08/2015      Family History   Problem Relation Age of Onset    Aneurysm Mother     Heart disease Father     Cancer Father     Nephrolithiasis Father     Lung cancer Father     Cancer Brother     Colon cancer Brother 65    Liver cancer Brother 65    Diabetes Maternal Grandmother     Diabetes Other       Past Surgical History:   Procedure Laterality Date    COLONOSCOPY  2008    CYST REMOVAL      CYSTOSCOPY      HEEL SPUR SURGERY      NOSE SURGERY  2011    DR ZAMBRANO    SHOULDER SURGERY      VASECTOMY          Current Outpatient Medications:     albuterol sulfate  (90 Base) MCG/ACT inhaler, Inhale 2 puffs Every 4 (Four) Hours As Needed for Wheezing or Shortness of Air., Disp: 8.5 g, Rfl: 1    amLODIPine (NORVASC) 10 MG tablet, Take 1 tablet by mouth Daily., Disp: 90 tablet, Rfl: 1    ammonium lactate (AMLACTIN) 12 % cream, Apply daily after bathing to arms/legs, Disp: 140 g, Rfl: 11    aspirin 81 MG oral suspension, Take 81 mL by mouth Daily., Disp: , Rfl:     atenolol (TENORMIN) 100 MG tablet, Take 1 tablet by mouth Daily., Disp: 90 tablet, Rfl: 1    cetirizine (zyrTEC) 10 MG tablet, Take 1 tablet by mouth Daily., Disp: 90 tablet, Rfl: 1    ezetimibe (ZETIA) 10 MG tablet, Take 1 tablet by mouth Daily., Disp: 90 tablet, Rfl: 1    lansoprazole (PREVACID) 15 MG capsule, Take 1 capsule by mouth Daily., Disp: 90 capsule, Rfl: 0    levothyroxine (SYNTHROID, LEVOTHROID) 50 MCG tablet, Take 1 tablet by mouth Daily., Disp: 90 tablet, Rfl: 1    naproxen (NAPROSYN) 500 MG tablet, Take 1 tablet by mouth 2 (Two) Times a Day As Needed for Pain., Disp: 90 tablet, Rfl: 0    rosuvastatin (CRESTOR)  "40 MG tablet, Take 1 tablet by mouth every night at bedtime., Disp: 90 tablet, Rfl: 1    sildenafil (Viagra) 100 MG tablet, Take 1 tablet by mouth Daily As Needed for Erectile Dysfunction., Disp: 30 tablet, Rfl: 2    umeclidinium-vilanterol (ANORO ELLIPTA) 62.5-25 MCG/INH aerosol powder  inhaler, Inhale 1 puff Daily for 30 days., Disp: 60 each, Rfl: 11    vitamin D (ERGOCALCIFEROL) 1.25 MG (47376 UT) capsule capsule, Take 1 capsule by mouth 1 (One) Time Per Week., Disp: 13 capsule, Rfl: 1    OBJECTIVE  Vital Signs:   /68 (BP Location: Left arm, Patient Position: Sitting, Cuff Size: Large Adult)   Pulse 53   Temp 98.1 °F (36.7 °C) (Oral)   Ht 177.8 cm (70\")   Wt 91 kg (200 lb 9.6 oz)   SpO2 98%   BMI 28.78 kg/m²    Estimated body mass index is 28.78 kg/m² as calculated from the following:    Height as of this encounter: 177.8 cm (70\").    Weight as of this encounter: 91 kg (200 lb 9.6 oz).     Wt Readings from Last 3 Encounters:   12/11/23 91 kg (200 lb 9.6 oz)   08/15/23 87.4 kg (192 lb 10.9 oz)   06/05/23 88.5 kg (195 lb)     BP Readings from Last 3 Encounters:   12/11/23 124/68   08/15/23 111/80   06/05/23 104/66       Physical Exam  Vitals reviewed.   Constitutional:       Appearance: Normal appearance. He is well-developed.   HENT:      Head: Normocephalic and atraumatic.      Right Ear: External ear normal.      Left Ear: External ear normal.      Mouth/Throat:      Pharynx: No oropharyngeal exudate.   Eyes:      Conjunctiva/sclera: Conjunctivae normal.      Pupils: Pupils are equal, round, and reactive to light.   Neck:      Vascular: No carotid bruit.   Cardiovascular:      Rate and Rhythm: Normal rate and regular rhythm.      Pulses: Normal pulses.      Heart sounds: Normal heart sounds. No murmur heard.     No friction rub. No gallop.   Pulmonary:      Effort: Pulmonary effort is normal.      Breath sounds: Normal breath sounds. No wheezing or rhonchi.   Skin:     General: Skin is warm and dry. "   Neurological:      Mental Status: He is alert and oriented to person, place, and time.      Cranial Nerves: No cranial nerve deficit.   Psychiatric:         Mood and Affect: Mood and affect normal.         Behavior: Behavior normal.         Thought Content: Thought content normal.         Judgment: Judgment normal.          Result Review        CT Chest Low Dose Cancer Screening WO    Result Date: 11/7/2023    1. No evidence of lung cancer. 2. No acute cardiopulmonary process.  LUNG RADS:                           1 (negative, less than 1% chance of malignancy)     SPENCER MAXWELL MD       Electronically Signed and Approved By: SPENCER MAXWELL MD on 11/07/2023 at 10:24                 The above data has been reviewed by BRAN Pruitt 12/11/2023 07:45 EST.          Patient Care Team:  Margie Belcher APRN as PCP - General (Family Medicine)  Reva Pedro MD as Consulting Physician (Hematology and Oncology)  Tej Mcnulty DO as Consulting Physician (Pulmonary Disease)  Yeison Ibarra MD as Consulting Physician (Cardiology)            ASSESSMENT & PLAN    Diagnoses and all orders for this visit:    1. Annual physical exam (Primary)  -     Comprehensive Metabolic Panel; Future  -     CBC & Differential; Future  -     Lipid Panel; Future    2. Elevated hemoglobin A1c  Comments:  Check hemoglobin A1c  Orders:  -     Hemoglobin A1c; Future    3. Essential hypertension  Comments:  BP well-controlled, continue current medication    4. Mixed hyperlipidemia    5. Other specified hypothyroidism  Comments:  Continue levothyroxine and check lab  Orders:  -     Thyroid Profile II; Future  -     T4, free; Future    6. Vitamin D deficiency  -     Vitamin D 25 hydroxy; Future    7. Gastroesophageal reflux disease without esophagitis  Comments:  Symptoms well controlled with current medication regimen, cont. Current meds.    8. Colon cancer screening  -     Ambulatory Referral For Screening  Colonoscopy    9. Arthritis  Comments:  Symptoms controlled with as needed naproxen  Orders:  -     naproxen (NAPROSYN) 500 MG tablet; Take 1 tablet by mouth 2 (Two) Times a Day As Needed for Pain.  Dispense: 90 tablet; Refill: 0       The patient is advised to continue current medications, continue current healthy lifestyle patterns, and return for routine annual checkups.    Tobacco Use: Medium Risk (12/11/2023)    Patient History     Smoking Tobacco Use: Former     Smokeless Tobacco Use: Never     Passive Exposure: Never       Follow Up     Return in about 6 months (around 6/11/2024).      Patient was given instructions and counseling regarding his condition or for health maintenance advice. Please see specific information pulled into the AVS if appropriate.   I have reviewed information obtained and documented by others and I have confirmed the accuracy of this documented note.    BRAN Pruitt

## 2023-12-12 ENCOUNTER — TELEPHONE (OUTPATIENT)
Dept: GASTROENTEROLOGY | Facility: CLINIC | Age: 63
End: 2023-12-12
Payer: COMMERCIAL

## 2023-12-12 LAB
FT4I SERPL CALC-MCNC: 1.9 (ref 1.2–4.9)
T3 SERPL-MCNC: 130 NG/DL (ref 71–180)
T3RU NFR SERPL: 24 % (ref 24–39)
T4 SERPL-MCNC: 7.8 UG/DL (ref 4.5–12)
TSH SERPL DL<=0.005 MIU/L-ACNC: 3.54 UIU/ML (ref 0.45–4.5)

## 2023-12-12 NOTE — TELEPHONE ENCOUNTER
Spoke with patient in regards to a referral we received from Allen Belcher to schedule a colonoscopy. He is on a 3 year colon recall. He is schedule for a phone screening on 3/14/24

## 2024-01-09 ENCOUNTER — OFFICE VISIT (OUTPATIENT)
Dept: CARDIOLOGY | Facility: CLINIC | Age: 64
End: 2024-01-09
Payer: COMMERCIAL

## 2024-01-09 VITALS
DIASTOLIC BLOOD PRESSURE: 74 MMHG | SYSTOLIC BLOOD PRESSURE: 126 MMHG | WEIGHT: 200 LBS | HEART RATE: 58 BPM | BODY MASS INDEX: 28.63 KG/M2 | HEIGHT: 70 IN

## 2024-01-09 DIAGNOSIS — I25.10 CORONARY ARTERY CALCIFICATION: ICD-10-CM

## 2024-01-09 DIAGNOSIS — E78.2 MIXED HYPERLIPIDEMIA: ICD-10-CM

## 2024-01-09 DIAGNOSIS — I25.84 CORONARY ARTERY CALCIFICATION: ICD-10-CM

## 2024-01-09 DIAGNOSIS — I10 ESSENTIAL HYPERTENSION: Primary | ICD-10-CM

## 2024-01-09 PROCEDURE — 99213 OFFICE O/P EST LOW 20 MIN: CPT | Performed by: INTERNAL MEDICINE

## 2024-01-12 NOTE — PROGRESS NOTES
CARDIOLOGY FOLLOW-UP PROGRESS NOTE        Chief Complaint  Follow-up, Hypertension, and Hyperlipidemia    Subjective            Reinier Moctezuma presents to [facility]  History of Present Illness      Mr Moctezuma is here for annual follow-up visit for hypertension hyperlipidemia coronary artery calcification.  He denies any recent episodes of chest pain or shortness of breath or palpitations.  He is fairly active at baseline.       Past History:    1) Hypertension; 2) Hypothyroidism; 3) Hyperlipidemia; 4) COPD, followed by Dr. Mcnulty; 5) Coronary artery calcification 6) Polycythemia     Medical History:  Past Medical History:   Diagnosis Date    Allergic rhinitis due to allergen 03/01/2019    Arthritis     Arthritis 08/24/2021    COPD (chronic obstructive pulmonary disease)     Coronary artery calcification 01/03/2022    Erectile dysfunction 10/16/2015    Esophageal reflux disease     Essential hypertension 08/04/2017    GERD (gastroesophageal reflux disease) 03/01/2019    HLD (hyperlipidemia)     Hypothyroidism 03/17/2016    Limb swelling     Nicotine dependence 08/04/2017    JEAN-PAUL (obstructive sleep apnea) 11/11/2021    Peyronie's disease     Polycythemia secondary to smoking     Prostatitis     Reactive airway disease 11/16/2022    Seasonal allergies     Sinus trouble     SOB (shortness of breath)     Tobacco use disorder 10/16/2015    Vitamin D deficiency 05/25/2021    Wrist sprain 07/08/2015       Surgical History: has a past surgical history that includes Colonoscopy (2008); Cyst Removal; Cystoscopy; Heel spur surgery; Nose surgery (2011); Shoulder surgery; and Vasectomy.     Family History: family history includes Aneurysm in his mother; Cancer in his brother and father; Colon cancer (age of onset: 65) in his brother; Diabetes in his maternal grandmother and another family member; Heart disease in his father; Liver cancer (age of onset: 65) in his brother; Lung cancer in his father;  Nephrolithiasis in his father.     Social History: reports that he quit smoking about 6 years ago. His smoking use included cigars. He has never been exposed to tobacco smoke. He has never used smokeless tobacco. He reports current alcohol use of about 3.0 standard drinks of alcohol per week. He reports that he does not use drugs.    Allergies: Patient has no known allergies.    Current Outpatient Medications on File Prior to Visit   Medication Sig    albuterol sulfate  (90 Base) MCG/ACT inhaler Inhale 2 puffs Every 4 (Four) Hours As Needed for Wheezing or Shortness of Air.    amLODIPine (NORVASC) 10 MG tablet Take 1 tablet by mouth Daily.    ammonium lactate (AMLACTIN) 12 % cream Apply daily after bathing to arms/legs    aspirin 81 MG oral suspension Take 81 mL by mouth Daily.    atenolol (TENORMIN) 100 MG tablet Take 1 tablet by mouth Daily.    cetirizine (zyrTEC) 10 MG tablet Take 1 tablet by mouth Daily.    ezetimibe (ZETIA) 10 MG tablet Take 1 tablet by mouth Daily.    lansoprazole (PREVACID) 15 MG capsule Take 1 capsule by mouth Daily.    levothyroxine (SYNTHROID, LEVOTHROID) 50 MCG tablet Take 1 tablet by mouth Daily.    naproxen (NAPROSYN) 500 MG tablet Take 1 tablet by mouth 2 (Two) Times a Day As Needed for Pain.    rosuvastatin (CRESTOR) 40 MG tablet Take 1 tablet by mouth every night at bedtime.    sildenafil (Viagra) 100 MG tablet Take 1 tablet by mouth Daily As Needed for Erectile Dysfunction.    vitamin D (ERGOCALCIFEROL) 1.25 MG (04044 UT) capsule capsule Take 1 capsule by mouth 1 (One) Time Per Week.    umeclidinium-vilanterol (ANORO ELLIPTA) 62.5-25 MCG/INH aerosol powder  inhaler Inhale 1 puff Daily for 30 days.     No current facility-administered medications on file prior to visit.          Review of Systems   Respiratory:  Negative for cough, shortness of breath and wheezing.    Cardiovascular:  Negative for chest pain, palpitations and leg swelling.   Gastrointestinal:  Negative for  "nausea and vomiting.   Neurological:  Negative for dizziness and syncope.        Objective     /74   Pulse 58   Ht 177.8 cm (70\")   Wt 90.7 kg (200 lb)   BMI 28.70 kg/m²       Physical Exam    General : Alert, awake, no acute distress  Neck : Supple, no carotid bruit, no jugular venous distention  CVS : Regular rate and rhythm, no murmur, rubs or gallops  Lungs: Clear to auscultation bilaterally, no crackles or rhonchi  Abdomen: Soft, nontender, bowel sounds heard in all 4 quadrants  Extremities: Warm, well-perfused, no pedal edema    Result Review :     The following data was reviewed by: Yeison Ibarra MD on 01/09/2024:    CMP          6/5/2023    08:19 12/11/2023    08:09   CMP   Glucose 119  131    BUN 9  8    Creatinine 0.97  0.95    EGFR 86.6  88.8    Sodium 139  138    Potassium 4.4  4.3    Chloride 104  103    Calcium 8.9  9.0    Total Protein 7.0  6.4    Albumin 4.7  4.4    Globulin 2.3  2.0    Total Bilirubin 0.5  0.3    Alkaline Phosphatase 69  70    AST (SGOT) 20  27    ALT (SGPT) 23  43    Albumin/Globulin Ratio 2.0  2.2    BUN/Creatinine Ratio 9.3  8.4    Anion Gap 10.0  10.0      CBC          6/27/2023    08:28 8/14/2023    09:40 12/11/2023    08:09   CBC   WBC 5.20  5.54  4.64    RBC 5.68  5.48  5.24    Hemoglobin 18.2  17.4  16.8    Hematocrit 52.2  50.2  48.3    MCV 91.9  91.6  92.2    MCH 32.0  31.8  32.1    MCHC 34.9  34.7  34.8    RDW 13.6  13.2  12.7    Platelets 194  174  191      TSH          6/5/2023    08:19 12/11/2023    08:09   TSH   TSH 2.500  3.540      Lipid Panel          6/5/2023    08:19 12/11/2023    08:09   Lipid Panel   Total Cholesterol 150  138    Triglycerides 228  188    HDL Cholesterol 37  37    VLDL Cholesterol 38  32    LDL Cholesterol  75  69    LDL/HDL Ratio 1.82  1.71           Data reviewed: Cardiology studies        Results for orders placed in visit on 07/06/21    Adult Transthoracic Echo Complete w/ Color, Spectral and Contrast if necessary per " protocol    Interpretation Summary  · Normal left ventricle systolic function with a calculated LV ejection fraction of 64%  · Left ventricular diastolic function is consistent with (grade I) impaired relaxation.  · There are no hemodynamically significant valvular abnormalities.                   Assessment and Plan        Diagnoses and all orders for this visit:    1. Essential hypertension (Primary)  Assessment & Plan:  Blood pressure very well-controlled.  Continue amlodipine 10 mg daily.      2. Coronary artery calcification  Assessment & Plan:  Currently asymptomatic.  Previous SPECT stress test did not show any evidence of reversible ischemia.  LV function is preserved.  Recommend to continue aspirin and statins.      3. Mixed hyperlipidemia  Assessment & Plan:  Most recent LDL is 69, which is at goal.  Continue rosuvastatin 40 mg daily, along with Zetia 10 mg daily.                Follow Up     Return in about 1 year (around 1/9/2025) for Next scheduled follow up.    Patient was given instructions and counseling regarding his condition or for health maintenance advice. Please see specific information pulled into the AVS if appropriate.

## 2024-01-12 NOTE — ASSESSMENT & PLAN NOTE
Most recent LDL is 69, which is at goal.  Continue rosuvastatin 40 mg daily, along with Zetia 10 mg daily.

## 2024-01-12 NOTE — ASSESSMENT & PLAN NOTE
Currently asymptomatic.  Previous SPECT stress test did not show any evidence of reversible ischemia.  LV function is preserved.  Recommend to continue aspirin and statins.

## 2024-01-22 DIAGNOSIS — I10 ESSENTIAL HYPERTENSION: ICD-10-CM

## 2024-01-22 DIAGNOSIS — E78.5 HYPERLIPIDEMIA, UNSPECIFIED HYPERLIPIDEMIA TYPE: Chronic | ICD-10-CM

## 2024-01-22 DIAGNOSIS — J30.2 SEASONAL ALLERGIC RHINITIS, UNSPECIFIED TRIGGER: ICD-10-CM

## 2024-01-22 DIAGNOSIS — E03.9 HYPOTHYROIDISM, UNSPECIFIED TYPE: Chronic | ICD-10-CM

## 2024-01-22 RX ORDER — EZETIMIBE 10 MG/1
10 TABLET ORAL DAILY
Qty: 90 TABLET | Refills: 1 | Status: SHIPPED | OUTPATIENT
Start: 2024-01-22

## 2024-01-22 RX ORDER — ROSUVASTATIN CALCIUM 40 MG/1
40 TABLET, COATED ORAL
Qty: 90 TABLET | Refills: 1 | Status: SHIPPED | OUTPATIENT
Start: 2024-01-22

## 2024-01-22 RX ORDER — CETIRIZINE HYDROCHLORIDE 10 MG/1
10 TABLET ORAL DAILY
Qty: 90 TABLET | Refills: 1 | Status: SHIPPED | OUTPATIENT
Start: 2024-01-22

## 2024-01-22 RX ORDER — AMLODIPINE BESYLATE 10 MG/1
10 TABLET ORAL DAILY
Qty: 90 TABLET | Refills: 1 | Status: SHIPPED | OUTPATIENT
Start: 2024-01-22

## 2024-01-22 RX ORDER — LEVOTHYROXINE SODIUM 0.05 MG/1
50 TABLET ORAL DAILY
Qty: 90 TABLET | Refills: 1 | Status: SHIPPED | OUTPATIENT
Start: 2024-01-22

## 2024-01-22 RX ORDER — ATENOLOL 100 MG/1
100 TABLET ORAL DAILY
Qty: 90 TABLET | Refills: 1 | Status: SHIPPED | OUTPATIENT
Start: 2024-01-22

## 2024-01-23 RX ORDER — MECOBALAMIN 5000 MCG
15 TABLET,DISINTEGRATING ORAL DAILY
Qty: 90 CAPSULE | Refills: 0 | Status: SHIPPED | OUTPATIENT
Start: 2024-01-23

## 2024-03-13 ENCOUNTER — TELEPHONE (OUTPATIENT)
Dept: VASCULAR SURGERY | Facility: HOSPITAL | Age: 64
End: 2024-03-13
Payer: COMMERCIAL

## 2024-03-13 NOTE — TELEPHONE ENCOUNTER
L/M FOR PT TO CALL AND SCHEDULE ANNUAL VASCULAR VISIT AND ABD US, PLEASE TRANSFER CALL DIRECTLY TO OFFICE.

## 2024-03-14 ENCOUNTER — PREP FOR SURGERY (OUTPATIENT)
Dept: OTHER | Facility: HOSPITAL | Age: 64
End: 2024-03-14
Payer: COMMERCIAL

## 2024-03-14 ENCOUNTER — CLINICAL SUPPORT (OUTPATIENT)
Dept: GASTROENTEROLOGY | Facility: CLINIC | Age: 64
End: 2024-03-14
Payer: COMMERCIAL

## 2024-03-14 DIAGNOSIS — K63.5 POLYP OF COLON, UNSPECIFIED PART OF COLON, UNSPECIFIED TYPE: Primary | ICD-10-CM

## 2024-03-14 DIAGNOSIS — Z80.0 FAMILY HISTORY OF COLON CANCER: Primary | ICD-10-CM

## 2024-03-14 DIAGNOSIS — K63.5 POLYP OF COLON, UNSPECIFIED PART OF COLON, UNSPECIFIED TYPE: ICD-10-CM

## 2024-03-14 DIAGNOSIS — Z80.0 FAMILY HISTORY OF COLON CANCER: ICD-10-CM

## 2024-03-14 RX ORDER — SODIUM, POTASSIUM,MAG SULFATES 17.5-3.13G
1 SOLUTION, RECONSTITUTED, ORAL ORAL EVERY 12 HOURS
Qty: 354 ML | Refills: 0 | Status: SHIPPED | OUTPATIENT
Start: 2024-03-14

## 2024-03-14 NOTE — PROGRESS NOTES
Reinier Moctezuma  1960  63 y.o.    Reason for call: Recall  Prep prescribed: Suprep  Prep instructions reviewed with patient and sent to patient via regular mail to the home address on file  Is the patient currently on any injectable medications for weight loss or diabetes? No  Clearance needed? Yes  If yes, what clearance is needed? Cardiology/pulmonology Susan   Clearance has been requested from n/a   The patient has been scheduled for: Colonoscopy  After your procedure, you will be contacted with results. Please confirm the best phone # to reach the patient: [phone]  Family history of colon cancer? Yes  If yes, indicate relative: Brother   Family History   Problem Relation Age of Onset    Aneurysm Mother     Heart disease Father     Cancer Father     Nephrolithiasis Father     Lung cancer Father     Cancer Brother     Colon cancer Brother 65    Liver cancer Brother 65    Diabetes Maternal Grandmother     Diabetes Other      Past Medical History:   Diagnosis Date    Allergic rhinitis due to allergen 03/01/2019    Arthritis     Arthritis 08/24/2021    COPD (chronic obstructive pulmonary disease)     Coronary artery calcification 01/03/2022    Erectile dysfunction 10/16/2015    Esophageal reflux disease     Essential hypertension 08/04/2017    GERD (gastroesophageal reflux disease) 03/01/2019    HLD (hyperlipidemia)     Hypothyroidism 03/17/2016    Limb swelling     Nicotine dependence 08/04/2017    JEAN-PAUL (obstructive sleep apnea) 11/11/2021    Peyronie's disease     Polycythemia secondary to smoking     Prostatitis     Reactive airway disease 11/16/2022    Seasonal allergies     Sinus trouble     SOB (shortness of breath)     Tobacco use disorder 10/16/2015    Vitamin D deficiency 05/25/2021    Wrist sprain 07/08/2015     No Known Allergies  Past Surgical History:   Procedure Laterality Date    COLONOSCOPY  2008    CYST REMOVAL      CYSTOSCOPY      HEEL SPUR SURGERY      NOSE SURGERY  2011      ZAMBRANO    SHOULDER SURGERY      VASECTOMY       Social History     Socioeconomic History    Marital status:    Tobacco Use    Smoking status: Former     Types: Cigars     Quit date: 2018     Years since quittin.2     Passive exposure: Never    Smokeless tobacco: Never   Vaping Use    Vaping status: Never Used   Substance and Sexual Activity    Alcohol use: Yes     Alcohol/week: 3.0 standard drinks of alcohol     Types: 3 Cans of beer per week     Comment: 4-5 BEERS A DAY; DRINKING 21-30 YEARS    Drug use: Never    Sexual activity: Yes     Partners: Female     Birth control/protection: None       Current Outpatient Medications:     albuterol sulfate  (90 Base) MCG/ACT inhaler, Inhale 2 puffs Every 4 (Four) Hours As Needed for Wheezing or Shortness of Air., Disp: 8.5 g, Rfl: 1    amLODIPine (NORVASC) 10 MG tablet, Take 1 tablet by mouth Daily., Disp: 90 tablet, Rfl: 1    ammonium lactate (AMLACTIN) 12 % cream, Apply daily after bathing to arms/legs, Disp: 140 g, Rfl: 11    aspirin 81 MG oral suspension, Take 81 mL by mouth Daily., Disp: , Rfl:     atenolol (TENORMIN) 100 MG tablet, Take 1 tablet by mouth Daily., Disp: 90 tablet, Rfl: 1    cetirizine (zyrTEC) 10 MG tablet, Take 1 tablet by mouth Daily., Disp: 90 tablet, Rfl: 1    ezetimibe (ZETIA) 10 MG tablet, Take 1 tablet by mouth Daily., Disp: 90 tablet, Rfl: 1    lansoprazole (PREVACID) 15 MG capsule, Take 1 capsule by mouth Daily., Disp: 90 capsule, Rfl: 0    levothyroxine (SYNTHROID, LEVOTHROID) 50 MCG tablet, Take 1 tablet by mouth Daily., Disp: 90 tablet, Rfl: 1    naproxen (NAPROSYN) 500 MG tablet, Take 1 tablet by mouth 2 (Two) Times a Day As Needed for Pain., Disp: 90 tablet, Rfl: 0    rosuvastatin (CRESTOR) 40 MG tablet, Take 1 tablet by mouth every night at bedtime., Disp: 90 tablet, Rfl: 1    sildenafil (Viagra) 100 MG tablet, Take 1 tablet by mouth Daily As Needed for Erectile Dysfunction., Disp: 30 tablet, Rfl: 2     umeclidinium-vilanterol (ANORO ELLIPTA) 62.5-25 MCG/INH aerosol powder  inhaler, Inhale 1 puff Daily for 30 days., Disp: 60 each, Rfl: 11    vitamin D (ERGOCALCIFEROL) 1.25 MG (94304 UT) capsule capsule, Take 1 capsule by mouth 1 (One) Time Per Week., Disp: 13 capsule, Rfl: 1

## 2024-04-11 DIAGNOSIS — E55.9 VITAMIN D DEFICIENCY: ICD-10-CM

## 2024-04-12 RX ORDER — ERGOCALCIFEROL 1.25 MG/1
50000 CAPSULE ORAL WEEKLY
Qty: 13 CAPSULE | Refills: 0 | Status: SHIPPED | OUTPATIENT
Start: 2024-04-12

## 2024-04-12 RX ORDER — MECOBALAMIN 5000 MCG
15 TABLET,DISINTEGRATING ORAL DAILY
Qty: 90 CAPSULE | Refills: 0 | Status: SHIPPED | OUTPATIENT
Start: 2024-04-12

## 2024-04-15 NOTE — PRE-PROCEDURE INSTRUCTIONS
Reminded of arrival time at  0630 , Entrance C of the [facility] hospital. Instructed to bring or have a  over the age of 18 set up to drive you home the day of procedure.    Instructed on clear liquid diet the day before, nothing red or purple. Call with any questions about the prep or if in need of the prep.  Reminded them not to eat or drink anything am of procedure unless its a sip of water with medications.  Hold naproxen, aspirin am of procedure.

## 2024-04-19 ENCOUNTER — ANESTHESIA EVENT (OUTPATIENT)
Dept: GASTROENTEROLOGY | Facility: HOSPITAL | Age: 64
End: 2024-04-19
Payer: COMMERCIAL

## 2024-04-19 NOTE — ANESTHESIA PREPROCEDURE EVALUATION
Anesthesia Evaluation     Patient summary reviewed and Nursing notes reviewed   NPO Solid Status: > 8 hours  NPO Liquid Status: > 2 hours           Airway   Mallampati: I  TM distance: >3 FB  Neck ROM: full  No difficulty expected  Comment: Beard  Dental - normal exam     Pulmonary - normal exam    breath sounds clear to auscultation  (+) a smoker (h/o reactive airway disease, copd) Former, COPD (inhalers prn -seldom uses) mild,sleep apnea on CPAP  Cardiovascular - normal exam  Exercise tolerance: good (4-7 METS)    ECG reviewed  Patient on routine beta blocker and Beta blocker given within 24 hours of surgery  Rhythm: regular  Rate: normal    (+) hypertension well controlled less than 2 medications, CAD, hyperlipidemia    ROS comment: Last cardiology appointment in Jan 2024- told to follow up in 1 year- no changes made at that appointment     Neuro/Psych  GI/Hepatic/Renal/Endo    (+) GERD well controlled, thyroid problem hypothyroidism    Musculoskeletal     Abdominal    Substance History      OB/GYN          Other   arthritis,     ROS/Med Hx Other: Duplex Aorta Scan 2/13/23:  2.6 cm maximum diameter distal abdominal aorta.  No significant common iliac artery dilatation bilaterally.  Patent NORBERTO's bilaterally by color flow.  No significant change from ultrasound form 3/16/2022.    No recent EKG      7/6/21 Echo  · Normal left ventricle systolic function with a calculated LV ejection fraction of 64%  · Left ventricular diastolic function is consistent with (grade I) impaired relaxation.  · There are no hemodynamically significant valvular abnormalities.        Phys Exam Other: beard                  Anesthesia Plan    ASA 3     general   total IV anesthesia  (Total IV Anesthesia    Patient understands anesthesia not responsible for dental damage.  )  intravenous induction     Anesthetic plan, risks, benefits, and alternatives have been provided, discussed and informed consent has been obtained with:  patient.  Pre-procedure education provided  Plan discussed with CRNA.        CODE STATUS:

## 2024-04-22 ENCOUNTER — HOSPITAL ENCOUNTER (OUTPATIENT)
Facility: HOSPITAL | Age: 64
Setting detail: HOSPITAL OUTPATIENT SURGERY
Discharge: HOME | End: 2024-04-22
Attending: INTERNAL MEDICINE | Admitting: INTERNAL MEDICINE
Payer: COMMERCIAL

## 2024-04-22 ENCOUNTER — ANESTHESIA (OUTPATIENT)
Dept: GASTROENTEROLOGY | Facility: HOSPITAL | Age: 64
End: 2024-04-22
Payer: COMMERCIAL

## 2024-04-22 VITALS
WEIGHT: 198.41 LBS | RESPIRATION RATE: 17 BRPM | BODY MASS INDEX: 28.47 KG/M2 | TEMPERATURE: 97.2 F | DIASTOLIC BLOOD PRESSURE: 82 MMHG | HEART RATE: 63 BPM | SYSTOLIC BLOOD PRESSURE: 119 MMHG | OXYGEN SATURATION: 95 %

## 2024-04-22 DIAGNOSIS — K63.5 POLYP OF COLON, UNSPECIFIED PART OF COLON, UNSPECIFIED TYPE: ICD-10-CM

## 2024-04-22 DIAGNOSIS — Z80.0 FAMILY HISTORY OF COLON CANCER: ICD-10-CM

## 2024-04-22 PROCEDURE — 45385 COLONOSCOPY W/LESION REMOVAL: CPT | Performed by: INTERNAL MEDICINE

## 2024-04-22 PROCEDURE — 25010000002 PROPOFOL 10 MG/ML EMULSION: Performed by: NURSE ANESTHETIST, CERTIFIED REGISTERED

## 2024-04-22 PROCEDURE — 25810000003 LACTATED RINGERS PER 1000 ML: Performed by: NURSE ANESTHETIST, CERTIFIED REGISTERED

## 2024-04-22 PROCEDURE — 88305 TISSUE EXAM BY PATHOLOGIST: CPT | Performed by: INTERNAL MEDICINE

## 2024-04-22 RX ORDER — LIDOCAINE HYDROCHLORIDE 20 MG/ML
INJECTION, SOLUTION EPIDURAL; INFILTRATION; INTRACAUDAL; PERINEURAL AS NEEDED
Status: DISCONTINUED | OUTPATIENT
Start: 2024-04-22 | End: 2024-04-22 | Stop reason: SURG

## 2024-04-22 RX ORDER — SODIUM CHLORIDE, SODIUM LACTATE, POTASSIUM CHLORIDE, CALCIUM CHLORIDE 600; 310; 30; 20 MG/100ML; MG/100ML; MG/100ML; MG/100ML
30 INJECTION, SOLUTION INTRAVENOUS CONTINUOUS
Status: DISCONTINUED | OUTPATIENT
Start: 2024-04-22 | End: 2024-04-22 | Stop reason: HOSPADM

## 2024-04-22 RX ORDER — PROPOFOL 10 MG/ML
VIAL (ML) INTRAVENOUS AS NEEDED
Status: DISCONTINUED | OUTPATIENT
Start: 2024-04-22 | End: 2024-04-22 | Stop reason: SURG

## 2024-04-22 RX ADMIN — SODIUM CHLORIDE, POTASSIUM CHLORIDE, SODIUM LACTATE AND CALCIUM CHLORIDE 30 ML/HR: 600; 310; 30; 20 INJECTION, SOLUTION INTRAVENOUS at 06:49

## 2024-04-22 RX ADMIN — PROPOFOL 250 MCG/KG/MIN: 10 INJECTION, EMULSION INTRAVENOUS at 07:22

## 2024-04-22 RX ADMIN — LIDOCAINE HYDROCHLORIDE 40 MG: 20 INJECTION, SOLUTION INTRAVENOUS at 07:22

## 2024-04-22 RX ADMIN — PROPOFOL 100 MG: 10 INJECTION, EMULSION INTRAVENOUS at 07:22

## 2024-04-22 NOTE — ANESTHESIA POSTPROCEDURE EVALUATION
Patient: Reinier Moctezuma    Procedure Summary       Date: 04/22/24 Room / Location: [facility] ENDOSCOPY 1 / [facility] ENDOSCOPY    Anesthesia Start: 0721 Anesthesia Stop: 0742    Procedure: COLONOSCOPY WITH COLD SNARE POLYPECTOMIES Diagnosis:       Polyp of colon, unspecified part of colon, unspecified type      Family history of colon cancer      (Polyp of colon, unspecified part of colon, unspecified type [K63.5])      (Family history of colon cancer [Z80.0])    Surgeons: Silvana Chaevz MD Provider: Lee Ann Leyva CRNA    Anesthesia Type: general ASA Status: 3            Anesthesia Type: general    Vitals  Vitals Value Taken Time   /82 04/22/24 0756   Temp 36.2 °C (97.2 °F) 04/22/24 0755   Pulse 56 04/22/24 0759   Resp 17 04/22/24 0755   SpO2 95 % 04/22/24 0759   Vitals shown include unfiled device data.        Post Anesthesia Care and Evaluation    Patient location during evaluation: bedside  Patient participation: complete - patient participated  Level of consciousness: awake  Pain management: adequate    Airway patency: patent  Anesthetic complications: No anesthetic complications  PONV Status: controlled  Cardiovascular status: acceptable and stable  Respiratory status: acceptable

## 2024-04-22 NOTE — H&P
Pre Procedure History & Physical    Chief Complaint:   Screening colonoscopy    Subjective     HPI:   63 yo M here for screening colonoscopy.    Past Medical History:   Past Medical History:   Diagnosis Date    Allergic rhinitis due to allergen 03/01/2019    Arthritis     Arthritis 08/24/2021    Colon polyps     COPD (chronic obstructive pulmonary disease)     Coronary artery calcification 01/03/2022    Erectile dysfunction 10/16/2015    Esophageal reflux disease     Essential hypertension 08/04/2017    GERD (gastroesophageal reflux disease) 03/01/2019    HLD (hyperlipidemia)     Hypothyroidism 03/17/2016    Limb swelling     Nicotine dependence 08/04/2017    JEAN-PAUL (obstructive sleep apnea) 11/11/2021    Peyronie's disease     Polycythemia secondary to smoking     Prostatitis     Reactive airway disease 11/16/2022    Seasonal allergies     Sinus trouble     SOB (shortness of breath)     Tobacco use disorder 10/16/2015    Vitamin D deficiency 05/25/2021    Wrist sprain 07/08/2015       Past Surgical History:  Past Surgical History:   Procedure Laterality Date    COLONOSCOPY  2008    CYST REMOVAL      CYSTOSCOPY      HEEL SPUR SURGERY      NOSE SURGERY  2011    DR ZAMBRANO    SHOULDER SURGERY      VASECTOMY         Family History:  Family History   Problem Relation Age of Onset    Aneurysm Mother     Heart disease Father     Cancer Father     Nephrolithiasis Father     Lung cancer Father     Cancer Brother     Colon cancer Brother 65    Liver cancer Brother 65    Diabetes Maternal Grandmother     Diabetes Other        Social History:   reports that he quit smoking about 6 years ago. His smoking use included cigars. He has never been exposed to tobacco smoke. He has never used smokeless tobacco. He reports current alcohol use of about 3.0 standard drinks of alcohol per week. He reports current drug use. Drug: Marijuana.    Medications:   Medications Prior to Admission   Medication Sig Dispense Refill Last Dose    albuterol  sulfate  (90 Base) MCG/ACT inhaler Inhale 2 puffs Every 4 (Four) Hours As Needed for Wheezing or Shortness of Air. 8.5 g 1 Past Month    amLODIPine (NORVASC) 10 MG tablet Take 1 tablet by mouth Daily. 90 tablet 1 Past Week    ammonium lactate (AMLACTIN) 12 % cream Apply daily after bathing to arms/legs 140 g 11 Past Week    aspirin 81 MG oral suspension Take 81 mL by mouth Daily.   Past Week    atenolol (TENORMIN) 100 MG tablet Take 1 tablet by mouth Daily. 90 tablet 1 Past Week    cetirizine (zyrTEC) 10 MG tablet Take 1 tablet by mouth Daily. 90 tablet 1 Past Month    ezetimibe (ZETIA) 10 MG tablet Take 1 tablet by mouth Daily. 90 tablet 1 Past Week    lansoprazole (PREVACID) 15 MG capsule Take 1 capsule by mouth Daily. 90 capsule 0 4/21/2024    levothyroxine (SYNTHROID, LEVOTHROID) 50 MCG tablet Take 1 tablet by mouth Daily. 90 tablet 1 4/21/2024    naproxen (NAPROSYN) 500 MG tablet Take 1 tablet by mouth 2 (Two) Times a Day As Needed for Pain. 90 tablet 0 Past Month    rosuvastatin (CRESTOR) 40 MG tablet Take 1 tablet by mouth every night at bedtime. 90 tablet 1 Past Week    sildenafil (Viagra) 100 MG tablet Take 1 tablet by mouth Daily As Needed for Erectile Dysfunction. 30 tablet 2 Past Month    vitamin D (ERGOCALCIFEROL) 1.25 MG (93189 UT) capsule capsule Take 1 capsule by mouth 1 (One) Time Per Week. 13 capsule 0 Past Week       Allergies:  Patient has no known allergies.    ROS:    Pertinent items are noted in HPI     Objective     Blood pressure 141/95, pulse 70, temperature 97.7 °F (36.5 °C), temperature source Temporal, resp. rate 20, weight 90 kg (198 lb 6.6 oz), SpO2 97%.    Physical Exam   Constitutional: Pt is oriented to person, place, and time and well-developed, well-nourished, and in no distress.   Mouth/Throat: Oropharynx is clear and moist.   Neck: Normal range of motion.   Cardiovascular: Normal rate, regular rhythm and normal heart sounds.    Pulmonary/Chest: Effort normal and breath  sounds normal.   Abdominal: Soft. Nontender  Skin: Skin is warm and dry.   Psychiatric: Mood, memory, affect and judgment normal.     Assessment & Plan     Diagnosis:  Screening colonoscopy    Anticipated Surgical Procedure:  Colonoscopy    The risks, benefits, and alternatives of this procedure have been discussed with the patient or the responsible party- the patient understands and agrees to proceed.

## 2024-04-23 ENCOUNTER — TELEPHONE (OUTPATIENT)
Dept: GASTROENTEROLOGY | Facility: CLINIC | Age: 64
End: 2024-04-23
Payer: COMMERCIAL

## 2024-04-23 NOTE — TELEPHONE ENCOUNTER
----- Message from BRAN Faria sent at 4/23/2024  2:28 PM EDT -----  Benign colon polyps on path-repeat colonoscopy in 5 years

## 2024-07-03 DIAGNOSIS — E55.9 VITAMIN D DEFICIENCY: ICD-10-CM

## 2024-07-03 DIAGNOSIS — J30.2 SEASONAL ALLERGIC RHINITIS, UNSPECIFIED TRIGGER: ICD-10-CM

## 2024-07-03 DIAGNOSIS — I10 ESSENTIAL HYPERTENSION: ICD-10-CM

## 2024-07-03 DIAGNOSIS — E78.5 HYPERLIPIDEMIA, UNSPECIFIED HYPERLIPIDEMIA TYPE: Chronic | ICD-10-CM

## 2024-07-03 DIAGNOSIS — E03.9 HYPOTHYROIDISM, UNSPECIFIED TYPE: Chronic | ICD-10-CM

## 2024-07-03 RX ORDER — AMLODIPINE BESYLATE 10 MG/1
10 TABLET ORAL DAILY
Qty: 30 TABLET | Refills: 0 | Status: SHIPPED | OUTPATIENT
Start: 2024-07-03

## 2024-07-03 RX ORDER — ERGOCALCIFEROL 1.25 MG/1
50000 CAPSULE ORAL WEEKLY
Qty: 13 CAPSULE | Refills: 0 | Status: SHIPPED | OUTPATIENT
Start: 2024-07-03

## 2024-07-03 RX ORDER — EZETIMIBE 10 MG/1
10 TABLET ORAL DAILY
Qty: 30 TABLET | Refills: 0 | Status: SHIPPED | OUTPATIENT
Start: 2024-07-03

## 2024-07-03 RX ORDER — ATENOLOL 100 MG/1
100 TABLET ORAL DAILY
Qty: 30 TABLET | Refills: 0 | Status: SHIPPED | OUTPATIENT
Start: 2024-07-03

## 2024-07-03 RX ORDER — LEVOTHYROXINE SODIUM 0.05 MG/1
50 TABLET ORAL DAILY
Qty: 30 TABLET | Refills: 0 | Status: SHIPPED | OUTPATIENT
Start: 2024-07-03

## 2024-07-03 RX ORDER — ROSUVASTATIN CALCIUM 40 MG/1
40 TABLET, COATED ORAL
Qty: 30 TABLET | Refills: 0 | Status: SHIPPED | OUTPATIENT
Start: 2024-07-03

## 2024-07-03 RX ORDER — CETIRIZINE HYDROCHLORIDE 10 MG/1
10 TABLET ORAL DAILY
Qty: 30 TABLET | Refills: 0 | Status: SHIPPED | OUTPATIENT
Start: 2024-07-03

## 2024-07-03 RX ORDER — MECOBALAMIN 5000 MCG
15 TABLET,DISINTEGRATING ORAL DAILY
Qty: 30 CAPSULE | Refills: 0 | Status: SHIPPED | OUTPATIENT
Start: 2024-07-03

## 2024-07-03 NOTE — TELEPHONE ENCOUNTER
30-day supply given, patient due for appointment.,  Please attempt to get patient scheduled for July 9 if possible

## 2024-07-16 ENCOUNTER — OFFICE VISIT (OUTPATIENT)
Dept: FAMILY MEDICINE CLINIC | Facility: CLINIC | Age: 64
End: 2024-07-16
Payer: COMMERCIAL

## 2024-07-16 ENCOUNTER — LAB (OUTPATIENT)
Dept: LAB | Facility: HOSPITAL | Age: 64
End: 2024-07-16
Payer: COMMERCIAL

## 2024-07-16 VITALS
WEIGHT: 195.4 LBS | HEIGHT: 70 IN | DIASTOLIC BLOOD PRESSURE: 59 MMHG | BODY MASS INDEX: 27.97 KG/M2 | OXYGEN SATURATION: 98 % | SYSTOLIC BLOOD PRESSURE: 125 MMHG | TEMPERATURE: 97.7 F | HEART RATE: 56 BPM

## 2024-07-16 DIAGNOSIS — R73.09 ELEVATED HEMOGLOBIN A1C: ICD-10-CM

## 2024-07-16 DIAGNOSIS — Z12.5 PROSTATE CANCER SCREENING: ICD-10-CM

## 2024-07-16 DIAGNOSIS — E78.5 HYPERLIPIDEMIA, UNSPECIFIED HYPERLIPIDEMIA TYPE: Chronic | ICD-10-CM

## 2024-07-16 DIAGNOSIS — K21.9 GASTROESOPHAGEAL REFLUX DISEASE WITHOUT ESOPHAGITIS: ICD-10-CM

## 2024-07-16 DIAGNOSIS — I10 ESSENTIAL HYPERTENSION: ICD-10-CM

## 2024-07-16 DIAGNOSIS — I10 ESSENTIAL HYPERTENSION: Primary | ICD-10-CM

## 2024-07-16 DIAGNOSIS — N52.9 ERECTILE DYSFUNCTION, UNSPECIFIED ERECTILE DYSFUNCTION TYPE: ICD-10-CM

## 2024-07-16 DIAGNOSIS — E03.9 HYPOTHYROIDISM, UNSPECIFIED TYPE: Chronic | ICD-10-CM

## 2024-07-16 DIAGNOSIS — M19.90 ARTHRITIS: ICD-10-CM

## 2024-07-16 DIAGNOSIS — J30.2 SEASONAL ALLERGIC RHINITIS, UNSPECIFIED TRIGGER: ICD-10-CM

## 2024-07-16 DIAGNOSIS — Z72.0 TOBACCO ABUSE: ICD-10-CM

## 2024-07-16 LAB
ALBUMIN SERPL-MCNC: 4.4 G/DL (ref 3.5–5.2)
ALBUMIN/GLOB SERPL: 1.8 G/DL
ALP SERPL-CCNC: 62 U/L (ref 39–117)
ALT SERPL W P-5'-P-CCNC: 35 U/L (ref 1–41)
ANION GAP SERPL CALCULATED.3IONS-SCNC: 9.7 MMOL/L (ref 5–15)
AST SERPL-CCNC: 21 U/L (ref 1–40)
BASOPHILS # BLD AUTO: 0.08 10*3/MM3 (ref 0–0.2)
BASOPHILS NFR BLD AUTO: 1.6 % (ref 0–1.5)
BILIRUB SERPL-MCNC: 0.4 MG/DL (ref 0–1.2)
BUN SERPL-MCNC: 11 MG/DL (ref 8–23)
BUN/CREAT SERPL: 10.2 (ref 7–25)
CALCIUM SPEC-SCNC: 9.2 MG/DL (ref 8.6–10.5)
CHLORIDE SERPL-SCNC: 104 MMOL/L (ref 98–107)
CHOLEST SERPL-MCNC: 137 MG/DL (ref 0–200)
CO2 SERPL-SCNC: 25.3 MMOL/L (ref 22–29)
CREAT SERPL-MCNC: 1.08 MG/DL (ref 0.76–1.27)
DEPRECATED RDW RBC AUTO: 43.6 FL (ref 37–54)
EGFRCR SERPLBLD CKD-EPI 2021: 75.7 ML/MIN/1.73
EOSINOPHIL # BLD AUTO: 0.31 10*3/MM3 (ref 0–0.4)
EOSINOPHIL NFR BLD AUTO: 6.3 % (ref 0.3–6.2)
ERYTHROCYTE [DISTWIDTH] IN BLOOD BY AUTOMATED COUNT: 13 % (ref 12.3–15.4)
GLOBULIN UR ELPH-MCNC: 2.4 GM/DL
GLUCOSE SERPL-MCNC: 124 MG/DL (ref 65–99)
HBA1C MFR BLD: 5.6 % (ref 4.8–5.6)
HCT VFR BLD AUTO: 51.6 % (ref 37.5–51)
HDLC SERPL-MCNC: 35 MG/DL (ref 40–60)
HGB BLD-MCNC: 17.5 G/DL (ref 13–17.7)
IMM GRANULOCYTES # BLD AUTO: 0.02 10*3/MM3 (ref 0–0.05)
IMM GRANULOCYTES NFR BLD AUTO: 0.4 % (ref 0–0.5)
LDLC SERPL CALC-MCNC: 64 MG/DL (ref 0–100)
LDLC/HDLC SERPL: 1.56 {RATIO}
LYMPHOCYTES # BLD AUTO: 1.13 10*3/MM3 (ref 0.7–3.1)
LYMPHOCYTES NFR BLD AUTO: 22.8 % (ref 19.6–45.3)
MCH RBC QN AUTO: 31.6 PG (ref 26.6–33)
MCHC RBC AUTO-ENTMCNC: 33.9 G/DL (ref 31.5–35.7)
MCV RBC AUTO: 93.1 FL (ref 79–97)
MONOCYTES # BLD AUTO: 0.74 10*3/MM3 (ref 0.1–0.9)
MONOCYTES NFR BLD AUTO: 14.9 % (ref 5–12)
NEUTROPHILS NFR BLD AUTO: 2.68 10*3/MM3 (ref 1.7–7)
NEUTROPHILS NFR BLD AUTO: 54 % (ref 42.7–76)
NRBC BLD AUTO-RTO: 0 /100 WBC (ref 0–0.2)
PLATELET # BLD AUTO: 193 10*3/MM3 (ref 140–450)
PMV BLD AUTO: 11.2 FL (ref 6–12)
POTASSIUM SERPL-SCNC: 5 MMOL/L (ref 3.5–5.2)
PROT SERPL-MCNC: 6.8 G/DL (ref 6–8.5)
PSA SERPL-MCNC: 1.49 NG/ML (ref 0–4)
RBC # BLD AUTO: 5.54 10*6/MM3 (ref 4.14–5.8)
SODIUM SERPL-SCNC: 139 MMOL/L (ref 136–145)
TRIGL SERPL-MCNC: 237 MG/DL (ref 0–150)
TSH SERPL DL<=0.05 MIU/L-ACNC: 2.52 UIU/ML (ref 0.27–4.2)
VLDLC SERPL-MCNC: 38 MG/DL (ref 5–40)
WBC NRBC COR # BLD AUTO: 4.96 10*3/MM3 (ref 3.4–10.8)

## 2024-07-16 PROCEDURE — 80050 GENERAL HEALTH PANEL: CPT

## 2024-07-16 PROCEDURE — 36415 COLL VENOUS BLD VENIPUNCTURE: CPT

## 2024-07-16 PROCEDURE — G0103 PSA SCREENING: HCPCS

## 2024-07-16 PROCEDURE — 80061 LIPID PANEL: CPT

## 2024-07-16 PROCEDURE — 83036 HEMOGLOBIN GLYCOSYLATED A1C: CPT

## 2024-07-16 PROCEDURE — 99214 OFFICE O/P EST MOD 30 MIN: CPT | Performed by: NURSE PRACTITIONER

## 2024-07-16 RX ORDER — MECOBALAMIN 5000 MCG
15 TABLET,DISINTEGRATING ORAL DAILY
Qty: 90 CAPSULE | Refills: 1 | Status: SHIPPED | OUTPATIENT
Start: 2024-07-16

## 2024-07-16 RX ORDER — EZETIMIBE 10 MG/1
10 TABLET ORAL DAILY
Qty: 90 TABLET | Refills: 1 | Status: SHIPPED | OUTPATIENT
Start: 2024-07-16

## 2024-07-16 RX ORDER — ATENOLOL 100 MG/1
100 TABLET ORAL DAILY
Qty: 90 TABLET | Refills: 1 | Status: SHIPPED | OUTPATIENT
Start: 2024-07-16

## 2024-07-16 RX ORDER — NAPROXEN 500 MG/1
500 TABLET ORAL 2 TIMES DAILY PRN
Qty: 90 TABLET | Refills: 1 | Status: CANCELLED | OUTPATIENT
Start: 2024-07-16

## 2024-07-16 RX ORDER — SILDENAFIL 100 MG/1
100 TABLET, FILM COATED ORAL DAILY PRN
Qty: 30 TABLET | Refills: 2 | Status: SHIPPED | OUTPATIENT
Start: 2024-07-16

## 2024-07-16 RX ORDER — AMLODIPINE BESYLATE 10 MG/1
10 TABLET ORAL DAILY
Qty: 90 TABLET | Refills: 1 | Status: SHIPPED | OUTPATIENT
Start: 2024-07-16

## 2024-07-16 RX ORDER — LEVOTHYROXINE SODIUM 0.05 MG/1
50 TABLET ORAL DAILY
Qty: 90 TABLET | Refills: 1 | Status: SHIPPED | OUTPATIENT
Start: 2024-07-16

## 2024-07-16 RX ORDER — ROSUVASTATIN CALCIUM 40 MG/1
40 TABLET, COATED ORAL
Qty: 90 TABLET | Refills: 1 | Status: CANCELLED | OUTPATIENT
Start: 2024-07-16

## 2024-07-16 RX ORDER — ALBUTEROL SULFATE 90 UG/1
2 AEROSOL, METERED RESPIRATORY (INHALATION) EVERY 4 HOURS PRN
Qty: 8.5 G | Refills: 1 | Status: SHIPPED | OUTPATIENT
Start: 2024-07-16

## 2024-07-16 RX ORDER — CETIRIZINE HYDROCHLORIDE 10 MG/1
10 TABLET ORAL DAILY
Qty: 90 TABLET | Refills: 1 | Status: CANCELLED | OUTPATIENT
Start: 2024-07-16

## 2024-07-16 NOTE — PROGRESS NOTES
Chief Complaint  Follow-up (6 months), Hypertension, Hyperlipidemia, Hypothyroidism, Heartburn, Vitamin D Deficiency, Erectile Dysfunction, Chronic Lumbago, and Allergies    SUBJECTIVE  Reinier Moctezuma presents to [facility]    Hypertension:  Patient is taking Amlodipine, ASA, Atenolol,.  Patient's Blood Pressure in clinic today is 125/59.  Patient does not monitor blood pressure at home.  Patient denies chest pain, shortness of air, headache, flushing, abnormal swelling in feet/ankles.  Patient's cardiologist is parviz    Hyperlipidemia:  Patient is taking Zetia, Rosuvastatin - patient decreased to 1/2 tab nightly due to hip pain.  Patient denies nocturnal leg cramps, myalgias.  Patient attempts to maintain a diet low in fat and carbohydrates.    Hypothyroidism:  Patient takes Levothyroxine.  Patient states he takes medication first thing in the morning on an empty stomach with just a sip of water.  Patient denies weight gain/loss, fatigue, jitters, hair shedding, neck fullness, difficulty swallowing.    Gastroesophageal Reflux:  Patient is taking Lansoprazole, with good control of symptoms.  Patient does not need over the counter medications for breakthrough symptoms.  Patient tries to avoid trigger foods, eat frequent small meals, not lie down within 2 hours of eating, avoids NSAIDS medications and alcohol.    Vitamin D Deficiency:  Patient is taking Vitamin D2 weekly and doing well.    Seasonal Allergies:  Patient is taking Zyrtec, Albuterol HFA PRN, with good control of symptoms.    ED:  Patient is taking Sildenafil PRN with good results.    History of Present Illness  Past Medical History:   Diagnosis Date    Allergic rhinitis due to allergen 03/01/2019    Arthritis     Arthritis 08/24/2021    Colon polyps     COPD (chronic obstructive pulmonary disease)     Coronary artery calcification 01/03/2022    Erectile dysfunction 10/16/2015    Esophageal reflux disease     Essential  hypertension 08/04/2017    GERD (gastroesophageal reflux disease) 03/01/2019    HLD (hyperlipidemia)     Hypothyroidism 03/17/2016    Limb swelling     Nicotine dependence 08/04/2017    JEAN-PAUL (obstructive sleep apnea) 11/11/2021    Peyronie's disease     Polycythemia secondary to smoking     Prostatitis     Reactive airway disease 11/16/2022    Seasonal allergies     Sinus trouble     SOB (shortness of breath)     Tobacco use disorder 10/16/2015    Vitamin D deficiency 05/25/2021    Wrist sprain 07/08/2015      Family History   Problem Relation Age of Onset    Aneurysm Mother     Heart disease Father     Cancer Father     Nephrolithiasis Father     Lung cancer Father     Cancer Brother     Colon cancer Brother 65    Liver cancer Brother 65    Diabetes Maternal Grandmother     Diabetes Other       Past Surgical History:   Procedure Laterality Date    COLONOSCOPY  2008    COLONOSCOPY N/A 4/22/2024    Procedure: COLONOSCOPY WITH COLD SNARE POLYPECTOMIES;  Surgeon: Silvana Chavez MD;  Location: [facility] ENDOSCOPY;  Service: Gastroenterology;  Laterality: N/A;  COLON POLYPS  HEMORRHOIDS    CYST REMOVAL      CYSTOSCOPY      HEEL SPUR SURGERY      NOSE SURGERY  2011    DR ZAMBRANO    SHOULDER SURGERY      VASECTOMY          Current Outpatient Medications:     ammonium lactate (AMLACTIN) 12 % cream, Apply daily after bathing to arms/legs, Disp: 140 g, Rfl: 11    aspirin 81 MG oral suspension, Take 81 mL by mouth Daily., Disp: , Rfl:     cetirizine (zyrTEC) 10 MG tablet, Take 1 tablet by mouth Daily., Disp: 30 tablet, Rfl: 0    naproxen (NAPROSYN) 500 MG tablet, Take 1 tablet by mouth 2 (Two) Times a Day As Needed for Pain., Disp: 90 tablet, Rfl: 0    rosuvastatin (CRESTOR) 40 MG tablet, Take 1 tablet by mouth every night at bedtime., Disp: 30 tablet, Rfl: 0    vitamin D (ERGOCALCIFEROL) 1.25 MG (69782 UT) capsule capsule, Take 1 capsule by mouth 1 (One) Time Per Week., Disp: 13 capsule, Rfl: 0    albuterol sulfate   "(90 Base) MCG/ACT inhaler, Inhale 2 puffs Every 4 (Four) Hours As Needed for Wheezing or Shortness of Air., Disp: 8.5 g, Rfl: 1    amLODIPine (NORVASC) 10 MG tablet, Take 1 tablet by mouth Daily., Disp: 90 tablet, Rfl: 1    atenolol (TENORMIN) 100 MG tablet, Take 1 tablet by mouth Daily., Disp: 90 tablet, Rfl: 1    ezetimibe (ZETIA) 10 MG tablet, Take 1 tablet by mouth Daily., Disp: 90 tablet, Rfl: 1    lansoprazole (PREVACID) 15 MG capsule, Take 1 capsule by mouth Daily., Disp: 90 capsule, Rfl: 1    levothyroxine (SYNTHROID, LEVOTHROID) 50 MCG tablet, Take 1 tablet by mouth Daily., Disp: 90 tablet, Rfl: 1    sildenafil (Viagra) 100 MG tablet, Take 1 tablet by mouth Daily As Needed for Erectile Dysfunction., Disp: 30 tablet, Rfl: 2    OBJECTIVE  Vital Signs:   /59 (BP Location: Left arm, Patient Position: Sitting, Cuff Size: Large Adult)   Pulse 56   Temp 97.7 °F (36.5 °C) (Temporal)   Ht 177.8 cm (70\")   Wt 88.6 kg (195 lb 6.4 oz)   SpO2 98%   BMI 28.04 kg/m²    Estimated body mass index is 28.04 kg/m² as calculated from the following:    Height as of this encounter: 177.8 cm (70\").    Weight as of this encounter: 88.6 kg (195 lb 6.4 oz).     Wt Readings from Last 3 Encounters:   07/16/24 88.6 kg (195 lb 6.4 oz)   04/22/24 90 kg (198 lb 6.6 oz)   01/09/24 90.7 kg (200 lb)     BP Readings from Last 3 Encounters:   07/16/24 125/59   04/22/24 119/82   01/09/24 126/74       Physical Exam  Vitals reviewed.   Constitutional:       Appearance: Normal appearance. He is well-developed.   HENT:      Head: Normocephalic and atraumatic.      Right Ear: External ear normal.      Left Ear: External ear normal.      Mouth/Throat:      Pharynx: No oropharyngeal exudate.   Eyes:      Conjunctiva/sclera: Conjunctivae normal.      Pupils: Pupils are equal, round, and reactive to light.   Neck:      Vascular: No carotid bruit.   Cardiovascular:      Rate and Rhythm: Normal rate and regular rhythm.      Pulses: Normal " pulses.      Heart sounds: Normal heart sounds. No murmur heard.     No friction rub. No gallop.   Pulmonary:      Effort: Pulmonary effort is normal.      Breath sounds: Normal breath sounds. No wheezing or rhonchi.   Skin:     General: Skin is warm and dry.   Neurological:      Mental Status: He is alert and oriented to person, place, and time.      Cranial Nerves: No cranial nerve deficit.   Psychiatric:         Mood and Affect: Mood and affect normal.         Behavior: Behavior normal.         Thought Content: Thought content normal.         Judgment: Judgment normal.          Result Review        No Images in the past 120 days found..      The above data has been reviewed by BRAN Pruitt 07/16/2024 08:37 EDT.          Patient Care Team:  Margie Belcher APRN as PCP - General (Family Medicine)  Reva Pedro MD as Consulting Physician (Hematology and Oncology)  Tej Mcnulty DO as Consulting Physician (Pulmonary Disease)  Yeison Ibarra MD as Consulting Physician (Cardiology)            ASSESSMENT & PLAN    Diagnoses and all orders for this visit:    1. Essential hypertension (Primary)  Comments:  BP well controlled, continue current medication.  Orders:  -     Comprehensive Metabolic Panel; Future  -     CBC & Differential; Future  -     TSH; Future  -     amLODIPine (NORVASC) 10 MG tablet; Take 1 tablet by mouth Daily.  Dispense: 90 tablet; Refill: 1  -     atenolol (TENORMIN) 100 MG tablet; Take 1 tablet by mouth Daily.  Dispense: 90 tablet; Refill: 1    2. Tobacco abuse  Comments:  Smoke-free for 1 year now, continue inhaler and follow-up with pulmonology  Orders:  -     albuterol sulfate  (90 Base) MCG/ACT inhaler; Inhale 2 puffs Every 4 (Four) Hours As Needed for Wheezing or Shortness of Air.  Dispense: 8.5 g; Refill: 1    3. Seasonal allergic rhinitis, unspecified trigger  Comments:  cont zyrtec prn    4. Hyperlipidemia, unspecified hyperlipidemia  type  Comments:  Tolerating Crestor 20 mg nightly, will check lab  Orders:  -     Lipid Panel; Future  -     ezetimibe (ZETIA) 10 MG tablet; Take 1 tablet by mouth Daily.  Dispense: 90 tablet; Refill: 1    5. Hypothyroidism, unspecified type  Comments:  Stable on synthroid 50mcg daily  Orders:  -     levothyroxine (SYNTHROID, LEVOTHROID) 50 MCG tablet; Take 1 tablet by mouth Daily.  Dispense: 90 tablet; Refill: 1    6. Arthritis  Comments:  Symptoms controlled with as needed naproxen    7. Erectile dysfunction, unspecified erectile dysfunction type  Comments:  Symptoms well controlled with current medication regimen, cont. Current meds.  Orders:  -     sildenafil (Viagra) 100 MG tablet; Take 1 tablet by mouth Daily As Needed for Erectile Dysfunction.  Dispense: 30 tablet; Refill: 2    8. Prostate cancer screening  -     PSA Screen; Future    9. Elevated hemoglobin A1c  -     Hemoglobin A1c; Future    10. Gastroesophageal reflux disease without esophagitis  Comments:  Symptoms well controlled with current medication regimen, cont. Current meds.  Orders:  -     lansoprazole (PREVACID) 15 MG capsule; Take 1 capsule by mouth Daily.  Dispense: 90 capsule; Refill: 1         Tobacco Use: Medium Risk (7/16/2024)    Patient History     Smoking Tobacco Use: Former     Smokeless Tobacco Use: Never     Passive Exposure: Never       Follow Up     Return in about 6 months (around 1/16/2025).      Patient was given instructions and counseling regarding his condition or for health maintenance advice. Please see specific information pulled into the AVS if appropriate.   I have reviewed information obtained and documented by others and I have confirmed the accuracy of this documented note.    BRAN Pruitt

## 2024-08-05 DIAGNOSIS — E55.9 VITAMIN D DEFICIENCY: ICD-10-CM

## 2024-08-05 DIAGNOSIS — E78.5 HYPERLIPIDEMIA, UNSPECIFIED HYPERLIPIDEMIA TYPE: Chronic | ICD-10-CM

## 2024-08-05 RX ORDER — ERGOCALCIFEROL 1.25 MG/1
50000 CAPSULE ORAL WEEKLY
Qty: 13 CAPSULE | Refills: 0 | Status: SHIPPED | OUTPATIENT
Start: 2024-08-05

## 2024-08-05 RX ORDER — ROSUVASTATIN CALCIUM 40 MG/1
40 TABLET, COATED ORAL
Qty: 90 TABLET | Refills: 0 | Status: SHIPPED | OUTPATIENT
Start: 2024-08-05

## 2024-08-08 DIAGNOSIS — E78.5 HYPERLIPIDEMIA, UNSPECIFIED HYPERLIPIDEMIA TYPE: Chronic | ICD-10-CM

## 2024-08-08 DIAGNOSIS — J30.2 SEASONAL ALLERGIC RHINITIS, UNSPECIFIED TRIGGER: ICD-10-CM

## 2024-08-08 RX ORDER — ROSUVASTATIN CALCIUM 40 MG/1
40 TABLET, COATED ORAL
Qty: 90 TABLET | Refills: 0 | OUTPATIENT
Start: 2024-08-08

## 2024-08-08 RX ORDER — CETIRIZINE HYDROCHLORIDE 10 MG/1
10 TABLET ORAL DAILY
Qty: 30 TABLET | Refills: 0 | Status: CANCELLED | OUTPATIENT
Start: 2024-08-08

## 2024-08-08 NOTE — TELEPHONE ENCOUNTER
Please advise. Crestor sent in on 8/5/24, 90 supply given.   LOV 7/16/24  NOV 1/21/25  RF 7/3/24

## 2024-08-15 DIAGNOSIS — J30.2 SEASONAL ALLERGIC RHINITIS, UNSPECIFIED TRIGGER: ICD-10-CM

## 2024-08-15 RX ORDER — CETIRIZINE HYDROCHLORIDE 10 MG/1
10 TABLET ORAL DAILY
Qty: 90 TABLET | Refills: 1 | Status: SHIPPED | OUTPATIENT
Start: 2024-08-15

## 2024-09-06 DIAGNOSIS — E55.9 VITAMIN D DEFICIENCY: ICD-10-CM

## 2024-09-09 RX ORDER — ERGOCALCIFEROL 1.25 MG/1
50000 CAPSULE, LIQUID FILLED ORAL WEEKLY
Qty: 12 CAPSULE | Refills: 1 | Status: SHIPPED | OUTPATIENT
Start: 2024-09-09

## 2025-01-13 ENCOUNTER — OFFICE VISIT (OUTPATIENT)
Dept: CARDIOLOGY | Facility: CLINIC | Age: 65
End: 2025-01-13
Payer: COMMERCIAL

## 2025-01-13 VITALS
HEIGHT: 70 IN | DIASTOLIC BLOOD PRESSURE: 95 MMHG | BODY MASS INDEX: 29.92 KG/M2 | HEART RATE: 57 BPM | SYSTOLIC BLOOD PRESSURE: 148 MMHG | WEIGHT: 209 LBS

## 2025-01-13 DIAGNOSIS — I10 ESSENTIAL HYPERTENSION: ICD-10-CM

## 2025-01-13 DIAGNOSIS — E78.2 MIXED HYPERLIPIDEMIA: Primary | ICD-10-CM

## 2025-01-13 DIAGNOSIS — I25.10 CORONARY ARTERY CALCIFICATION: ICD-10-CM

## 2025-01-13 PROCEDURE — 99214 OFFICE O/P EST MOD 30 MIN: CPT | Performed by: INTERNAL MEDICINE

## 2025-01-13 NOTE — PROGRESS NOTES
CARDIOLOGY FOLLOW-UP PROGRESS NOTE        Chief Complaint  Follow-up, Hyperlipidemia, and Hypertension    Subjective            Reinier Moctezuma presents to [facility]  History of Present Illness    Mr. Moctezuma is here for annual follow-up visit for hypertension, hyperlipidemia and coronary artery calcification.  Overall he feels fine at his baseline.  No recent episodes of chest pain, palpitations or dizziness.  Shortness of breath is well-controlled and he does not use inhalers on a regular basis.  He is taking all the medications as prescribed.    Past History:    1) Hypertension; 2) Hypothyroidism; 3) Hyperlipidemia; 4) COPD, followed by Dr. Mcnulty; 5) Coronary artery calcification 6) Polycythemia     Medical History:  Past Medical History:   Diagnosis Date    Allergic rhinitis due to allergen 03/01/2019    Arthritis     Arthritis 08/24/2021    Colon polyps     COPD (chronic obstructive pulmonary disease)     Coronary artery calcification 01/03/2022    Erectile dysfunction 10/16/2015    Esophageal reflux disease     Essential hypertension 08/04/2017    GERD (gastroesophageal reflux disease) 03/01/2019    HLD (hyperlipidemia)     Hypothyroidism 03/17/2016    Limb swelling     Nicotine dependence 08/04/2017    JEAN-PAUL (obstructive sleep apnea) 11/11/2021    Peyronie's disease     Polycythemia secondary to smoking     Prostatitis     Reactive airway disease 11/16/2022    Seasonal allergies     Sinus trouble     SOB (shortness of breath)     Tobacco use disorder 10/16/2015    Vitamin D deficiency 05/25/2021    Wrist sprain 07/08/2015       Family History: family history includes Aneurysm in his mother; Cancer in his brother and father; Colon cancer (age of onset: 65) in his brother; Diabetes in his maternal grandmother and another family member; Heart disease in his father; Liver cancer (age of onset: 65) in his brother; Lung cancer in his father; Nephrolithiasis in his father.     Social  "History: reports that he quit smoking about 7 years ago. His smoking use included cigars. He has never been exposed to tobacco smoke. He has never used smokeless tobacco. He reports current alcohol use of about 3.0 standard drinks of alcohol per week. He reports current drug use. Drug: Marijuana.    Allergies: Patient has no known allergies.    Current Outpatient Medications on File Prior to Visit   Medication Sig    albuterol sulfate  (90 Base) MCG/ACT inhaler Inhale 2 puffs Every 4 (Four) Hours As Needed for Wheezing or Shortness of Air.    amLODIPine (NORVASC) 10 MG tablet Take 1 tablet by mouth Daily.    ammonium lactate (AMLACTIN) 12 % cream Apply daily after bathing to arms/legs    aspirin 81 MG oral suspension Take 81 mL by mouth Daily.    atenolol (TENORMIN) 100 MG tablet Take 1 tablet by mouth Daily.    cetirizine (zyrTEC) 10 MG tablet Take 1 tablet by mouth Daily.    ezetimibe (ZETIA) 10 MG tablet Take 1 tablet by mouth Daily.    lansoprazole (PREVACID) 15 MG capsule Take 1 capsule by mouth Daily.    levothyroxine (SYNTHROID, LEVOTHROID) 50 MCG tablet Take 1 tablet by mouth Daily.    naproxen (NAPROSYN) 500 MG tablet Take 1 tablet by mouth 2 (Two) Times a Day As Needed for Pain.    rosuvastatin (CRESTOR) 40 MG tablet Take 1 tablet by mouth every night at bedtime.    sildenafil (Viagra) 100 MG tablet Take 1 tablet by mouth Daily As Needed for Erectile Dysfunction.    vitamin D (ERGOCALCIFEROL) 1.25 MG (06082 UT) capsule capsule Take 1 capsule by mouth 1 (One) Time Per Week.     No current facility-administered medications on file prior to visit.          Review of Systems   Respiratory:  Negative for cough, shortness of breath and wheezing.    Cardiovascular:  Negative for chest pain, palpitations and leg swelling.   Gastrointestinal:  Negative for nausea and vomiting.   Neurological:  Negative for dizziness and syncope.        Objective     /95   Pulse 57   Ht 177.8 cm (70\")   Wt 94.8 kg " (209 lb)   BMI 29.99 kg/m²       Physical Exam    General : Alert, awake, no acute distress  Neck : Supple, no carotid bruit, no jugular venous distention  CVS : Regular rate and rhythm, no murmur, rubs or gallops  Lungs: Clear to auscultation bilaterally, no crackles or rhonchi  Abdomen: Soft, nontender, bowel sounds heard in all 4 quadrants  Extremities: Warm, well-perfused, no pedal edema    Result Review :     The following data was reviewed by: Yeison Ibarra MD on 01/13/2025:    CMP          7/16/2024    09:02   CMP   Glucose 124    BUN 11    Creatinine 1.08    EGFR 75.7    Sodium 139    Potassium 5.0    Chloride 104    Calcium 9.2    Total Protein 6.8    Albumin 4.4    Globulin 2.4    Total Bilirubin 0.4    Alkaline Phosphatase 62    AST (SGOT) 21    ALT (SGPT) 35    Albumin/Globulin Ratio 1.8    BUN/Creatinine Ratio 10.2    Anion Gap 9.7      CBC          7/16/2024    09:02   CBC   WBC 4.96    RBC 5.54    Hemoglobin 17.5    Hematocrit 51.6    MCV 93.1    MCH 31.6    MCHC 33.9    RDW 13.0    Platelets 193      TSH          7/16/2024    09:02   TSH   TSH 2.520      Lipid Panel          7/16/2024    09:02   Lipid Panel   Total Cholesterol 137    Triglycerides 237    HDL Cholesterol 35    VLDL Cholesterol 38    LDL Cholesterol  64    LDL/HDL Ratio 1.56           Data reviewed: Cardiology studies        Results for orders placed in visit on 07/06/21    Adult Transthoracic Echo Complete w/ Color, Spectral and Contrast if necessary per protocol    Interpretation Summary  · Normal left ventricle systolic function with a calculated LV ejection fraction of 64%  · Left ventricular diastolic function is consistent with (grade I) impaired relaxation.  · There are no hemodynamically significant valvular abnormalities.                   Assessment and Plan        Diagnoses and all orders for this visit:    1. Mixed hyperlipidemia (Primary)  Assessment & Plan:  Most recent LDL is 64, which is at goal.  Triglycerides  elevated 237.  Dietary changes discussed.  Will continue rosuvastatin with Zetia.      2. Coronary artery calcification  Assessment & Plan:  Incidental finding on previous CT scan of the chest.  Echocardiogram and stress test were unremarkable.  He is currently asymptomatic.  Continue aspirin and statins.      3. Essential hypertension  Assessment & Plan:  Blood pressure mildly elevated in the office today, it was well-controlled during multiple previous visits.  Discussed regarding low-sodium diet and need to keep home blood pressure log.  Will continue amlodipine and atenolol at the current dose.                Follow Up     Return in about 1 year (around 1/13/2026) for Next scheduled follow up.    Patient was given instructions and counseling regarding his condition or for health maintenance advice. Please see specific information pulled into the AVS if appropriate.

## 2025-01-13 NOTE — ASSESSMENT & PLAN NOTE
Most recent LDL is 64, which is at goal.  Triglycerides elevated 237.  Dietary changes discussed.  Will continue rosuvastatin with Zetia.

## 2025-01-13 NOTE — ASSESSMENT & PLAN NOTE
Blood pressure mildly elevated in the office today, it was well-controlled during multiple previous visits.  Discussed regarding low-sodium diet and need to keep home blood pressure log.  Will continue amlodipine and atenolol at the current dose.

## 2025-01-13 NOTE — ASSESSMENT & PLAN NOTE
Incidental finding on previous CT scan of the chest.  Echocardiogram and stress test were unremarkable.  He is currently asymptomatic.  Continue aspirin and statins.

## 2025-01-21 ENCOUNTER — OFFICE VISIT (OUTPATIENT)
Dept: FAMILY MEDICINE CLINIC | Facility: CLINIC | Age: 65
End: 2025-01-21
Payer: COMMERCIAL

## 2025-01-21 VITALS
TEMPERATURE: 97.2 F | BODY MASS INDEX: 29.49 KG/M2 | HEIGHT: 70 IN | SYSTOLIC BLOOD PRESSURE: 114 MMHG | DIASTOLIC BLOOD PRESSURE: 70 MMHG | OXYGEN SATURATION: 98 % | WEIGHT: 206 LBS | HEART RATE: 51 BPM

## 2025-01-21 DIAGNOSIS — Z87.891 HISTORY OF TOBACCO ABUSE: ICD-10-CM

## 2025-01-21 DIAGNOSIS — M19.90 ARTHRITIS: ICD-10-CM

## 2025-01-21 DIAGNOSIS — E03.9 HYPOTHYROIDISM, UNSPECIFIED TYPE: Chronic | ICD-10-CM

## 2025-01-21 DIAGNOSIS — R13.10 DYSPHAGIA, UNSPECIFIED TYPE: ICD-10-CM

## 2025-01-21 DIAGNOSIS — E78.5 HYPERLIPIDEMIA, UNSPECIFIED HYPERLIPIDEMIA TYPE: Chronic | ICD-10-CM

## 2025-01-21 DIAGNOSIS — I10 ESSENTIAL HYPERTENSION: ICD-10-CM

## 2025-01-21 DIAGNOSIS — Z00.00 ANNUAL PHYSICAL EXAM: Primary | ICD-10-CM

## 2025-01-21 DIAGNOSIS — E55.9 VITAMIN D DEFICIENCY: ICD-10-CM

## 2025-01-21 DIAGNOSIS — K21.9 GASTROESOPHAGEAL REFLUX DISEASE WITHOUT ESOPHAGITIS: ICD-10-CM

## 2025-01-21 DIAGNOSIS — N52.9 ERECTILE DYSFUNCTION, UNSPECIFIED ERECTILE DYSFUNCTION TYPE: ICD-10-CM

## 2025-01-21 PROCEDURE — 99397 PER PM REEVAL EST PAT 65+ YR: CPT | Performed by: NURSE PRACTITIONER

## 2025-01-21 PROCEDURE — 99214 OFFICE O/P EST MOD 30 MIN: CPT | Performed by: NURSE PRACTITIONER

## 2025-01-21 RX ORDER — ATENOLOL 100 MG/1
100 TABLET ORAL DAILY
Qty: 90 TABLET | Refills: 1 | Status: SHIPPED | OUTPATIENT
Start: 2025-01-21

## 2025-01-21 RX ORDER — EZETIMIBE 10 MG/1
10 TABLET ORAL DAILY
Qty: 90 TABLET | Refills: 1 | Status: SHIPPED | OUTPATIENT
Start: 2025-01-21

## 2025-01-21 RX ORDER — MECOBALAMIN 5000 MCG
15 TABLET,DISINTEGRATING ORAL DAILY
Qty: 90 CAPSULE | Refills: 1 | Status: SHIPPED | OUTPATIENT
Start: 2025-01-21

## 2025-01-21 RX ORDER — LEVOTHYROXINE SODIUM 50 UG/1
50 TABLET ORAL DAILY
Qty: 90 TABLET | Refills: 1 | Status: SHIPPED | OUTPATIENT
Start: 2025-01-21

## 2025-01-21 RX ORDER — AMLODIPINE BESYLATE 10 MG/1
10 TABLET ORAL DAILY
Qty: 90 TABLET | Refills: 1 | Status: SHIPPED | OUTPATIENT
Start: 2025-01-21

## 2025-01-21 RX ORDER — SILDENAFIL 100 MG/1
100 TABLET, FILM COATED ORAL DAILY PRN
Qty: 30 TABLET | Refills: 2 | Status: SHIPPED | OUTPATIENT
Start: 2025-01-21

## 2025-01-21 RX ORDER — NAPROXEN 500 MG/1
500 TABLET ORAL 2 TIMES DAILY PRN
Qty: 90 TABLET | Refills: 0 | Status: SHIPPED | OUTPATIENT
Start: 2025-01-21

## 2025-01-21 NOTE — PROGRESS NOTES
Chief Complaint  Follow-up (6 months), Hypertension, Hyperlipidemia, Hypothyroidism, Heartburn, Vitamin D Deficiency, Allergies, and Erectile Dysfunction  Annual physical exam  SUBJECTIVE  Reinier Moctezuma presents to [facility]  Annual physical exam    Hypertension:  Patient is taking Atenolol, Amlodipine, ASA.  Patient's Blood Pressure in clinic today is 114/70.  Patient does not monitor blood pressure at home.  Patient denies chest pain, shortness of air, headache, flushing, abnormal swelling in feet/ankles.  Patient's cardiologist is Dr. Ibarra.    Hyperlipidemia:  Patient is taking Zetia, Rosuvastatin.  Patient denies nocturnal leg cramps, myalgias.  Patient attempts to maintain a diet low in fat and carbohydrates.    Hypothyroidism:  Patient takes Levothyroxine.  Patient states he takes medication first thing in the morning on an empty stomach with just a sip of water.  Patient denies weight gain/loss, fatigue, jitters, hair shedding, neck fullness, difficulty swallowing.    Gastroesophageal Reflux:  Patient is taking Lansoprazole, with good control of symptoms.  Patient does not need over the counter medications for breakthrough symptoms.  Patient tries to avoid trigger foods, eat frequent small meals, not lie down within 2 hours of eating, avoids NSAIDS medications and alcohol.    Vitamin D Deficiency:  Patient is taking Vitamin D2 weekly and doing well.    Seasonal Allergies:  Patient is taking Zyrtec, Albuterol HFA, with good control of symptoms.    ED:  Patient is taking Sildenafil PRN with good results.    Patient is complaining of dysphagia symptoms.  States it is very intermittent and random.  States it might happen 3 or 4 days in a row and then nothing for 1 to 2 weeks.  States it happens with various foods and liquids.  States he can always pinpoint the exact spot where he feels it.  States he sometimes will only eat once daily because he feels like food is stuck in  his esophagus.  History of Present Illness  Past Medical History:   Diagnosis Date    Allergic rhinitis due to allergen 03/01/2019    Arthritis     Arthritis 08/24/2021    Colon polyps     COPD (chronic obstructive pulmonary disease)     Coronary artery calcification 01/03/2022    Erectile dysfunction 10/16/2015    Esophageal reflux disease     Essential hypertension 08/04/2017    GERD (gastroesophageal reflux disease) 03/01/2019    HLD (hyperlipidemia)     Hypothyroidism 03/17/2016    Limb swelling     Nicotine dependence 08/04/2017    JEAN-PAUL (obstructive sleep apnea) 11/11/2021    Peyronie's disease     Polycythemia secondary to smoking     Prostatitis     Reactive airway disease 11/16/2022    Seasonal allergies     Sinus trouble     SOB (shortness of breath)     Tobacco use disorder 10/16/2015    Vitamin D deficiency 05/25/2021    Wrist sprain 07/08/2015      Family History   Problem Relation Age of Onset    Aneurysm Mother     Heart disease Father     Cancer Father     Nephrolithiasis Father     Lung cancer Father     Cancer Brother     Colon cancer Brother 65    Liver cancer Brother 65    Diabetes Maternal Grandmother     Diabetes Other       Past Surgical History:   Procedure Laterality Date    COLONOSCOPY  2008    COLONOSCOPY N/A 4/22/2024    Procedure: COLONOSCOPY WITH COLD SNARE POLYPECTOMIES;  Surgeon: Silvana Chavez MD;  Location: [facility] ENDOSCOPY;  Service: Gastroenterology;  Laterality: N/A;  COLON POLYPS  HEMORRHOIDS    CYST REMOVAL      CYSTOSCOPY      HEEL SPUR SURGERY      NOSE SURGERY  2011    DR ZAMBRANO    SHOULDER SURGERY      VASECTOMY          Current Outpatient Medications:     albuterol sulfate  (90 Base) MCG/ACT inhaler, Inhale 2 puffs Every 4 (Four) Hours As Needed for Wheezing or Shortness of Air., Disp: 8.5 g, Rfl: 1    amLODIPine (NORVASC) 10 MG tablet, Take 1 tablet by mouth Daily., Disp: 90 tablet, Rfl: 1    ammonium lactate (AMLACTIN) 12 % cream, Apply daily after bathing to  "arms/legs, Disp: 140 g, Rfl: 11    aspirin 81 MG oral suspension, Take 81 mL by mouth Daily., Disp: , Rfl:     atenolol (TENORMIN) 100 MG tablet, Take 1 tablet by mouth Daily., Disp: 90 tablet, Rfl: 1    cetirizine (zyrTEC) 10 MG tablet, Take 1 tablet by mouth Daily., Disp: 90 tablet, Rfl: 1    ezetimibe (ZETIA) 10 MG tablet, Take 1 tablet by mouth Daily., Disp: 90 tablet, Rfl: 1    lansoprazole (PREVACID) 15 MG capsule, Take 1 capsule by mouth Daily., Disp: 90 capsule, Rfl: 1    levothyroxine (SYNTHROID, LEVOTHROID) 50 MCG tablet, Take 1 tablet by mouth Daily., Disp: 90 tablet, Rfl: 1    naproxen (NAPROSYN) 500 MG tablet, Take 1 tablet by mouth 2 (Two) Times a Day As Needed for Pain., Disp: 90 tablet, Rfl: 0    rosuvastatin (CRESTOR) 40 MG tablet, Take 1 tablet by mouth every night at bedtime., Disp: 90 tablet, Rfl: 0    sildenafil (Viagra) 100 MG tablet, Take 1 tablet by mouth Daily As Needed for Erectile Dysfunction., Disp: 30 tablet, Rfl: 2    vitamin D (ERGOCALCIFEROL) 1.25 MG (65963 UT) capsule capsule, Take 1 capsule by mouth 1 (One) Time Per Week., Disp: 12 capsule, Rfl: 1    OBJECTIVE  Vital Signs:   /70 (BP Location: Left arm, Patient Position: Sitting, Cuff Size: Large Adult)   Pulse 51   Temp 97.2 °F (36.2 °C) (Temporal)   Ht 177.8 cm (70\")   Wt 93.4 kg (206 lb)   SpO2 98%   BMI 29.56 kg/m²    Estimated body mass index is 29.56 kg/m² as calculated from the following:    Height as of this encounter: 177.8 cm (70\").    Weight as of this encounter: 93.4 kg (206 lb).     Wt Readings from Last 3 Encounters:   01/21/25 93.4 kg (206 lb)   01/13/25 94.8 kg (209 lb)   07/16/24 88.6 kg (195 lb 6.4 oz)     BP Readings from Last 3 Encounters:   01/21/25 114/70   01/13/25 148/95   07/16/24 125/59       Physical Exam  Vitals reviewed.   Constitutional:       Appearance: Normal appearance. He is well-developed.   HENT:      Head: Normocephalic and atraumatic.      Right Ear: External ear normal.      Left " Ear: External ear normal.      Mouth/Throat:      Pharynx: No oropharyngeal exudate.   Eyes:      Conjunctiva/sclera: Conjunctivae normal.      Pupils: Pupils are equal, round, and reactive to light.   Neck:      Vascular: No carotid bruit.   Cardiovascular:      Rate and Rhythm: Normal rate and regular rhythm.      Pulses: Normal pulses.      Heart sounds: Normal heart sounds. No murmur heard.     No friction rub. No gallop.   Pulmonary:      Effort: Pulmonary effort is normal.      Breath sounds: Normal breath sounds. No wheezing or rhonchi.   Skin:     General: Skin is warm and dry.   Neurological:      Mental Status: He is alert and oriented to person, place, and time.      Cranial Nerves: No cranial nerve deficit.   Psychiatric:         Mood and Affect: Mood and affect normal.         Behavior: Behavior normal.         Thought Content: Thought content normal.         Judgment: Judgment normal.          Result Review        No Images in the past 120 days found..      The above data has been reviewed by BRAN Pruitt 01/21/2025 09:02 EST.          Patient Care Team:  Margie Belcher APRN as PCP - General (Family Medicine)  Reva Pedro MD as Consulting Physician (Hematology and Oncology)  Tej Mcnulty DO as Consulting Physician (Pulmonary Disease)  Yeison Ibarra MD as Consulting Physician (Cardiology)            ASSESSMENT & PLAN    Diagnoses and all orders for this visit:    1. Annual physical exam (Primary)  -     Comprehensive Metabolic Panel; Future  -     CBC & Differential; Future  -     Lipid Panel; Future  -     TSH+Free T4; Future    2. Essential hypertension  Comments:  BP well controlled, continue current medication.  Orders:  -     amLODIPine (NORVASC) 10 MG tablet; Take 1 tablet by mouth Daily.  Dispense: 90 tablet; Refill: 1  -     atenolol (TENORMIN) 100 MG tablet; Take 1 tablet by mouth Daily.  Dispense: 90 tablet; Refill: 1    3. Hyperlipidemia, unspecified  hyperlipidemia type  Comments:  Tolerating Crestor 20 mg nightly, will check lab  Orders:  -     ezetimibe (ZETIA) 10 MG tablet; Take 1 tablet by mouth Daily.  Dispense: 90 tablet; Refill: 1    4. Gastroesophageal reflux disease without esophagitis  Comments:  Symptoms well controlled with current medication regimen, cont. Current meds.  Orders:  -     lansoprazole (PREVACID) 15 MG capsule; Take 1 capsule by mouth Daily.  Dispense: 90 capsule; Refill: 1    5. Hypothyroidism, unspecified type  Comments:  Stable on synthroid 50mcg daily  Orders:  -     levothyroxine (SYNTHROID, LEVOTHROID) 50 MCG tablet; Take 1 tablet by mouth Daily.  Dispense: 90 tablet; Refill: 1  -     TSH+Free T4; Future    6. Arthritis  Comments:  Symptoms controlled with as needed naproxen  Orders:  -     naproxen (NAPROSYN) 500 MG tablet; Take 1 tablet by mouth 2 (Two) Times a Day As Needed for Pain.  Dispense: 90 tablet; Refill: 0    7. Erectile dysfunction, unspecified erectile dysfunction type  Comments:  Symptoms well controlled with current medication regimen, cont. Current meds.  Orders:  -     sildenafil (Viagra) 100 MG tablet; Take 1 tablet by mouth Daily As Needed for Erectile Dysfunction.  Dispense: 30 tablet; Refill: 2    8. History of tobacco abuse  -      CT Chest Low Dose Cancer Screening WO; Future    9. Dysphagia, unspecified type  -     FL Video Swallow With Speech Single Contrast; Future    10. Vitamin D deficiency  -     Vitamin D 25 hydroxy; Future       The patient is advised to continue current medications, continue current healthy lifestyle patterns, and return for routine annual checkups.    Tobacco Use: Medium Risk (1/21/2025)    Patient History     Smoking Tobacco Use: Former     Smokeless Tobacco Use: Never     Passive Exposure: Never       Follow Up     Return in about 6 months (around 7/21/2025).      Patient was given instructions and counseling regarding his condition or for health maintenance advice. Please see  specific information pulled into the AVS if appropriate.   I have reviewed information obtained and documented by others and I have confirmed the accuracy of this documented note.    Margie Belcher, APRN

## 2025-02-04 ENCOUNTER — LAB (OUTPATIENT)
Dept: LAB | Facility: HOSPITAL | Age: 65
End: 2025-02-04
Payer: COMMERCIAL

## 2025-02-04 DIAGNOSIS — E55.9 VITAMIN D DEFICIENCY: ICD-10-CM

## 2025-02-04 DIAGNOSIS — Z00.00 ANNUAL PHYSICAL EXAM: ICD-10-CM

## 2025-02-04 DIAGNOSIS — E03.9 HYPOTHYROIDISM, UNSPECIFIED TYPE: Chronic | ICD-10-CM

## 2025-02-04 LAB
25(OH)D3 SERPL-MCNC: 48 NG/ML (ref 30–100)
ALBUMIN SERPL-MCNC: 4 G/DL (ref 3.5–5.2)
ALBUMIN/GLOB SERPL: 1.5 G/DL
ALP SERPL-CCNC: 62 U/L (ref 39–117)
ALT SERPL W P-5'-P-CCNC: 43 U/L (ref 1–41)
ANION GAP SERPL CALCULATED.3IONS-SCNC: 10 MMOL/L (ref 5–15)
AST SERPL-CCNC: 30 U/L (ref 1–40)
BASOPHILS # BLD AUTO: 0.09 10*3/MM3 (ref 0–0.2)
BASOPHILS NFR BLD AUTO: 1.8 % (ref 0–1.5)
BILIRUB SERPL-MCNC: 0.6 MG/DL (ref 0–1.2)
BUN SERPL-MCNC: 9 MG/DL (ref 8–23)
BUN/CREAT SERPL: 7.8 (ref 7–25)
CALCIUM SPEC-SCNC: 9.1 MG/DL (ref 8.6–10.5)
CHLORIDE SERPL-SCNC: 105 MMOL/L (ref 98–107)
CHOLEST SERPL-MCNC: 117 MG/DL (ref 0–200)
CO2 SERPL-SCNC: 26 MMOL/L (ref 22–29)
CREAT SERPL-MCNC: 1.15 MG/DL (ref 0.76–1.27)
DEPRECATED RDW RBC AUTO: 42.6 FL (ref 37–54)
EGFRCR SERPLBLD CKD-EPI 2021: 70.2 ML/MIN/1.73
EOSINOPHIL # BLD AUTO: 0.37 10*3/MM3 (ref 0–0.4)
EOSINOPHIL NFR BLD AUTO: 7.5 % (ref 0.3–6.2)
ERYTHROCYTE [DISTWIDTH] IN BLOOD BY AUTOMATED COUNT: 12.8 % (ref 12.3–15.4)
GLOBULIN UR ELPH-MCNC: 2.7 GM/DL
GLUCOSE SERPL-MCNC: 144 MG/DL (ref 65–99)
HCT VFR BLD AUTO: 51.3 % (ref 37.5–51)
HDLC SERPL-MCNC: 30 MG/DL (ref 40–60)
HGB BLD-MCNC: 17.1 G/DL (ref 13–17.7)
IMM GRANULOCYTES # BLD AUTO: 0.01 10*3/MM3 (ref 0–0.05)
IMM GRANULOCYTES NFR BLD AUTO: 0.2 % (ref 0–0.5)
LDLC SERPL CALC-MCNC: 63 MG/DL (ref 0–100)
LDLC/HDLC SERPL: 2.01 {RATIO}
LYMPHOCYTES # BLD AUTO: 1.26 10*3/MM3 (ref 0.7–3.1)
LYMPHOCYTES NFR BLD AUTO: 25.7 % (ref 19.6–45.3)
MCH RBC QN AUTO: 30.7 PG (ref 26.6–33)
MCHC RBC AUTO-ENTMCNC: 33.3 G/DL (ref 31.5–35.7)
MCV RBC AUTO: 92.1 FL (ref 79–97)
MONOCYTES # BLD AUTO: 0.7 10*3/MM3 (ref 0.1–0.9)
MONOCYTES NFR BLD AUTO: 14.3 % (ref 5–12)
NEUTROPHILS NFR BLD AUTO: 2.48 10*3/MM3 (ref 1.7–7)
NEUTROPHILS NFR BLD AUTO: 50.5 % (ref 42.7–76)
NRBC BLD AUTO-RTO: 0 /100 WBC (ref 0–0.2)
PLATELET # BLD AUTO: 222 10*3/MM3 (ref 140–450)
PMV BLD AUTO: 11.6 FL (ref 6–12)
POTASSIUM SERPL-SCNC: 4.5 MMOL/L (ref 3.5–5.2)
PROT SERPL-MCNC: 6.7 G/DL (ref 6–8.5)
RBC # BLD AUTO: 5.57 10*6/MM3 (ref 4.14–5.8)
SODIUM SERPL-SCNC: 141 MMOL/L (ref 136–145)
T4 FREE SERPL-MCNC: 1.28 NG/DL (ref 0.92–1.68)
TRIGL SERPL-MCNC: 133 MG/DL (ref 0–150)
TSH SERPL DL<=0.05 MIU/L-ACNC: 2.71 UIU/ML (ref 0.27–4.2)
VLDLC SERPL-MCNC: 24 MG/DL (ref 5–40)
WBC NRBC COR # BLD AUTO: 4.91 10*3/MM3 (ref 3.4–10.8)

## 2025-02-04 PROCEDURE — 80061 LIPID PANEL: CPT

## 2025-02-04 PROCEDURE — 82306 VITAMIN D 25 HYDROXY: CPT

## 2025-02-04 PROCEDURE — 36415 COLL VENOUS BLD VENIPUNCTURE: CPT

## 2025-02-04 PROCEDURE — 80050 GENERAL HEALTH PANEL: CPT

## 2025-02-04 PROCEDURE — 84439 ASSAY OF FREE THYROXINE: CPT

## 2025-02-05 DIAGNOSIS — R73.09 ELEVATED GLUCOSE: Primary | ICD-10-CM

## 2025-02-18 ENCOUNTER — LAB (OUTPATIENT)
Dept: LAB | Facility: HOSPITAL | Age: 65
End: 2025-02-18
Payer: COMMERCIAL

## 2025-02-18 DIAGNOSIS — R73.09 ELEVATED GLUCOSE: ICD-10-CM

## 2025-02-18 LAB — HBA1C MFR BLD: 6.6 % (ref 4.8–5.6)

## 2025-02-18 PROCEDURE — 83036 HEMOGLOBIN GLYCOSYLATED A1C: CPT

## 2025-02-18 PROCEDURE — 36415 COLL VENOUS BLD VENIPUNCTURE: CPT

## 2025-02-25 ENCOUNTER — OFFICE VISIT (OUTPATIENT)
Dept: FAMILY MEDICINE CLINIC | Facility: CLINIC | Age: 65
End: 2025-02-25
Payer: COMMERCIAL

## 2025-02-25 VITALS
SYSTOLIC BLOOD PRESSURE: 138 MMHG | TEMPERATURE: 97.5 F | OXYGEN SATURATION: 99 % | DIASTOLIC BLOOD PRESSURE: 73 MMHG | BODY MASS INDEX: 29.18 KG/M2 | WEIGHT: 203.8 LBS | HEART RATE: 55 BPM | HEIGHT: 70 IN

## 2025-02-25 DIAGNOSIS — E11.9 NEW ONSET TYPE 2 DIABETES MELLITUS: Primary | ICD-10-CM

## 2025-02-25 DIAGNOSIS — Z23 NEED FOR PNEUMOCOCCAL 20-VALENT CONJUGATE VACCINATION: ICD-10-CM

## 2025-02-25 PROCEDURE — 90677 PCV20 VACCINE IM: CPT | Performed by: NURSE PRACTITIONER

## 2025-02-25 PROCEDURE — 99213 OFFICE O/P EST LOW 20 MIN: CPT | Performed by: NURSE PRACTITIONER

## 2025-02-25 PROCEDURE — G0009 ADMIN PNEUMOCOCCAL VACCINE: HCPCS | Performed by: NURSE PRACTITIONER

## 2025-02-25 NOTE — PROGRESS NOTES
Chief Complaint  New Onset Diabetes Mellitus    SUBJECTIVE  Reinier Moctezuma presents to [facility]    Patient presents to discuss new onset Diabetes Mellitus with an A1c of 6.6% on 2/18/25.    History of Present Illness  Past Medical History:   Diagnosis Date    Allergic rhinitis due to allergen 03/01/2019    Arthritis     Arthritis 08/24/2021    Colon polyps     COPD (chronic obstructive pulmonary disease)     Coronary artery calcification 01/03/2022    Erectile dysfunction 10/16/2015    Esophageal reflux disease     Essential hypertension 08/04/2017    GERD (gastroesophageal reflux disease) 03/01/2019    HLD (hyperlipidemia)     Hypothyroidism 03/17/2016    Limb swelling     Nicotine dependence 08/04/2017    JEAN-PAUL (obstructive sleep apnea) 11/11/2021    Peyronie's disease     Polycythemia secondary to smoking     Prostatitis     Reactive airway disease 11/16/2022    Seasonal allergies     Sinus trouble     SOB (shortness of breath)     Tobacco use disorder 10/16/2015    Vitamin D deficiency 05/25/2021    Wrist sprain 07/08/2015      Family History   Problem Relation Age of Onset    Aneurysm Mother     Heart disease Father     Cancer Father     Nephrolithiasis Father     Lung cancer Father     Cancer Brother     Colon cancer Brother 65    Liver cancer Brother 65    Diabetes Maternal Grandmother     Diabetes Other       Past Surgical History:   Procedure Laterality Date    COLONOSCOPY  2008    COLONOSCOPY N/A 4/22/2024    Procedure: COLONOSCOPY WITH COLD SNARE POLYPECTOMIES;  Surgeon: Silvana Chavez MD;  Location: [facility] ENDOSCOPY;  Service: Gastroenterology;  Laterality: N/A;  COLON POLYPS  HEMORRHOIDS    CYST REMOVAL      CYSTOSCOPY      HEEL SPUR SURGERY      NOSE SURGERY  2011    DR ZAMBRANO    SHOULDER SURGERY      VASECTOMY          Current Outpatient Medications:     albuterol sulfate  (90 Base) MCG/ACT inhaler, Inhale 2 puffs Every 4 (Four) Hours As Needed for  "Wheezing or Shortness of Air., Disp: 8.5 g, Rfl: 1    amLODIPine (NORVASC) 10 MG tablet, Take 1 tablet by mouth Daily., Disp: 90 tablet, Rfl: 1    ammonium lactate (AMLACTIN) 12 % cream, Apply daily after bathing to arms/legs, Disp: 140 g, Rfl: 11    aspirin 81 MG oral suspension, Take 81 mL by mouth Daily., Disp: , Rfl:     atenolol (TENORMIN) 100 MG tablet, Take 1 tablet by mouth Daily., Disp: 90 tablet, Rfl: 1    cetirizine (zyrTEC) 10 MG tablet, Take 1 tablet by mouth Daily., Disp: 90 tablet, Rfl: 1    ezetimibe (ZETIA) 10 MG tablet, Take 1 tablet by mouth Daily., Disp: 90 tablet, Rfl: 1    lansoprazole (PREVACID) 15 MG capsule, Take 1 capsule by mouth Daily., Disp: 90 capsule, Rfl: 1    levothyroxine (SYNTHROID, LEVOTHROID) 50 MCG tablet, Take 1 tablet by mouth Daily., Disp: 90 tablet, Rfl: 1    naproxen (NAPROSYN) 500 MG tablet, Take 1 tablet by mouth 2 (Two) Times a Day As Needed for Pain., Disp: 90 tablet, Rfl: 0    rosuvastatin (CRESTOR) 40 MG tablet, Take 1 tablet by mouth every night at bedtime., Disp: 90 tablet, Rfl: 0    sildenafil (Viagra) 100 MG tablet, Take 1 tablet by mouth Daily As Needed for Erectile Dysfunction., Disp: 30 tablet, Rfl: 2    vitamin D (ERGOCALCIFEROL) 1.25 MG (49305 UT) capsule capsule, Take 1 capsule by mouth 1 (One) Time Per Week., Disp: 12 capsule, Rfl: 1    OBJECTIVE  Vital Signs:   /73 (BP Location: Left arm, Patient Position: Sitting, Cuff Size: Large Adult)   Pulse 55   Temp 97.5 °F (36.4 °C) (Temporal)   Ht 177.8 cm (70\")   Wt 92.4 kg (203 lb 12.8 oz)   SpO2 99%   BMI 29.24 kg/m²    Estimated body mass index is 29.24 kg/m² as calculated from the following:    Height as of this encounter: 177.8 cm (70\").    Weight as of this encounter: 92.4 kg (203 lb 12.8 oz).     Wt Readings from Last 3 Encounters:   02/25/25 92.4 kg (203 lb 12.8 oz)   01/21/25 93.4 kg (206 lb)   01/13/25 94.8 kg (209 lb)     BP Readings from Last 3 Encounters:   02/25/25 138/73   01/21/25 " 114/70   01/13/25 148/95       Physical Exam  Vitals reviewed.   Constitutional:       Appearance: Normal appearance. He is well-developed.   HENT:      Head: Normocephalic and atraumatic.      Right Ear: External ear normal.      Left Ear: External ear normal.      Mouth/Throat:      Pharynx: No oropharyngeal exudate.   Eyes:      Conjunctiva/sclera: Conjunctivae normal.      Pupils: Pupils are equal, round, and reactive to light.   Cardiovascular:      Rate and Rhythm: Normal rate and regular rhythm.      Pulses: Normal pulses.      Heart sounds: Normal heart sounds. No murmur heard.     No friction rub. No gallop.   Pulmonary:      Effort: Pulmonary effort is normal.      Breath sounds: Normal breath sounds. No wheezing or rhonchi.   Skin:     General: Skin is warm and dry.   Neurological:      Mental Status: He is alert and oriented to person, place, and time.      Cranial Nerves: No cranial nerve deficit.   Psychiatric:         Mood and Affect: Mood and affect normal.         Behavior: Behavior normal.         Thought Content: Thought content normal.         Judgment: Judgment normal.          Result Review    CMP          7/16/2024    09:02 2/4/2025    07:26   CMP   Glucose 124  144    BUN 11  9    Creatinine 1.08  1.15    EGFR 75.7  70.2    Sodium 139  141    Potassium 5.0  4.5    Chloride 104  105    Calcium 9.2  9.1    Total Protein 6.8  6.7    Albumin 4.4  4.0    Globulin 2.4  2.7    Total Bilirubin 0.4  0.6    Alkaline Phosphatase 62  62    AST (SGOT) 21  30    ALT (SGPT) 35  43    Albumin/Globulin Ratio 1.8  1.5    BUN/Creatinine Ratio 10.2  7.8    Anion Gap 9.7  10.0      CBC          7/16/2024    09:02 2/4/2025    07:26   CBC   WBC 4.96  4.91    RBC 5.54  5.57    Hemoglobin 17.5  17.1    Hematocrit 51.6  51.3    MCV 93.1  92.1    MCH 31.6  30.7    MCHC 33.9  33.3    RDW 13.0  12.8    Platelets 193  222      Lipid Panel          7/16/2024    09:02 2/4/2025    07:26   Lipid Panel   Total Cholesterol 137   117    Triglycerides 237  133    HDL Cholesterol 35  30    VLDL Cholesterol 38  24    LDL Cholesterol  64  63    LDL/HDL Ratio 1.56  2.01      TSH          7/16/2024    09:02 2/4/2025    07:26   TSH   TSH 2.520  2.710      Most Recent A1C          2/18/2025    07:08   HGBA1C Most Recent   Hemoglobin A1C 6.60        No Images in the past 120 days found..      The above data has been reviewed by BRAN Pruitt 02/25/2025 09:31 EST.          Patient Care Team:  Margie Belcher APRN as PCP - General (Family Medicine)  Reva Pedro MD as Consulting Physician (Hematology and Oncology)  Tej Mcnulty DO as Consulting Physician (Pulmonary Disease)  Yeison Ibarra MD as Consulting Physician (Cardiology)            ASSESSMENT & PLAN    Diagnoses and all orders for this visit:    1. New onset type 2 diabetes mellitus (Primary)  Comments:  Diabetes education packet provided to patient, lifestyle and dietary modifications addressed, declines referral to nutritionist at office visit.  recheck 6 mos    2. Need for pneumococcal 20-valent conjugate vaccination  -     Pneumococcal Conjugate Vaccine 20-Valent (PCV20)       “Discussed risks/benefits to vaccination, reviewed components of the vaccine, discussed VIS, discussed informed consent, informed consent obtained. Patient/Parent was allowed to accept or refuse vaccine. Questions answered to satisfactory state of patient/Parent. We reviewed typical age appropriate and seasonally appropriate vaccinations. Reviewed immunization history and updated state vaccination form as needed. Patient was counseled on Prevnar 20    Tobacco Use: Medium Risk (2/25/2025)    Patient History     Smoking Tobacco Use: Former     Smokeless Tobacco Use: Never     Passive Exposure: Never       Follow Up     Return in about 6 months (around 8/25/2025) for Next scheduled follow up.      Patient was given instructions and counseling regarding his condition or for health maintenance  advice. Please see specific information pulled into the AVS if appropriate.   I have reviewed information obtained and documented by others and I have confirmed the accuracy of this documented note.    Margie Belcher APRN

## 2025-03-11 ENCOUNTER — HOSPITAL ENCOUNTER (OUTPATIENT)
Dept: CT IMAGING | Facility: HOSPITAL | Age: 65
Discharge: HOME | End: 2025-03-11
Payer: COMMERCIAL

## 2025-03-11 ENCOUNTER — HOSPITAL ENCOUNTER (OUTPATIENT)
Dept: GENERAL RADIOLOGY | Facility: HOSPITAL | Age: 65
Discharge: HOME | End: 2025-03-11
Payer: COMMERCIAL

## 2025-03-11 DIAGNOSIS — R13.10 DYSPHAGIA, UNSPECIFIED TYPE: Primary | ICD-10-CM

## 2025-03-11 DIAGNOSIS — Z87.891 HISTORY OF TOBACCO ABUSE: ICD-10-CM

## 2025-03-11 DIAGNOSIS — R13.10 DYSPHAGIA, UNSPECIFIED TYPE: ICD-10-CM

## 2025-03-11 PROCEDURE — 71271 CT THORAX LUNG CANCER SCR C-: CPT

## 2025-03-11 PROCEDURE — 74221 X-RAY XM ESOPHAGUS 2CNTRST: CPT

## 2025-03-11 RX ADMIN — BARIUM SULFATE 355 ML: 0.6 SUSPENSION ORAL at 08:50

## 2025-03-11 RX ADMIN — BARIUM SULFATE 700 MG: 700 TABLET ORAL at 08:50

## 2025-03-11 RX ADMIN — BARIUM SULFATE 135 ML: 980 POWDER, FOR SUSPENSION ORAL at 08:50

## 2025-03-11 RX ADMIN — ANTACID/ANTIFLATULENT 1 PACKET: 380; 550; 10; 10 GRANULE, EFFERVESCENT ORAL at 08:50

## 2025-03-12 ENCOUNTER — TELEPHONE (OUTPATIENT)
Dept: GASTROENTEROLOGY | Facility: CLINIC | Age: 65
End: 2025-03-12
Payer: COMMERCIAL

## 2025-03-12 ENCOUNTER — RESULTS FOLLOW-UP (OUTPATIENT)
Dept: CT IMAGING | Facility: HOSPITAL | Age: 65
End: 2025-03-12
Payer: COMMERCIAL

## 2025-03-12 NOTE — TELEPHONE ENCOUNTER
Left voice message for patient to return call regarding a referral we received from Margie Belcher for Dysphagia, unspecified type. Deferring referral for two days to give patient time to return call.

## 2025-04-09 ENCOUNTER — TELEPHONE (OUTPATIENT)
Dept: GASTROENTEROLOGY | Facility: CLINIC | Age: 65
End: 2025-04-09
Payer: COMMERCIAL

## 2025-04-09 ENCOUNTER — PREP FOR SURGERY (OUTPATIENT)
Dept: OTHER | Facility: HOSPITAL | Age: 65
End: 2025-04-09
Payer: COMMERCIAL

## 2025-04-09 ENCOUNTER — CLINICAL SUPPORT (OUTPATIENT)
Dept: GASTROENTEROLOGY | Facility: CLINIC | Age: 65
End: 2025-04-09
Payer: COMMERCIAL

## 2025-04-09 DIAGNOSIS — R13.10 DYSPHAGIA, UNSPECIFIED TYPE: Primary | ICD-10-CM

## 2025-04-09 NOTE — TELEPHONE ENCOUNTER
2025    Dear Dr Ibarra,      Patient Name: Reinier Moctezuma  : 1960      This patient is waiting to have an Esophagogastroduodenoscopy which I will perform at [facility] on ___2025____. Please respond to this request noting your recommendations regarding clearance from a Cardiac  standpoint.  You may contact our office at [phone] option 3 with any questions. I appreciate your prompt response in this matter. Please return this form to our office as soon as possible to Mary.    ____ I approve my patient from a Cardiac  standpoint    ____ I do NOT approve my patient from a Cardiac  standpoint at this time    Please inform our office if the patient requires additional follow-up from your office prior to scheduled procedure date.      Please specify clearance expiration date:____________________________________      Approving physician name (please print): _____________________________________________      Approving physician signature: ________________________________ Date:________________  Sincerely,  [facility] - Gastroenterology   Dr. Silvana Chavez          Please fax approval or denial to our office as soon as possible.

## 2025-04-09 NOTE — PROGRESS NOTES
Reinier Moctezuma  1960  64 y.o.    Reason for call: Dysphagia  Prep prescribed: N/A  Prep instructions reviewed with patient and sent to patient via regular mail to the home address on file  Is the patient currently on any injectable or oral medications for weight loss or diabetes? No  Clearance needed? Yes  If yes, what clearance is needed? Cardiology and Pulmonary  Clearance has been requested from Dr Ibarra & Dr Mcnulty  The patient has been scheduled for: EGD    After your procedure, you will be contacted with results. Please confirm the best phone # to reach the patient: [phone]  Family history of colon cancer? Yes  If yes, indicate relative: brother  Tentative Procedure Date: 06/06/2025    Date/Place of last Scope: none (EGD)  Able to obtain report? N/A    Family History   Problem Relation Age of Onset    Aneurysm Mother     Heart disease Father     Cancer Father     Nephrolithiasis Father     Lung cancer Father     Cancer Brother     Colon cancer Brother 65    Liver cancer Brother 65    Diabetes Maternal Grandmother     Diabetes Other     Esophageal cancer Neg Hx      Past Medical History:   Diagnosis Date    Allergic rhinitis due to allergen 03/01/2019    Arthritis     Arthritis 08/24/2021    Colon polyps     COPD (chronic obstructive pulmonary disease)     Coronary artery calcification 01/03/2022    Erectile dysfunction 10/16/2015    Esophageal reflux disease     Essential hypertension 08/04/2017    GERD (gastroesophageal reflux disease) 03/01/2019    HLD (hyperlipidemia)     Hypothyroidism 03/17/2016    Limb swelling     Nicotine dependence 08/04/2017    JEAN-PAUL (obstructive sleep apnea) 11/11/2021    Peyronie's disease     Polycythemia secondary to smoking     Prostatitis     Reactive airway disease 11/16/2022    Seasonal allergies     Sinus trouble     SOB (shortness of breath)     Tobacco use disorder 10/16/2015    Vitamin D deficiency 05/25/2021    Wrist sprain 07/08/2015     No Known  Allergies  Past Surgical History:   Procedure Laterality Date    COLONOSCOPY      COLONOSCOPY N/A 2024    Procedure: COLONOSCOPY WITH COLD SNARE POLYPECTOMIES;  Surgeon: Silvana Chavez MD;  Location: [facility] ENDOSCOPY;  Service: Gastroenterology;  Laterality: N/A;  COLON POLYPS  HEMORRHOIDS    CYST REMOVAL      CYSTOSCOPY      HEEL SPUR SURGERY      NOSE SURGERY      DR ZAMBRANO    SHOULDER SURGERY      VASECTOMY       Social History     Socioeconomic History    Marital status:    Tobacco Use    Smoking status: Former     Types: Cigars     Quit date:      Years since quittin.2     Passive exposure: Never    Smokeless tobacco: Never   Vaping Use    Vaping status: Never Used   Substance and Sexual Activity    Alcohol use: Yes     Alcohol/week: 1.0 standard drink of alcohol     Types: 1 Cans of beer per week     Comment: 4-5 BEERS A DAY; DRINKING 21-30 YEARS    Drug use: Yes     Types: Marijuana     Comment: OCC    Sexual activity: Yes     Partners: Female     Birth control/protection: None       Current Outpatient Medications:     albuterol sulfate  (90 Base) MCG/ACT inhaler, Inhale 2 puffs Every 4 (Four) Hours As Needed for Wheezing or Shortness of Air., Disp: 8.5 g, Rfl: 1    amLODIPine (NORVASC) 10 MG tablet, Take 1 tablet by mouth Daily., Disp: 90 tablet, Rfl: 1    ammonium lactate (AMLACTIN) 12 % cream, Apply daily after bathing to arms/legs, Disp: 140 g, Rfl: 11    aspirin 81 MG oral suspension, Take 81 mL by mouth Daily., Disp: , Rfl:     atenolol (TENORMIN) 100 MG tablet, Take 1 tablet by mouth Daily., Disp: 90 tablet, Rfl: 1    cetirizine (zyrTEC) 10 MG tablet, Take 1 tablet by mouth Daily., Disp: 90 tablet, Rfl: 1    ezetimibe (ZETIA) 10 MG tablet, Take 1 tablet by mouth Daily., Disp: 90 tablet, Rfl: 1    lansoprazole (PREVACID) 15 MG capsule, Take 1 capsule by mouth Daily., Disp: 90 capsule, Rfl: 1    levothyroxine (SYNTHROID, LEVOTHROID) 50 MCG tablet, Take 1 tablet  by mouth Daily., Disp: 90 tablet, Rfl: 1    naproxen (NAPROSYN) 500 MG tablet, Take 1 tablet by mouth 2 (Two) Times a Day As Needed for Pain., Disp: 90 tablet, Rfl: 0    rosuvastatin (CRESTOR) 40 MG tablet, Take 1 tablet by mouth every night at bedtime., Disp: 90 tablet, Rfl: 0    sildenafil (Viagra) 100 MG tablet, Take 1 tablet by mouth Daily As Needed for Erectile Dysfunction., Disp: 30 tablet, Rfl: 2    vitamin D (ERGOCALCIFEROL) 1.25 MG (82536 UT) capsule capsule, Take 1 capsule by mouth 1 (One) Time Per Week., Disp: 12 capsule, Rfl: 1

## 2025-04-09 NOTE — TELEPHONE ENCOUNTER
2025    Dear Dr. Mcnulty,      Patient Name: Reinier Moctezuma  : 1960      This patient is waiting to have an Esophagogastroduodenoscopy which I will perform at [facility] on ______. Please respond to this request noting your recommendations regarding clearance from a Pulmonary  standpoint.  You may contact our office at [phone] Option 3 with any questions. I appreciate your prompt response in this matter. Please return this form to our office as soon as possible to Mary.    ____ I approve my patient from a Pulmonary  standpoint    ____ I do NOT approve my patient from a Pulmonary  standpoint at this time    Please inform our office if the patient requires additional follow-up from your office prior to scheduled procedure date.      Please specify clearance expiration date:____________________________________      Approving physician name (please print): _____________________________________________      Approving physician signature: ________________________________ Date:________________  Sincerely,  [facility] - Gastroenterology   Dr. Silvana Chavez          Please fax approval or denial to our office as soon as possible.

## 2025-04-14 NOTE — PROGRESS NOTES
Primary Care Provider  Margie Belcher APRN     Referring Provider  No ref. provider found         Chief Complaint  Surgical Clearance (Scott Daniel 6/6/25)    Subjective          Reinier Moctezuma presents to [facility] PULMONARY [facility]ICINE  History of Present Illness  Reinier Moctezuma is a 65 y.o. male patient of Dr. Mcnulty with asthma and shortness of breath.  He is needing surgical clearance for an Esophagogastroduodenoscopy with Dr. Chavez on 6/6/2025.    Patient states he is doing well.  He denies using antibiotics or steroids for his lungs.  He denies any current fevers or chills.  He denies any significant shortness of breath.  He uses his Anoro as needed.  He has never used his albuterol.  He continues to wear his CPAP and is benefiting from its use.  Overall, he is doing well and has no concerns today.  Patient has had an EGD in the past and tolerated the procedure well.           His history of smoking is   Tobacco Use: Medium Risk (4/15/2025)    Patient History     Smoking Tobacco Use: Former     Smokeless Tobacco Use: Never     Passive Exposure: Past   .    Review of Systems   Constitutional:  Negative for chills, fatigue, fever, unexpected weight gain and unexpected weight loss.   HENT:  Congestion: Nasal.    Respiratory:  Negative for apnea, cough, shortness of breath and wheezing.         Negative for Hemoptysis     Cardiovascular:  Negative for chest pain, palpitations and leg swelling.   Skin:         Negative for cyanosis      Sleep: Negative for Excessive daytime sleepiness  Negative for morning headaches  Negative for Snoring    Family History   Problem Relation Age of Onset    Aneurysm Mother     Heart disease Father     Cancer Father     Nephrolithiasis Father     Lung cancer Father     Cancer Brother     Colon cancer Brother 65    Liver cancer Brother 65    Diabetes Maternal Grandmother     Diabetes Other     Esophageal cancer Neg Hx         Social History      Socioeconomic History    Marital status:    Tobacco Use    Smoking status: Former     Types: Cigars     Quit date:      Years since quittin.2     Passive exposure: Past    Smokeless tobacco: Never   Vaping Use    Vaping status: Never Used   Substance and Sexual Activity    Alcohol use: Yes     Alcohol/week: 1.0 standard drink of alcohol     Types: 1 Cans of beer per week     Comment: 4-5 BEERS A DAY; DRINKING 21-30 YEARS    Drug use: Yes     Types: Marijuana     Comment: OCC    Sexual activity: Yes     Partners: Female     Birth control/protection: None        Past Medical History:   Diagnosis Date    Allergic rhinitis due to allergen 2019    Arthritis     Arthritis 2021    Colon polyps     COPD (chronic obstructive pulmonary disease)     Coronary artery calcification 2022    Erectile dysfunction 10/16/2015    Esophageal reflux disease     Essential hypertension 2017    GERD (gastroesophageal reflux disease) 2019    HLD (hyperlipidemia)     Hypothyroidism 2016    Limb swelling     Nicotine dependence 2017    JEAN-PAUL (obstructive sleep apnea) 2021    Peyronie's disease     Polycythemia secondary to smoking     Prostatitis     Reactive airway disease 2022    Seasonal allergies     Sinus trouble     SOB (shortness of breath)     Tobacco use disorder 10/16/2015    Vitamin D deficiency 2021    Wrist sprain 2015        Immunization History   Administered Date(s) Administered    Fluzone (or Fluarix & Flulaval for VFC) >6mos 2021, 2022    Fluzone High-Dose 65+yrs 2023    Fluzone Quad >6mos (Multi-dose) 10/22/2020    Hepatitis A 2019    Influenza, Unspecified 2022    Pneumococcal Conjugate 20-Valent (PCV20) 2025    Pneumococcal Polysaccharide (PPSV23) 2021         No Known Allergies       Current Outpatient Medications:     albuterol sulfate  (90 Base) MCG/ACT inhaler, Inhale 2 puffs Every 4  (Four) Hours As Needed for Wheezing or Shortness of Air., Disp: 8.5 g, Rfl: 1    amLODIPine (NORVASC) 10 MG tablet, Take 1 tablet by mouth Daily., Disp: 90 tablet, Rfl: 1    ammonium lactate (AMLACTIN) 12 % cream, Apply daily after bathing to arms/legs, Disp: 140 g, Rfl: 11    aspirin 81 MG oral suspension, Take 81 mL by mouth Daily., Disp: , Rfl:     atenolol (TENORMIN) 100 MG tablet, Take 1 tablet by mouth Daily., Disp: 90 tablet, Rfl: 1    cetirizine (zyrTEC) 10 MG tablet, Take 1 tablet by mouth Daily., Disp: 90 tablet, Rfl: 1    ezetimibe (ZETIA) 10 MG tablet, Take 1 tablet by mouth Daily., Disp: 90 tablet, Rfl: 1    lansoprazole (PREVACID) 15 MG capsule, Take 1 capsule by mouth Daily., Disp: 90 capsule, Rfl: 1    levothyroxine (SYNTHROID, LEVOTHROID) 50 MCG tablet, Take 1 tablet by mouth Daily., Disp: 90 tablet, Rfl: 1    naproxen (NAPROSYN) 500 MG tablet, Take 1 tablet by mouth 2 (Two) Times a Day As Needed for Pain., Disp: 90 tablet, Rfl: 0    rosuvastatin (CRESTOR) 40 MG tablet, Take 1 tablet by mouth every night at bedtime., Disp: 90 tablet, Rfl: 0    sildenafil (Viagra) 100 MG tablet, Take 1 tablet by mouth Daily As Needed for Erectile Dysfunction., Disp: 30 tablet, Rfl: 2    vitamin D (ERGOCALCIFEROL) 1.25 MG (66931 UT) capsule capsule, Take 1 capsule by mouth 1 (One) Time Per Week., Disp: 12 capsule, Rfl: 1     Objective   Physical Exam  Constitutional:       General: He is not in acute distress.     Appearance: Normal appearance. He is normal weight.   HENT:      Right Ear: Hearing normal.      Left Ear: Hearing normal.      Nose: No nasal tenderness or congestion.      Mouth/Throat:      Mouth: Mucous membranes are moist. No oral lesions.   Eyes:      Extraocular Movements: Extraocular movements intact.      Pupils: Pupils are equal, round, and reactive to light.   Cardiovascular:      Rate and Rhythm: Normal rate and regular rhythm.      Pulses: Normal pulses.      Heart sounds: Normal heart sounds.  "No murmur heard.  Pulmonary:      Effort: Pulmonary effort is normal.      Breath sounds: Normal breath sounds. No wheezing, rhonchi or rales.   Musculoskeletal:      Right lower leg: No edema.      Left lower leg: No edema.   Skin:     General: Skin is warm and dry.      Findings: No lesion or rash.   Neurological:      General: No focal deficit present.      Mental Status: He is alert and oriented to person, place, and time.   Psychiatric:         Mood and Affect: Affect normal. Mood is not anxious or depressed.         Vital Signs:   /80 (BP Location: Left arm, Patient Position: Sitting, Cuff Size: Adult)   Pulse 52   Temp 97.9 °F (36.6 °C) (Oral)   Resp 16   Ht 177.8 cm (70\")   Wt 93 kg (205 lb)   SpO2 96% Comment: Room air  BMI 29.41 kg/m²        Result Review :   The following data was reviewed by: BRAN Su on 04/15/2025:  CMP          7/16/2024    09:02 2/4/2025    07:26   CMP   Glucose 124  144    BUN 11  9    Creatinine 1.08  1.15    EGFR 75.7  70.2    Sodium 139  141    Potassium 5.0  4.5    Chloride 104  105    Calcium 9.2  9.1    Total Protein 6.8  6.7    Albumin 4.4  4.0    Globulin 2.4  2.7    Total Bilirubin 0.4  0.6    Alkaline Phosphatase 62  62    AST (SGOT) 21  30    ALT (SGPT) 35  43    Albumin/Globulin Ratio 1.8  1.5    BUN/Creatinine Ratio 10.2  7.8    Anion Gap 9.7  10.0      CBC w/diff          7/16/2024    09:02 2/4/2025    07:26   CBC w/Diff   WBC 4.96  4.91    RBC 5.54  5.57    Hemoglobin 17.5  17.1    Hematocrit 51.6  51.3    MCV 93.1  92.1    MCH 31.6  30.7    MCHC 33.9  33.3    RDW 13.0  12.8    Platelets 193  222    Neutrophil Rel % 54.0  50.5    Immature Granulocyte Rel % 0.4  0.2    Lymphocyte Rel % 22.8  25.7    Monocyte Rel % 14.9  14.3    Eosinophil Rel % 6.3  7.5    Basophil Rel % 1.6  1.8      Data reviewed : Radiologic studies chest CT 3/11/2025  and Dr. Mcnulty's last office note    Procedures        Assessment and Plan    Diagnoses and all orders for " this visit:    1. Mild intermittent reactive airway disease without complication (Primary)  Comments:  continue albuterol as needed    2. Shortness of breath  Comments:  continue albuterol as needed    3. Tobacco abuse, in remission  Comments:  LDCT managed by PCP    4. Preoperative clearance  Comments:  cleared for EGD on 6/6/2025    5. JEAN-PAUL on CPAP  Comments:  continue CPAP.  Patient using and benefiting.      Patient cleared for EGD with minimal risk.  Patient does have sleep apnea and wears a CPAP  Assessment & Plan            Follow Up   Return if symptoms worsen or fail to improve.  Patient was given instructions and counseling regarding his condition or for health maintenance advice. Please see specific information pulled into the AVS if appropriate.

## 2025-04-15 ENCOUNTER — OFFICE VISIT (OUTPATIENT)
Dept: PULMONOLOGY | Facility: CLINIC | Age: 65
End: 2025-04-15
Payer: COMMERCIAL

## 2025-04-15 VITALS
BODY MASS INDEX: 29.35 KG/M2 | RESPIRATION RATE: 16 BRPM | HEART RATE: 52 BPM | OXYGEN SATURATION: 96 % | SYSTOLIC BLOOD PRESSURE: 122 MMHG | WEIGHT: 205 LBS | HEIGHT: 70 IN | DIASTOLIC BLOOD PRESSURE: 80 MMHG | TEMPERATURE: 97.9 F

## 2025-04-15 DIAGNOSIS — G47.33 OSA ON CPAP: ICD-10-CM

## 2025-04-15 DIAGNOSIS — R06.02 SHORTNESS OF BREATH: ICD-10-CM

## 2025-04-15 DIAGNOSIS — Z01.818 PREOPERATIVE CLEARANCE: ICD-10-CM

## 2025-04-15 DIAGNOSIS — J45.20 MILD INTERMITTENT REACTIVE AIRWAY DISEASE WITHOUT COMPLICATION: Primary | Chronic | ICD-10-CM

## 2025-04-15 DIAGNOSIS — F17.201 TOBACCO ABUSE, IN REMISSION: ICD-10-CM

## 2025-04-30 ENCOUNTER — TELEPHONE (OUTPATIENT)
Dept: GASTROENTEROLOGY | Facility: CLINIC | Age: 65
End: 2025-04-30
Payer: COMMERCIAL

## 2025-04-30 NOTE — TELEPHONE ENCOUNTER
ENDO RECONCILIATION  Verify source of procedure(s): Nurse/MA direct access  If other, please list source:     TIME OUT-CONFIRM CORRECT PROCEDURE: EGD  Cardiology: Stacey  Pulmonology: Pricila  Blood thinner:   GLP-1:  Additional DX/indication for procedure:     Please include any other notes relevant to endo reconciliation:     Pulmonary clearance received from Dr Mcnulty on 4/16/25 (TE 4/9) expires in 90 days.   Cardiac clearance received from Dr Ibarra on 4/17/25 (TE 4/9) expires in 60 days.

## 2025-05-29 NOTE — PAT
Arrival time of  0600 given.    Come to [facility] entrance, entrance C. Bring picture ID and insurance care. Have licensed  for transportation home after the procedure.    Nothing to eat or drink after midnight.     Hold all medications the morning of the procedure except nebulizers or inhalers.     Bring mediations and inhalers to hospital with you.     Plan to be here 3-4 hours. Do not bring valuables with you to hospital.     Pt verbalized understanding of instructions.

## 2025-06-05 ENCOUNTER — ANESTHESIA EVENT (OUTPATIENT)
Dept: GASTROENTEROLOGY | Facility: HOSPITAL | Age: 65
End: 2025-06-05
Payer: COMMERCIAL

## 2025-06-05 NOTE — ANESTHESIA PREPROCEDURE EVALUATION
Anesthesia Evaluation     Patient summary reviewed and Nursing notes reviewed   NPO Solid Status: > 8 hours  NPO Liquid Status: > 8 hours           Airway   Mallampati: II  TM distance: >3 FB  Neck ROM: full  Possible difficult intubation and Small opening  Dental - normal exam     Pulmonary     breath sounds clear to auscultation  (+) a smoker (former) Former, COPD mild,shortness of breath, sleep apnea on CPAP  Cardiovascular - normal exam  Exercise tolerance: good (4-7 METS)    ECG reviewed  Patient on routine beta blocker  Rhythm: regular  Rate: normal    (+) hypertension well controlled, CAD, hyperlipidemia      Neuro/Psych  (+) psychiatric history  GI/Hepatic/Renal/Endo    (+) GERD well controlled, thyroid problem hypothyroidism    Musculoskeletal     Abdominal    Substance History   (+) alcohol use (5 beers a day x21-30 years. marijuana use hx)     OB/GYN negative ob/gyn ROS         Other   arthritis,     ROS/Med Hx Other: Pulmonary clearance received from Dr Mcnulty on 4/16/25 (TE 4/9) expires in 90 days.   Cardiac clearance received from Dr Ibarra on 4/17/25 (TE 4/9) expires in 60 days.     Echo 7/7/21  Left Ventricle Calculated left ventricular EF = 64% Estimated left ventricular EF was in agreement with the calculated left ventricular EF. Left ventricular systolic function is normal.   Septal wall motion is normal. Normal left ventricular cavity size and wall thickness noted. All left ventricular wall segments contract normally. Left ventricular diastolic function is consistent with (grade I) impaired relaxation.  Right Ventricle Normal right ventricular cavity size noted.  Left Atrium Normal left atrial size and volume noted. No evidence of left atrial thrombus or mass present.  Right Atrium Normal right atrial cavity size noted. The diameter of the inferior vena cava is 1 cm.  Aortic Valve The aortic valve is structurally normal with no regurgitation or stenosis present. The aortic valve appears  trileaflet.  Mitral Valve The mitral valve is normal in structure. There is no evidence of mitral valve prolapse. Trace mitral valve regurgitation is present.  Tricuspid Valve The tricuspid valve is normal in structure. Trace tricuspid valve regurgitation is present.  Pulmonic Valve The pulmonic valve is grossly normal in structure. There is trace pulmonic valve regurgitation present.  Greater Vessels The inferior vena cava is normally sized. Normal IVC inspiratory collapse of greater than 50% noted.  Pericardium The pericardium is normal. There is no evidence of pericardial effusion. .    EKG 12/01/20: HR 58, SR, minimal ST elevation, inferior leads       Phys Exam Other: Beard               Anesthesia Plan    ASA 3     general   total IV anesthesia  (Total IV Anesthesia    Patient understands anesthesia not responsible for dental damage.      Discussed risks with pt including aspiration, allergic reactions, apnea, advanced airway placement. Pt verbalized understanding. All questions answered.     )  intravenous induction     Anesthetic plan, risks, benefits, and alternatives have been provided, discussed and informed consent has been obtained with: patient and spouse/significant other.  Pre-procedure education provided  Plan discussed with CRNA.      CODE STATUS:

## 2025-06-06 ENCOUNTER — HOSPITAL ENCOUNTER (OUTPATIENT)
Facility: HOSPITAL | Age: 65
Setting detail: HOSPITAL OUTPATIENT SURGERY
Discharge: HOME | End: 2025-06-06
Attending: INTERNAL MEDICINE | Admitting: INTERNAL MEDICINE
Payer: COMMERCIAL

## 2025-06-06 ENCOUNTER — ANESTHESIA (OUTPATIENT)
Dept: GASTROENTEROLOGY | Facility: HOSPITAL | Age: 65
End: 2025-06-06
Payer: COMMERCIAL

## 2025-06-06 VITALS
WEIGHT: 191.8 LBS | BODY MASS INDEX: 27.46 KG/M2 | RESPIRATION RATE: 20 BRPM | TEMPERATURE: 97.7 F | HEIGHT: 70 IN | DIASTOLIC BLOOD PRESSURE: 96 MMHG | OXYGEN SATURATION: 96 % | SYSTOLIC BLOOD PRESSURE: 150 MMHG | HEART RATE: 63 BPM

## 2025-06-06 DIAGNOSIS — R13.10 DYSPHAGIA, UNSPECIFIED TYPE: ICD-10-CM

## 2025-06-06 PROCEDURE — C1726 CATH, BAL DIL, NON-VASCULAR: HCPCS | Performed by: INTERNAL MEDICINE

## 2025-06-06 PROCEDURE — 43239 EGD BIOPSY SINGLE/MULTIPLE: CPT | Performed by: INTERNAL MEDICINE

## 2025-06-06 PROCEDURE — 25810000003 LACTATED RINGERS PER 1000 ML

## 2025-06-06 PROCEDURE — 25010000002 PROPOFOL 10 MG/ML EMULSION

## 2025-06-06 PROCEDURE — 88305 TISSUE EXAM BY PATHOLOGIST: CPT | Performed by: INTERNAL MEDICINE

## 2025-06-06 PROCEDURE — 25010000002 LIDOCAINE PF 2% 2 % SOLUTION

## 2025-06-06 RX ORDER — LIDOCAINE HYDROCHLORIDE 20 MG/ML
INJECTION, SOLUTION EPIDURAL; INFILTRATION; INTRACAUDAL; PERINEURAL AS NEEDED
Status: DISCONTINUED | OUTPATIENT
Start: 2025-06-06 | End: 2025-06-06 | Stop reason: SURG

## 2025-06-06 RX ORDER — SODIUM CHLORIDE, SODIUM LACTATE, POTASSIUM CHLORIDE, CALCIUM CHLORIDE 600; 310; 30; 20 MG/100ML; MG/100ML; MG/100ML; MG/100ML
30 INJECTION, SOLUTION INTRAVENOUS CONTINUOUS
Status: DISCONTINUED | OUTPATIENT
Start: 2025-06-06 | End: 2025-06-06 | Stop reason: HOSPADM

## 2025-06-06 RX ORDER — PROPOFOL 10 MG/ML
VIAL (ML) INTRAVENOUS AS NEEDED
Status: DISCONTINUED | OUTPATIENT
Start: 2025-06-06 | End: 2025-06-06 | Stop reason: SURG

## 2025-06-06 RX ADMIN — PROPOFOL 30 MG: 10 INJECTION, EMULSION INTRAVENOUS at 07:30

## 2025-06-06 RX ADMIN — PROPOFOL 100 MG: 10 INJECTION, EMULSION INTRAVENOUS at 07:27

## 2025-06-06 RX ADMIN — PROPOFOL 250 MCG/KG/MIN: 10 INJECTION, EMULSION INTRAVENOUS at 07:28

## 2025-06-06 RX ADMIN — SODIUM CHLORIDE, POTASSIUM CHLORIDE, SODIUM LACTATE AND CALCIUM CHLORIDE 30 ML/HR: 600; 310; 30; 20 INJECTION, SOLUTION INTRAVENOUS at 06:41

## 2025-06-06 RX ADMIN — LIDOCAINE HYDROCHLORIDE 50 MG: 20 INJECTION, SOLUTION EPIDURAL; INFILTRATION; INTRACAUDAL; PERINEURAL at 07:27

## 2025-06-06 NOTE — H&P
Pre Procedure History & Physical    Chief Complaint:   Dysphagia    Subjective     HPI:   66 yo M here for eval of dysphagia.    Past Medical History:   Past Medical History:   Diagnosis Date    Allergic rhinitis due to allergen 03/01/2019    Arthritis     Arthritis 08/24/2021    Colon polyps     COPD (chronic obstructive pulmonary disease)     Coronary artery calcification 01/03/2022    Erectile dysfunction 10/16/2015    Esophageal reflux disease     Essential hypertension 08/04/2017    GERD (gastroesophageal reflux disease) 03/01/2019    HLD (hyperlipidemia)     Hypothyroidism 03/17/2016    Limb swelling     Nicotine dependence 08/04/2017    JEAN-PAUL (obstructive sleep apnea) 11/11/2021    Peyronie's disease     Polycythemia secondary to smoking     Prostatitis     Reactive airway disease 11/16/2022    Seasonal allergies     Sinus trouble     SOB (shortness of breath)     Tobacco use disorder 10/16/2015    Vitamin D deficiency 05/25/2021    Wrist sprain 07/08/2015       Past Surgical History:  Past Surgical History:   Procedure Laterality Date    COLONOSCOPY  2008    COLONOSCOPY N/A 4/22/2024    Procedure: COLONOSCOPY WITH COLD SNARE POLYPECTOMIES;  Surgeon: Silvana Chavez MD;  Location: [facility] ENDOSCOPY;  Service: Gastroenterology;  Laterality: N/A;  COLON POLYPS  HEMORRHOIDS    CYST REMOVAL      CYSTOSCOPY      HEEL SPUR SURGERY      NOSE SURGERY  2011    DR ZAMBRANO    SHOULDER SURGERY      VASECTOMY         Family History:  Family History   Problem Relation Age of Onset    Aneurysm Mother     Heart disease Father     Cancer Father     Nephrolithiasis Father     Lung cancer Father     Cancer Brother     Colon cancer Brother 65    Liver cancer Brother 65    Diabetes Maternal Grandmother     Diabetes Other     Esophageal cancer Neg Hx        Social History:   reports that he quit smoking about 7 years ago. His smoking use included cigars. He has been exposed to tobacco smoke. He has never used smokeless tobacco.  "He reports current alcohol use of about 1.0 standard drink of alcohol per week. He reports current drug use. Drug: Marijuana.    Medications:   Medications Prior to Admission   Medication Sig Dispense Refill Last Dose/Taking    amLODIPine (NORVASC) 10 MG tablet Take 1 tablet by mouth Daily. 90 tablet 1 Past Week    ammonium lactate (AMLACTIN) 12 % cream Apply daily after bathing to arms/legs 140 g 11 Past Month    aspirin 81 MG oral suspension Take 81 mL by mouth Daily.   Past Week    atenolol (TENORMIN) 100 MG tablet Take 1 tablet by mouth Daily. 90 tablet 1 6/4/2025    cetirizine (zyrTEC) 10 MG tablet Take 1 tablet by mouth Daily. 90 tablet 1 Past Month    ezetimibe (ZETIA) 10 MG tablet Take 1 tablet by mouth Daily. 90 tablet 1 Past Week    lansoprazole (PREVACID) 15 MG capsule Take 1 capsule by mouth Daily. 90 capsule 1 Past Week    levothyroxine (SYNTHROID, LEVOTHROID) 50 MCG tablet Take 1 tablet by mouth Daily. 90 tablet 1 Past Week    naproxen (NAPROSYN) 500 MG tablet Take 1 tablet by mouth 2 (Two) Times a Day As Needed for Pain. 90 tablet 0 Past Month    rosuvastatin (CRESTOR) 40 MG tablet Take 1 tablet by mouth every night at bedtime. 90 tablet 0 Past Week    vitamin D (ERGOCALCIFEROL) 1.25 MG (62675 UT) capsule capsule Take 1 capsule by mouth 1 (One) Time Per Week. 12 capsule 1 Past Week    albuterol sulfate  (90 Base) MCG/ACT inhaler Inhale 2 puffs Every 4 (Four) Hours As Needed for Wheezing or Shortness of Air. 8.5 g 1 More than a month    sildenafil (Viagra) 100 MG tablet Take 1 tablet by mouth Daily As Needed for Erectile Dysfunction. 30 tablet 2        Allergies:  Patient has no known allergies.    ROS:    Pertinent items are noted in HPI     Objective     Blood pressure 162/88, pulse 64, temperature 97.9 °F (36.6 °C), temperature source Temporal, resp. rate 16, height 177.8 cm (70\"), weight 87 kg (191 lb 12.8 oz), SpO2 95%.    Physical Exam   Constitutional: Pt is oriented to person, place, and " time and well-developed, well-nourished, and in no distress.   Mouth/Throat: Oropharynx is clear and moist.   Neck: Normal range of motion.   Cardiovascular: Normal rate, regular rhythm and normal heart sounds.    Pulmonary/Chest: Effort normal and breath sounds normal.   Abdominal: Soft. Nontender  Skin: Skin is warm and dry.   Psychiatric: Mood, memory, affect and judgment normal.     Assessment & Plan     Diagnosis:  Dysphagia    Anticipated Surgical Procedure:  EGD    The risks, benefits, and alternatives of this procedure have been discussed with the patient or the responsible party- the patient understands and agrees to proceed.

## 2025-06-06 NOTE — ANESTHESIA POSTPROCEDURE EVALUATION
Patient: Reinier Moctezuma    Procedure Summary       Date: 06/06/25 Room / Location: [facility] ENDOSCOPY 1 / [facility] ENDOSCOPY    Anesthesia Start: 0723 Anesthesia Stop: 0744    Procedure: ESOPHAGOGASTRODUODENOSCOPY with biopsies, DILATION TO 20MM Diagnosis:       Dysphagia, unspecified type      (Dysphagia, unspecified type [R13.10])    Surgeons: Silvana Chavez MD Provider: Bharati Valdez CRNA    Anesthesia Type: general ASA Status: 3            Anesthesia Type: general    Vitals  Vitals Value Taken Time   /96 06/06/25 07:58   Temp 36.5 °C (97.7 °F) 06/06/25 07:42   Pulse 58 06/06/25 07:59   Resp 20 06/06/25 07:57   SpO2 98 % 06/06/25 07:59   Vitals shown include unfiled device data.        Post Anesthesia Care and Evaluation    Post-procedure mental status: acceptable.  Pain management: satisfactory to patient    Airway patency: patent  Anesthetic complications: No anesthetic complications    Cardiovascular status: acceptable  Respiratory status: acceptable, spontaneous ventilation and room air    Comments: Per chart review

## 2025-06-09 ENCOUNTER — RESULTS FOLLOW-UP (OUTPATIENT)
Dept: GASTROENTEROLOGY | Facility: HOSPITAL | Age: 65
End: 2025-06-09
Payer: COMMERCIAL

## 2025-06-09 DIAGNOSIS — R19.8 ABNORMAL FINDINGS ON ESOPHAGOGASTRODUODENOSCOPY (EGD): Primary | ICD-10-CM

## 2025-06-09 NOTE — TELEPHONE ENCOUNTER
"Weinberg's esophagus occurs when the normal cells that line the lower part of the esophagus (called squamous cells) are replaced by a different cell type (called intestinal cells).  This is called intestinal metaplasia.    Intestinal metaplasia:  the transition of cells into a different normal type of cell, but can be associated with an increased risk of cancer.  This is a very slow process of cellular change.  General recommendations to decrease risk of cancer would be smoking cessation, abstaining from alcohol, and avoiding NSAIDs.    Intestinal metaplasia in the esophagus usually occurs as a result of repetitive damage to the inside of the esophagus caused by longstanding gastroesophageal reflux disease (GERD). The intestinal cells of Weinberg's esophagus are more resistant to acid and bile than squamous cells, suggesting that these cells may develop to protect the esophagus from acid exposure.    Other risk factors for developing Weinberg's esophagus include age, gender, smoking, and family history.     It's important to know if you have Weinberg's as it can later turn into pre-cancer or cancer of the esophagus.    The symptoms that may be seen are usually symptoms caused by the acid reflux:  heartburn, burning in the throat or acid taste in the throat, vomiting after eating or difficulty swallowing.    Treatment is geared towards reducing or getting rid of acid reflux:  PPI medication, avoid foods that can trigger reflux (caffeine, alcohol, chocolate, peppermint, fatty foods), avoid laying down within 3 hours of eating, raise head of bed, lose weight - especially in abdominal area.    Confirmed patient is taking Prevacid. Educated on why and how best to take PPI medications.      Patient was prescribed naproxen.  Advised that this is an NSAID.  Patient agrees to speak with PCP regarding possible alternative therapy.  States he hasn't taken \"but 2 pills in the last 3 months\".    Treatment does not usually cure " Weinberg's esophagus, but it keeps it from getting worse.    Scheduled follow up with BRAN Nguyen on 08.07.25 at 0930 -- however patient would like a Monday, Tuesday, or early AM Wednesday appointment.  He is out of town most weeks from Wednesday afternoon through Sunday.  Advised we will check for an opening and follow back with him.    Patient is aware of need for CT, states he will wait for a call to have it scheduled.  Advised patient to reach out to our office if he does not receive a call this week to schedule.  Patient verbalized understanding.     Care Gap updated:  1 year EGD recall placed

## 2025-06-11 NOTE — TELEPHONE ENCOUNTER
BRAN Nguyen approved moving office visit to September to accommodate patient's needs.    Office visit is now scheduled on Tuesday, 09.16.25 at 0845.    _______________    Spoke with patient and advised of change, patient verbalized understanding.    Advised patient to reach out to our office with any GI needs.

## 2025-06-17 ENCOUNTER — HOSPITAL ENCOUNTER (OUTPATIENT)
Dept: CT IMAGING | Facility: HOSPITAL | Age: 65
Discharge: HOME | End: 2025-06-17
Admitting: NURSE PRACTITIONER
Payer: COMMERCIAL

## 2025-06-17 DIAGNOSIS — R19.8 ABNORMAL FINDINGS ON ESOPHAGOGASTRODUODENOSCOPY (EGD): ICD-10-CM

## 2025-06-17 PROCEDURE — 25510000001 IOPAMIDOL PER 1 ML: Performed by: NURSE PRACTITIONER

## 2025-06-17 PROCEDURE — 71260 CT THORAX DX C+: CPT

## 2025-06-17 RX ORDER — IOPAMIDOL 755 MG/ML
100 INJECTION, SOLUTION INTRAVASCULAR
Status: COMPLETED | OUTPATIENT
Start: 2025-06-17 | End: 2025-06-17

## 2025-06-17 RX ADMIN — IOPAMIDOL 100 ML: 755 INJECTION, SOLUTION INTRAVENOUS at 14:14

## 2025-06-24 ENCOUNTER — RESULTS FOLLOW-UP (OUTPATIENT)
Dept: GASTROENTEROLOGY | Facility: HOSPITAL | Age: 65
End: 2025-06-24
Payer: COMMERCIAL

## 2025-06-25 ENCOUNTER — TELEPHONE (OUTPATIENT)
Dept: GASTROENTEROLOGY | Facility: CLINIC | Age: 65
End: 2025-06-25
Payer: COMMERCIAL

## 2025-06-25 NOTE — TELEPHONE ENCOUNTER
Hub staff attempted to follow warm transfer process and was unsuccessful     Caller: Reinier Moctezuma    Relationship to patient: Self    Best call back number: [phone] (home)       Patient is needing: PT RETURNED EMILE'S CALL, PT WILL NOT BE AVAILABLE UNTIL LATE AFTERNOON TODAY

## 2025-06-25 NOTE — TELEPHONE ENCOUNTER
Pt notified of results.   Pt wants to know if he needs to do something about the lymph nodes that are notated. Please advise.   [place] but we can leave a detailed msg.

## 2025-07-15 DIAGNOSIS — E55.9 VITAMIN D DEFICIENCY: ICD-10-CM

## 2025-07-16 RX ORDER — ERGOCALCIFEROL 1.25 MG/1
50000 CAPSULE, LIQUID FILLED ORAL WEEKLY
Qty: 12 CAPSULE | Refills: 1 | Status: SHIPPED | OUTPATIENT
Start: 2025-07-16

## 2025-07-29 ENCOUNTER — OFFICE VISIT (OUTPATIENT)
Dept: FAMILY MEDICINE CLINIC | Facility: CLINIC | Age: 65
End: 2025-07-29
Payer: COMMERCIAL

## 2025-07-29 ENCOUNTER — LAB (OUTPATIENT)
Dept: LAB | Facility: HOSPITAL | Age: 65
End: 2025-07-29
Payer: COMMERCIAL

## 2025-07-29 VITALS
TEMPERATURE: 97.6 F | BODY MASS INDEX: 27.49 KG/M2 | HEART RATE: 50 BPM | HEIGHT: 70 IN | OXYGEN SATURATION: 97 % | SYSTOLIC BLOOD PRESSURE: 126 MMHG | DIASTOLIC BLOOD PRESSURE: 66 MMHG | WEIGHT: 192 LBS

## 2025-07-29 DIAGNOSIS — E55.9 VITAMIN D DEFICIENCY: Primary | ICD-10-CM

## 2025-07-29 DIAGNOSIS — I10 ESSENTIAL HYPERTENSION: ICD-10-CM

## 2025-07-29 DIAGNOSIS — E78.5 HYPERLIPIDEMIA, UNSPECIFIED HYPERLIPIDEMIA TYPE: Chronic | ICD-10-CM

## 2025-07-29 DIAGNOSIS — E55.9 VITAMIN D DEFICIENCY: ICD-10-CM

## 2025-07-29 DIAGNOSIS — E11.9 NEW ONSET TYPE 2 DIABETES MELLITUS: ICD-10-CM

## 2025-07-29 DIAGNOSIS — K21.9 GASTROESOPHAGEAL REFLUX DISEASE WITHOUT ESOPHAGITIS: ICD-10-CM

## 2025-07-29 DIAGNOSIS — R73.09 ELEVATED HEMOGLOBIN A1C: ICD-10-CM

## 2025-07-29 DIAGNOSIS — E78.2 MIXED HYPERLIPIDEMIA: ICD-10-CM

## 2025-07-29 DIAGNOSIS — E03.9 HYPOTHYROIDISM, UNSPECIFIED TYPE: Chronic | ICD-10-CM

## 2025-07-29 PROBLEM — J30.9 ALLERGIC RHINITIS DUE TO ALLERGEN: Status: RESOLVED | Noted: 2019-03-01 | Resolved: 2025-07-29

## 2025-07-29 LAB
25(OH)D3 SERPL-MCNC: 49.8 NG/ML (ref 30–100)
ALBUMIN SERPL-MCNC: 4.3 G/DL (ref 3.5–5.2)
ALBUMIN UR-MCNC: <1.2 MG/DL
ALBUMIN/GLOB SERPL: 1.6 G/DL
ALP SERPL-CCNC: 72 U/L (ref 39–117)
ALT SERPL W P-5'-P-CCNC: 21 U/L (ref 1–41)
ANION GAP SERPL CALCULATED.3IONS-SCNC: 13 MMOL/L (ref 5–15)
AST SERPL-CCNC: 20 U/L (ref 1–40)
BASOPHILS # BLD AUTO: 0.07 10*3/MM3 (ref 0–0.2)
BASOPHILS NFR BLD AUTO: 1.5 % (ref 0–1.5)
BILIRUB SERPL-MCNC: 0.3 MG/DL (ref 0–1.2)
BUN SERPL-MCNC: 9 MG/DL (ref 8–23)
BUN/CREAT SERPL: 8.9 (ref 7–25)
CALCIUM SPEC-SCNC: 9.2 MG/DL (ref 8.6–10.5)
CHLORIDE SERPL-SCNC: 103 MMOL/L (ref 98–107)
CHOLEST SERPL-MCNC: 170 MG/DL (ref 0–200)
CO2 SERPL-SCNC: 24 MMOL/L (ref 22–29)
CREAT SERPL-MCNC: 1.01 MG/DL (ref 0.76–1.27)
CREAT UR-MCNC: 107.8 MG/DL
DEPRECATED RDW RBC AUTO: 44.1 FL (ref 37–54)
EGFRCR SERPLBLD CKD-EPI 2021: 81.5 ML/MIN/1.73
EOSINOPHIL # BLD AUTO: 0.41 10*3/MM3 (ref 0–0.4)
EOSINOPHIL NFR BLD AUTO: 9 % (ref 0.3–6.2)
ERYTHROCYTE [DISTWIDTH] IN BLOOD BY AUTOMATED COUNT: 13.1 % (ref 12.3–15.4)
GLOBULIN UR ELPH-MCNC: 2.7 GM/DL
GLUCOSE SERPL-MCNC: 115 MG/DL (ref 65–99)
HBA1C MFR BLD: 6 % (ref 4.8–5.6)
HCT VFR BLD AUTO: 52.3 % (ref 37.5–51)
HDLC SERPL-MCNC: 35 MG/DL (ref 40–60)
HGB BLD-MCNC: 17.5 G/DL (ref 13–17.7)
IMM GRANULOCYTES # BLD AUTO: 0.02 10*3/MM3 (ref 0–0.05)
IMM GRANULOCYTES NFR BLD AUTO: 0.4 % (ref 0–0.5)
LDLC SERPL CALC-MCNC: 79 MG/DL (ref 0–100)
LDLC/HDLC SERPL: 1.89 {RATIO}
LYMPHOCYTES # BLD AUTO: 1.11 10*3/MM3 (ref 0.7–3.1)
LYMPHOCYTES NFR BLD AUTO: 24.3 % (ref 19.6–45.3)
MCH RBC QN AUTO: 30.9 PG (ref 26.6–33)
MCHC RBC AUTO-ENTMCNC: 33.5 G/DL (ref 31.5–35.7)
MCV RBC AUTO: 92.4 FL (ref 79–97)
MICROALBUMIN/CREAT UR: NORMAL MG/G{CREAT}
MONOCYTES # BLD AUTO: 0.5 10*3/MM3 (ref 0.1–0.9)
MONOCYTES NFR BLD AUTO: 10.9 % (ref 5–12)
NEUTROPHILS NFR BLD AUTO: 2.46 10*3/MM3 (ref 1.7–7)
NEUTROPHILS NFR BLD AUTO: 53.9 % (ref 42.7–76)
NRBC BLD AUTO-RTO: 0 /100 WBC (ref 0–0.2)
PLATELET # BLD AUTO: 213 10*3/MM3 (ref 140–450)
PMV BLD AUTO: 11.5 FL (ref 6–12)
POTASSIUM SERPL-SCNC: 4.3 MMOL/L (ref 3.5–5.2)
PROT SERPL-MCNC: 7 G/DL (ref 6–8.5)
RBC # BLD AUTO: 5.66 10*6/MM3 (ref 4.14–5.8)
SODIUM SERPL-SCNC: 140 MMOL/L (ref 136–145)
T4 FREE SERPL-MCNC: 1.2 NG/DL (ref 0.92–1.68)
TRIGL SERPL-MCNC: 345 MG/DL (ref 0–150)
TSH SERPL DL<=0.05 MIU/L-ACNC: 1.66 UIU/ML (ref 0.27–4.2)
VLDLC SERPL-MCNC: 56 MG/DL (ref 5–40)
WBC NRBC COR # BLD AUTO: 4.57 10*3/MM3 (ref 3.4–10.8)

## 2025-07-29 PROCEDURE — 82043 UR ALBUMIN QUANTITATIVE: CPT

## 2025-07-29 PROCEDURE — 99214 OFFICE O/P EST MOD 30 MIN: CPT | Performed by: NURSE PRACTITIONER

## 2025-07-29 PROCEDURE — 80050 GENERAL HEALTH PANEL: CPT

## 2025-07-29 PROCEDURE — 82570 ASSAY OF URINE CREATININE: CPT

## 2025-07-29 PROCEDURE — 36415 COLL VENOUS BLD VENIPUNCTURE: CPT

## 2025-07-29 PROCEDURE — 82306 VITAMIN D 25 HYDROXY: CPT

## 2025-07-29 PROCEDURE — 83036 HEMOGLOBIN GLYCOSYLATED A1C: CPT

## 2025-07-29 PROCEDURE — 80061 LIPID PANEL: CPT

## 2025-07-29 PROCEDURE — 84439 ASSAY OF FREE THYROXINE: CPT

## 2025-07-29 RX ORDER — ASPIRIN 81 MG/1
81 TABLET ORAL DAILY
COMMUNITY

## 2025-07-29 RX ORDER — LEVOTHYROXINE SODIUM 50 UG/1
50 TABLET ORAL DAILY
Qty: 90 TABLET | Refills: 1 | Status: SHIPPED | OUTPATIENT
Start: 2025-07-29

## 2025-07-29 RX ORDER — ROSUVASTATIN CALCIUM 40 MG/1
40 TABLET, COATED ORAL
Qty: 90 TABLET | Refills: 1 | Status: SHIPPED | OUTPATIENT
Start: 2025-07-29

## 2025-07-29 NOTE — PROGRESS NOTES
Chief Complaint  Follow-up (6 months), Diabetes, Hypertension, Hyperlipidemia, Hypothyroidism, Heartburn, Vitamin D Deficiency, Allergies, and Erectile Dysfunction    SUBJECTIVE  Reinier Moctezuma presents to [facility]    Hypertension:  Patient is taking Atenolol, Amlodipine, ASA.  Patient's Blood Pressure in clinic today is 126/66.  Patient does not monitor blood pressure at home.  Patient denies chest pain, shortness of air, headache, flushing, abnormal swelling in feet/ankles.  Patient's cardiologist is Dr. Ibarra.     Hyperlipidemia:  Patient is taking Zetia, Rosuvastatin.  Patient denies nocturnal leg cramps, myalgias.  Patient attempts to maintain a diet low in fat and carbohydrates.     Hypothyroidism:  Patient takes Levothyroxine.  Patient states he takes medication first thing in the morning on an empty stomach with just a sip of water.  Patient denies weight gain/loss, fatigue, jitters, hair shedding, neck fullness, difficulty swallowing.     Gastroesophageal Reflux:  Patient is taking Lansoprazole, with good control of symptoms.  Patient does not need over the counter medications for breakthrough symptoms.  Patient tries to avoid trigger foods, eat frequent small meals, not lie down within 2 hours of eating, avoids NSAIDS medications and alcohol.     Vitamin D Deficiency:  Patient is taking Vitamin D2 weekly and doing well.     Seasonal Allergies:  Patient is taking Zyrtec, with good control of symptoms.     ED:  Patient is taking Sildenafil PRN with good results.    Chronic Hip Pain:  Patient was taking Naproxen but recommended to d/c per GI after EGD.  Patient was instructed to discuss alternate medications.    History of Present Illness  Past Medical History:   Diagnosis Date    Allergic rhinitis due to allergen 03/01/2019    Arthritis     Arthritis 08/24/2021    Colon polyps     COPD (chronic obstructive pulmonary disease)     Coronary artery calcification 01/03/2022     Erectile dysfunction 10/16/2015    Esophageal reflux disease     Essential hypertension 08/04/2017    GERD (gastroesophageal reflux disease) 03/01/2019    HLD (hyperlipidemia)     Hypothyroidism 03/17/2016    Limb swelling     Nicotine dependence 08/04/2017    JEAN-PAUL (obstructive sleep apnea) 11/11/2021    Peyronie's disease     Polycythemia secondary to smoking     Prostatitis     Reactive airway disease 11/16/2022    Seasonal allergies     Sinus trouble     SOB (shortness of breath)     Tobacco use disorder 10/16/2015    Vitamin D deficiency 05/25/2021    Wrist sprain 07/08/2015      Family History   Problem Relation Age of Onset    Aneurysm Mother     Heart disease Father     Cancer Father     Nephrolithiasis Father     Lung cancer Father     Cancer Brother     Colon cancer Brother 65    Liver cancer Brother 65    Diabetes Maternal Grandmother     Diabetes Other     Esophageal cancer Neg Hx       Past Surgical History:   Procedure Laterality Date    COLONOSCOPY  2008    COLONOSCOPY N/A 4/22/2024    Procedure: COLONOSCOPY WITH COLD SNARE POLYPECTOMIES;  Surgeon: Silvana Chavez MD;  Location: [facility] ENDOSCOPY;  Service: Gastroenterology;  Laterality: N/A;  COLON POLYPS  HEMORRHOIDS    CYST REMOVAL      CYSTOSCOPY      ENDOSCOPY N/A 6/6/2025    Procedure: ESOPHAGOGASTRODUODENOSCOPY with biopsies, DILATION TO 20MM;  Surgeon: Silvana Chavez MD;  Location: [facility] ENDOSCOPY;  Service: Gastroenterology;  Laterality: N/A;  IRREGULAR Z-LINE, GASTRITIS    HEEL SPUR SURGERY      NOSE SURGERY  2011    DR ZAMBRANO    SHOULDER SURGERY      VASECTOMY          Current Outpatient Medications:     amLODIPine (NORVASC) 10 MG tablet, Take 1 tablet by mouth Daily., Disp: 90 tablet, Rfl: 1    aspirin 81 MG EC tablet, Take 1 tablet by mouth Daily., Disp: , Rfl:     atenolol (TENORMIN) 100 MG tablet, Take 1 tablet by mouth Daily., Disp: 90 tablet, Rfl: 1    cetirizine (zyrTEC) 10 MG tablet, Take 1 tablet by mouth Daily.,  "Disp: 90 tablet, Rfl: 1    ezetimibe (ZETIA) 10 MG tablet, Take 1 tablet by mouth Daily., Disp: 90 tablet, Rfl: 1    lansoprazole (PREVACID) 15 MG capsule, Take 1 capsule by mouth Daily., Disp: 90 capsule, Rfl: 1    levothyroxine (SYNTHROID, LEVOTHROID) 50 MCG tablet, Take 1 tablet by mouth Daily., Disp: 90 tablet, Rfl: 1    rosuvastatin (CRESTOR) 40 MG tablet, Take 1 tablet by mouth every night at bedtime., Disp: 90 tablet, Rfl: 1    vitamin D (ERGOCALCIFEROL) 1.25 MG (33881 UT) capsule capsule, Take 1 capsule by mouth 1 (One) Time Per Week., Disp: 12 capsule, Rfl: 1    sildenafil (Viagra) 100 MG tablet, Take 1 tablet by mouth Daily As Needed for Erectile Dysfunction., Disp: 30 tablet, Rfl: 2    OBJECTIVE  Vital Signs:   /66 (BP Location: Left arm, Patient Position: Sitting, Cuff Size: Large Adult)   Pulse 50   Temp 97.6 °F (36.4 °C) (Temporal)   Ht 177.8 cm (70\")   Wt 87.1 kg (192 lb)   SpO2 97%   BMI 27.55 kg/m²    Estimated body mass index is 27.55 kg/m² as calculated from the following:    Height as of this encounter: 177.8 cm (70\").    Weight as of this encounter: 87.1 kg (192 lb).     Wt Readings from Last 3 Encounters:   07/29/25 87.1 kg (192 lb)   06/06/25 87 kg (191 lb 12.8 oz)   04/15/25 93 kg (205 lb)     BP Readings from Last 3 Encounters:   07/29/25 126/66   06/06/25 150/96   04/15/25 122/80       Physical Exam  Vitals reviewed.   Constitutional:       Appearance: Normal appearance. He is well-developed.   HENT:      Head: Normocephalic and atraumatic.      Right Ear: External ear normal.      Left Ear: External ear normal.      Mouth/Throat:      Pharynx: No oropharyngeal exudate.   Eyes:      Conjunctiva/sclera: Conjunctivae normal.      Pupils: Pupils are equal, round, and reactive to light.   Neck:      Vascular: No carotid bruit.   Cardiovascular:      Rate and Rhythm: Normal rate and regular rhythm.      Pulses: Normal pulses.      Heart sounds: Normal heart sounds. No murmur heard.   "   No friction rub. No gallop.   Pulmonary:      Effort: Pulmonary effort is normal.      Breath sounds: Normal breath sounds. No wheezing or rhonchi.   Skin:     General: Skin is warm and dry.   Neurological:      Mental Status: He is alert and oriented to person, place, and time.      Cranial Nerves: No cranial nerve deficit.   Psychiatric:         Mood and Affect: Mood and affect normal.         Behavior: Behavior normal.         Thought Content: Thought content normal.         Judgment: Judgment normal.          Result Review        CT Chest With Contrast Diagnostic  Result Date: 6/23/2025  Impression: 1. Stable examination with mild bilateral hilar lymph nodes likely reactive. No acute cardiopulmonary process or suspicious pulmonary nodule or mass. Electronically Signed: Martha Cummins MD  6/23/2025 4:40 PM EDT  Workstation ID: [id number]        The above data has been reviewed by BRAN Pruitt 07/29/2025 08:48 EDT.          Patient Care Team:  Margie Belcher APRN as PCP - General (Family Medicine)  Reva Pedro MD as Consulting Physician (Hematology and Oncology)  Tej Mcnulty DO as Consulting Physician (Pulmonary Disease)  Yeison Ibarra MD as Consulting Physician (Cardiology)  Silvana Chavez MD as Consulting Physician (Gastroenterology)           ASSESSMENT & PLAN    Diagnoses and all orders for this visit:    1. Vitamin D deficiency (Primary)  Comments:  Continue with daily supplement and check lab  Orders:  -     Vitamin D,25-Hydroxy; Future    2. Hypothyroidism, unspecified type  Comments:  Stable on synthroid 50mcg daily  Orders:  -     TSH; Future  -     T4, free; Future  -     levothyroxine (SYNTHROID, LEVOTHROID) 50 MCG tablet; Take 1 tablet by mouth Daily.  Dispense: 90 tablet; Refill: 1    3. Mixed hyperlipidemia  Comments:  stable on crestor 40mg nightly  Orders:  -     Lipid Panel; Future  -     rosuvastatin (CRESTOR) 40 MG tablet; Take 1 tablet by mouth  every night at bedtime.  Dispense: 90 tablet; Refill: 1    4. Essential hypertension  Comments:  BP well-controlled, continue current medication  Orders:  -     Comprehensive Metabolic Panel; Future  -     CBC Auto Differential; Future    5. Elevated hemoglobin A1c  -     Hemoglobin A1c; Future  -     Microalbumin / Creatinine Urine Ratio - Urine, Clean Catch; Future    6. Gastroesophageal reflux disease without esophagitis  Comments:  Symptoms well controlled with current medication regimen, cont. Current meds.         Tobacco Use: Medium Risk (7/29/2025)    Patient History     Smoking Tobacco Use: Former     Smokeless Tobacco Use: Never     Passive Exposure: Past       Follow Up     Return in about 6 months (around 1/29/2026).      Patient was given instructions and counseling regarding his condition or for health maintenance advice. Please see specific information pulled into the AVS if appropriate.   I have reviewed information obtained and documented by others and I have confirmed the accuracy of this documented note.    Margie Belcher, APRN

## (undated) DEVICE — CONN JET HYDRA H20 AUXILIARY DISP

## (undated) DEVICE — SOLIDIFIER LIQLOC PLS 1500CC BT

## (undated) DEVICE — Device

## (undated) DEVICE — LINER SURG CANSTR SXN S/RIGD 1500CC

## (undated) DEVICE — SOL IRRG H2O PL/BG 1000ML STRL

## (undated) DEVICE — THE STERILE LIGHT HANDLE COVER IS USED WITH STERIS SURGICAL LIGHTING AND VISUALIZATION SYSTEMS.

## (undated) DEVICE — Device: Brand: DEFENDO AIR/WATER/SUCTION AND BIOPSY VALVE

## (undated) DEVICE — DEV INFL CRE STERIFLATE 60CC DISP

## (undated) DEVICE — ESOPHAGEAL BALLOON DILATATION CATHETER: Brand: CRE FIXED WIRE

## (undated) DEVICE — BLCK/BITE BLOX WO/DENTL/RIM W/STRAP 54F

## (undated) DEVICE — THE SINGLE USE ETRAP – POLYP TRAP IS USED FOR SUCTION RETRIEVAL OF ENDOSCOPICALLY REMOVED POLYPS.: Brand: ETRAP

## (undated) DEVICE — SNAR POLYP CAPTIFLEX XS/OVL 11X2.4MM 240CM 1P/U

## (undated) DEVICE — DEFENDO AIR WATER SUCTION AND BIOPSY VALVE KIT FOR  OLYMPUS: Brand: DEFENDO AIR/WATER/SUCTION AND BIOPSY VALVE